# Patient Record
Sex: MALE | Race: WHITE | Employment: OTHER | URBAN - NONMETROPOLITAN AREA
[De-identification: names, ages, dates, MRNs, and addresses within clinical notes are randomized per-mention and may not be internally consistent; named-entity substitution may affect disease eponyms.]

---

## 2019-04-12 LAB
CHOLESTEROL, TOTAL: 171 MG/DL
CHOLESTEROL/HDL RATIO: 4
CREATININE: 1.1 MG/DL
HDLC SERPL-MCNC: 43 MG/DL (ref 35–70)
LDL CHOLESTEROL CALCULATED: 110 MG/DL (ref 0–160)
POTASSIUM (K+): 4.4
TRIGL SERPL-MCNC: 90 MG/DL
VLDLC SERPL CALC-MCNC: NORMAL MG/DL

## 2019-07-03 ENCOUNTER — OFFICE VISIT (OUTPATIENT)
Dept: FAMILY MEDICINE CLINIC | Age: 69
End: 2019-07-03
Payer: MEDICARE

## 2019-07-03 VITALS
DIASTOLIC BLOOD PRESSURE: 92 MMHG | SYSTOLIC BLOOD PRESSURE: 146 MMHG | TEMPERATURE: 98.5 F | HEART RATE: 68 BPM | WEIGHT: 216 LBS
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
## 2019-07-03 PROCEDURE — 99213 OFFICE O/P EST LOW 20 MIN: CPT | Performed by: FAMILY MEDICINE

## 2019-07-03 RX ORDER — DOXYCYCLINE HYCLATE 100 MG
100 TABLET ORAL 2 TIMES DAILY
Qty: 20 TABLET | Refills: 0 | Status: SHIPPED | OUTPATIENT
Start: 2019-07-03 | End: 2019-07-13

## 2019-07-03 RX ORDER — OMEPRAZOLE 20 MG/1
CAPSULE, DELAYED RELEASE ORAL
COMMUNITY
Start: 2019-06-20 | End: 2019-10-11 | Stop reason: SDUPTHER

## 2019-07-03 RX ORDER — LISINOPRIL AND HYDROCHLOROTHIAZIDE 20; 12.5 MG/1; MG/1
TABLET ORAL
Refills: 0 | COMMUNITY
Start: 2019-04-12 | End: 2019-10-01 | Stop reason: SDUPTHER

## 2019-10-01 RX ORDER — LISINOPRIL AND HYDROCHLOROTHIAZIDE 20; 12.5 MG/1; MG/1
TABLET ORAL
Qty: 30 TABLET | Refills: 0 | Status: SHIPPED | OUTPATIENT
Start: 2019-10-01 | End: 2019-10-11 | Stop reason: SDUPTHER

## 2019-10-04 RX ORDER — ASPIRIN 81 MG/1
81 TABLET ORAL DAILY
COMMUNITY

## 2019-10-04 RX ORDER — M-VIT,TX,IRON,MINS/CALC/FOLIC 27MG-0.4MG
1 TABLET ORAL DAILY
COMMUNITY

## 2019-10-04 RX ORDER — MULTIVITAMIN WITH IRON
TABLET ORAL
COMMUNITY
End: 2019-11-15 | Stop reason: ALTCHOICE

## 2019-10-11 ENCOUNTER — HOSPITAL ENCOUNTER (OUTPATIENT)
Age: 69
Discharge: HOME | End: 2019-10-13
Payer: MEDICARE

## 2019-10-11 ENCOUNTER — OFFICE VISIT (OUTPATIENT)
Dept: FAMILY MEDICINE CLINIC | Age: 69
End: 2019-10-11
Payer: MEDICARE

## 2019-10-11 VITALS
TEMPERATURE: 98.1 F | BODY MASS INDEX: 29.96 KG/M2 | OXYGEN SATURATION: 98 % | DIASTOLIC BLOOD PRESSURE: 100 MMHG | HEIGHT: 71 IN | WEIGHT: 214 LBS | SYSTOLIC BLOOD PRESSURE: 152 MMHG | HEART RATE: 76 BPM

## 2019-10-11 DIAGNOSIS — E55.9 VITAMIN D DEFICIENCY: ICD-10-CM

## 2019-10-11 DIAGNOSIS — M25.552 LEFT HIP PAIN: ICD-10-CM

## 2019-10-11 DIAGNOSIS — E78.5 HYPERLIPIDEMIA, UNSPECIFIED HYPERLIPIDEMIA TYPE: ICD-10-CM

## 2019-10-11 DIAGNOSIS — R73.01 IMPAIRED FASTING GLUCOSE: ICD-10-CM

## 2019-10-11 DIAGNOSIS — I10 ESSENTIAL HYPERTENSION: ICD-10-CM

## 2019-10-11 DIAGNOSIS — E03.9 HYPOTHYROIDISM, UNSPECIFIED TYPE: ICD-10-CM

## 2019-10-11 DIAGNOSIS — M54.50 LUMBAR PAIN: Primary | ICD-10-CM

## 2019-10-11 LAB
ALBUMIN SERPL-MCNC: 4.6 G/DL (ref 3.5–5.2)
ALP BLD-CCNC: 60 U/L (ref 40–129)
ALT SERPL-CCNC: 39 U/L (ref 0–40)
ANION GAP SERPL CALCULATED.3IONS-SCNC: 14 MMOL/L (ref 7–16)
AST SERPL-CCNC: 33 U/L (ref 0–39)
BASOPHILS ABSOLUTE: 0.03 E9/L (ref 0–0.2)
BASOPHILS RELATIVE PERCENT: 0.6 % (ref 0–2)
BILIRUB SERPL-MCNC: 1.1 MG/DL (ref 0–1.2)
BUN BLDV-MCNC: 15 MG/DL (ref 8–23)
CALCIUM SERPL-MCNC: 9.9 MG/DL (ref 8.6–10.2)
CHLORIDE BLD-SCNC: 103 MMOL/L (ref 98–107)
CHOLESTEROL, TOTAL: 174 MG/DL (ref 0–199)
CO2: 24 MMOL/L (ref 22–29)
CREAT SERPL-MCNC: 1.1 MG/DL (ref 0.7–1.2)
CREATININE URINE: 78 MG/DL (ref 40–278)
EOSINOPHILS ABSOLUTE: 0.13 E9/L (ref 0.05–0.5)
EOSINOPHILS RELATIVE PERCENT: 2.4 % (ref 0–6)
GFR AFRICAN AMERICAN: >60
GFR NON-AFRICAN AMERICAN: >60 ML/MIN/1.73
GLUCOSE BLD-MCNC: 112 MG/DL (ref 74–99)
HBA1C MFR BLD: 5.3 % (ref 4–5.6)
HCT VFR BLD CALC: 49.5 % (ref 37–54)
HDLC SERPL-MCNC: 39 MG/DL
HEMOGLOBIN: 16.4 G/DL (ref 12.5–16.5)
IMMATURE GRANULOCYTES #: 0.02 E9/L
IMMATURE GRANULOCYTES %: 0.4 % (ref 0–5)
LDL CHOLESTEROL CALCULATED: 100 MG/DL (ref 0–99)
LYMPHOCYTES ABSOLUTE: 0.79 E9/L (ref 1.5–4)
LYMPHOCYTES RELATIVE PERCENT: 14.8 % (ref 20–42)
MCH RBC QN AUTO: 32.4 PG (ref 26–35)
MCHC RBC AUTO-ENTMCNC: 33.1 % (ref 32–34.5)
MCV RBC AUTO: 97.8 FL (ref 80–99.9)
MICROALBUMIN UR-MCNC: <12 MG/L
MICROALBUMIN/CREAT UR-RTO: ABNORMAL (ref 0–30)
MONOCYTES ABSOLUTE: 0.46 E9/L (ref 0.1–0.95)
MONOCYTES RELATIVE PERCENT: 8.6 % (ref 2–12)
NEUTROPHILS ABSOLUTE: 3.89 E9/L (ref 1.8–7.3)
NEUTROPHILS RELATIVE PERCENT: 73.2 % (ref 43–80)
PDW BLD-RTO: 12.6 FL (ref 11.5–15)
PLATELET # BLD: 165 E9/L (ref 130–450)
PMV BLD AUTO: 10.6 FL (ref 7–12)
POTASSIUM SERPL-SCNC: 4.2 MMOL/L (ref 3.5–5)
RBC # BLD: 5.06 E12/L (ref 3.8–5.8)
SODIUM BLD-SCNC: 141 MMOL/L (ref 132–146)
T4 FREE: 1.29 NG/DL (ref 0.93–1.7)
TOTAL CK: 121 U/L (ref 20–200)
TOTAL PROTEIN: 7.6 G/DL (ref 6.4–8.3)
TRIGL SERPL-MCNC: 174 MG/DL (ref 0–149)
TSH SERPL DL<=0.05 MIU/L-ACNC: 0.47 UIU/ML (ref 0.27–4.2)
URIC ACID, SERUM: 8 MG/DL (ref 3.4–7)
VITAMIN D 25-HYDROXY: 40 NG/ML (ref 30–100)
VLDLC SERPL CALC-MCNC: 35 MG/DL
WBC # BLD: 5.3 E9/L (ref 4.5–11.5)

## 2019-10-11 PROCEDURE — 80053 COMPREHEN METABOLIC PANEL: CPT

## 2019-10-11 PROCEDURE — 85025 COMPLETE CBC W/AUTO DIFF WBC: CPT

## 2019-10-11 PROCEDURE — 82306 VITAMIN D 25 HYDROXY: CPT

## 2019-10-11 PROCEDURE — 83036 HEMOGLOBIN GLYCOSYLATED A1C: CPT

## 2019-10-11 PROCEDURE — 82044 UR ALBUMIN SEMIQUANTITATIVE: CPT

## 2019-10-11 PROCEDURE — 80061 LIPID PANEL: CPT

## 2019-10-11 PROCEDURE — 36415 COLL VENOUS BLD VENIPUNCTURE: CPT

## 2019-10-11 PROCEDURE — G0008 ADMIN INFLUENZA VIRUS VAC: HCPCS | Performed by: FAMILY MEDICINE

## 2019-10-11 PROCEDURE — 84439 ASSAY OF FREE THYROXINE: CPT

## 2019-10-11 PROCEDURE — 82570 ASSAY OF URINE CREATININE: CPT

## 2019-10-11 PROCEDURE — 84550 ASSAY OF BLOOD/URIC ACID: CPT

## 2019-10-11 PROCEDURE — 84443 ASSAY THYROID STIM HORMONE: CPT

## 2019-10-11 PROCEDURE — 82550 ASSAY OF CK (CPK): CPT

## 2019-10-11 PROCEDURE — 90653 IIV ADJUVANT VACCINE IM: CPT | Performed by: FAMILY MEDICINE

## 2019-10-11 PROCEDURE — 99215 OFFICE O/P EST HI 40 MIN: CPT | Performed by: FAMILY MEDICINE

## 2019-10-11 RX ORDER — LISINOPRIL AND HYDROCHLOROTHIAZIDE 20; 12.5 MG/1; MG/1
TABLET ORAL
Qty: 30 TABLET | Refills: 5 | Status: SHIPPED | OUTPATIENT
Start: 2019-10-11 | End: 2019-11-04 | Stop reason: SDUPTHER

## 2019-10-11 RX ORDER — CITALOPRAM 10 MG/1
10 TABLET ORAL DAILY
Qty: 30 TABLET | Refills: 5 | Status: SHIPPED | OUTPATIENT
Start: 2019-10-11 | End: 2019-10-25 | Stop reason: SINTOL

## 2019-10-11 RX ORDER — OMEPRAZOLE 20 MG/1
20 CAPSULE, DELAYED RELEASE ORAL DAILY
Qty: 30 CAPSULE | Refills: 5 | Status: SHIPPED
Start: 2019-10-11 | End: 2020-05-22 | Stop reason: SDUPTHER

## 2019-10-11 RX ORDER — AMLODIPINE BESYLATE 5 MG/1
5 TABLET ORAL DAILY
Qty: 30 TABLET | Refills: 3 | Status: SHIPPED
Start: 2019-10-11 | End: 2020-02-12 | Stop reason: SDUPTHER

## 2019-10-11 ASSESSMENT — PATIENT HEALTH QUESTIONNAIRE - PHQ9
1. LITTLE INTEREST OR PLEASURE IN DOING THINGS: 0
SUM OF ALL RESPONSES TO PHQ9 QUESTIONS 1 & 2: 0
SUM OF ALL RESPONSES TO PHQ QUESTIONS 1-9: 0
SUM OF ALL RESPONSES TO PHQ QUESTIONS 1-9: 0
2. FEELING DOWN, DEPRESSED OR HOPELESS: 0

## 2019-10-11 ASSESSMENT — ENCOUNTER SYMPTOMS
ALLERGIC/IMMUNOLOGIC NEGATIVE: 1
COUGH: 0
ABDOMINAL PAIN: 0
WHEEZING: 0
SORE THROAT: 0
CHEST TIGHTNESS: 0
EYE PAIN: 0
TROUBLE SWALLOWING: 0
CONSTIPATION: 0
EYE DISCHARGE: 0
VOMITING: 0
SINUS PAIN: 0
SHORTNESS OF BREATH: 0
BACK PAIN: 1
COLOR CHANGE: 0
DIARRHEA: 0
NAUSEA: 0

## 2019-10-25 ENCOUNTER — OFFICE VISIT (OUTPATIENT)
Dept: FAMILY MEDICINE CLINIC | Age: 69
End: 2019-10-25
Payer: MEDICARE

## 2019-10-25 VITALS
OXYGEN SATURATION: 98 % | SYSTOLIC BLOOD PRESSURE: 148 MMHG | HEIGHT: 71 IN | WEIGHT: 214 LBS | DIASTOLIC BLOOD PRESSURE: 94 MMHG | BODY MASS INDEX: 29.96 KG/M2 | TEMPERATURE: 97.7 F | HEART RATE: 82 BPM

## 2019-10-25 DIAGNOSIS — I10 ESSENTIAL HYPERTENSION: Primary | ICD-10-CM

## 2019-10-25 DIAGNOSIS — N52.8 OTHER MALE ERECTILE DYSFUNCTION: ICD-10-CM

## 2019-10-25 DIAGNOSIS — F34.1 DYSTHYMIA: ICD-10-CM

## 2019-10-25 PROCEDURE — 99213 OFFICE O/P EST LOW 20 MIN: CPT | Performed by: FAMILY MEDICINE

## 2019-10-25 RX ORDER — FLUOXETINE 10 MG/1
10 CAPSULE ORAL DAILY
Qty: 30 CAPSULE | Refills: 3 | Status: SHIPPED
Start: 2019-10-25 | End: 2020-05-22

## 2019-10-25 ASSESSMENT — ENCOUNTER SYMPTOMS
ABDOMINAL PAIN: 0
NAUSEA: 0
EYE PAIN: 0
EYE DISCHARGE: 0
ALLERGIC/IMMUNOLOGIC NEGATIVE: 1
COUGH: 0
VOMITING: 0
SINUS PAIN: 0
DIARRHEA: 0
SHORTNESS OF BREATH: 0
WHEEZING: 0
BACK PAIN: 1
SORE THROAT: 0
COLOR CHANGE: 0
CHEST TIGHTNESS: 0
TROUBLE SWALLOWING: 0
CONSTIPATION: 0

## 2019-11-04 RX ORDER — LISINOPRIL AND HYDROCHLOROTHIAZIDE 20; 12.5 MG/1; MG/1
TABLET ORAL
Qty: 30 TABLET | Refills: 0 | Status: SHIPPED | OUTPATIENT
Start: 2019-11-04 | End: 2019-12-03 | Stop reason: SDUPTHER

## 2019-11-15 ENCOUNTER — OFFICE VISIT (OUTPATIENT)
Dept: FAMILY MEDICINE CLINIC | Age: 69
End: 2019-11-15
Payer: MEDICARE

## 2019-11-15 VITALS
OXYGEN SATURATION: 97 % | DIASTOLIC BLOOD PRESSURE: 82 MMHG | BODY MASS INDEX: 29.85 KG/M2 | HEART RATE: 84 BPM | TEMPERATURE: 97.8 F | WEIGHT: 214 LBS | SYSTOLIC BLOOD PRESSURE: 138 MMHG

## 2019-11-15 DIAGNOSIS — F34.1 DYSTHYMIA: ICD-10-CM

## 2019-11-15 DIAGNOSIS — I10 ESSENTIAL HYPERTENSION: Primary | ICD-10-CM

## 2019-11-15 PROCEDURE — 99213 OFFICE O/P EST LOW 20 MIN: CPT | Performed by: FAMILY MEDICINE

## 2019-11-15 ASSESSMENT — ENCOUNTER SYMPTOMS
EYE PAIN: 0
TROUBLE SWALLOWING: 0
COLOR CHANGE: 0
CONSTIPATION: 0
WHEEZING: 0
SINUS PAIN: 0
COUGH: 0
SHORTNESS OF BREATH: 0
ABDOMINAL PAIN: 0
NAUSEA: 0
SORE THROAT: 0
CHEST TIGHTNESS: 0
DIARRHEA: 0
BACK PAIN: 1
EYE DISCHARGE: 0
ALLERGIC/IMMUNOLOGIC NEGATIVE: 1
VOMITING: 0

## 2019-12-03 RX ORDER — LISINOPRIL AND HYDROCHLOROTHIAZIDE 20; 12.5 MG/1; MG/1
TABLET ORAL
Qty: 90 TABLET | Refills: 2 | Status: SHIPPED
Start: 2019-12-03 | End: 2020-05-22 | Stop reason: SDUPTHER

## 2020-02-12 RX ORDER — AMLODIPINE BESYLATE 5 MG/1
5 TABLET ORAL DAILY
Qty: 30 TABLET | Refills: 3 | Status: SHIPPED
Start: 2020-02-12 | End: 2020-05-22 | Stop reason: SDUPTHER

## 2020-02-12 NOTE — TELEPHONE ENCOUNTER
Last Appointment:  11/15/2019  No future appointments. Patient requesting refill on pended medication stated he only has 2 pills left.

## 2020-05-22 ENCOUNTER — VIRTUAL VISIT (OUTPATIENT)
Dept: PRIMARY CARE CLINIC | Age: 70
End: 2020-05-22
Payer: MEDICARE

## 2020-05-22 PROCEDURE — 81003 URINALYSIS AUTO W/O SCOPE: CPT | Performed by: FAMILY MEDICINE

## 2020-05-22 PROCEDURE — 99214 OFFICE O/P EST MOD 30 MIN: CPT | Performed by: FAMILY MEDICINE

## 2020-05-22 RX ORDER — LISINOPRIL AND HYDROCHLOROTHIAZIDE 20; 12.5 MG/1; MG/1
TABLET ORAL
Qty: 90 TABLET | Refills: 3 | Status: SHIPPED
Start: 2020-05-22 | End: 2021-05-17 | Stop reason: SDUPTHER

## 2020-05-22 RX ORDER — AMLODIPINE BESYLATE 5 MG/1
5 TABLET ORAL DAILY
Qty: 90 TABLET | Refills: 3 | Status: SHIPPED
Start: 2020-05-22 | End: 2020-12-07

## 2020-05-22 RX ORDER — OMEPRAZOLE 20 MG/1
20 CAPSULE, DELAYED RELEASE ORAL DAILY
Qty: 90 CAPSULE | Refills: 3 | Status: SHIPPED
Start: 2020-05-22 | End: 2020-12-07

## 2020-05-22 ASSESSMENT — PATIENT HEALTH QUESTIONNAIRE - PHQ9
2. FEELING DOWN, DEPRESSED OR HOPELESS: 0
SUM OF ALL RESPONSES TO PHQ9 QUESTIONS 1 & 2: 0
1. LITTLE INTEREST OR PLEASURE IN DOING THINGS: 0
SUM OF ALL RESPONSES TO PHQ QUESTIONS 1-9: 0
SUM OF ALL RESPONSES TO PHQ QUESTIONS 1-9: 0

## 2020-05-22 ASSESSMENT — ENCOUNTER SYMPTOMS
CONSTIPATION: 0
DIARRHEA: 0
BACK PAIN: 0
ABDOMINAL PAIN: 0
NAUSEA: 0
VOMITING: 0
SHORTNESS OF BREATH: 0
COUGH: 0
WHEEZING: 0

## 2020-05-22 NOTE — PROGRESS NOTES
level: Not on file   Occupational History    Not on file   Social Needs    Financial resource strain: Not on file    Food insecurity     Worry: Not on file     Inability: Not on file    Transportation needs     Medical: Not on file     Non-medical: Not on file   Tobacco Use    Smoking status: Never Smoker    Smokeless tobacco: Never Used   Substance and Sexual Activity    Alcohol use: Not on file    Drug use: Not on file    Sexual activity: Not on file   Lifestyle    Physical activity     Days per week: Not on file     Minutes per session: Not on file    Stress: Not on file   Relationships    Social connections     Talks on phone: Not on file     Gets together: Not on file     Attends Tenriism service: Not on file     Active member of club or organization: Not on file     Attends meetings of clubs or organizations: Not on file     Relationship status: Not on file    Intimate partner violence     Fear of current or ex partner: Not on file     Emotionally abused: Not on file     Physically abused: Not on file     Forced sexual activity: Not on file   Other Topics Concern    Not on file   Social History Narrative    Not on file       There were no vitals filed for this visit. Physical Exam:  Physical Exam  Vitals signs and nursing note reviewed. Constitutional:       General: He is not in acute distress. Appearance: Normal appearance. He is well-developed and normal weight. He is not ill-appearing. HENT:      Head: Normocephalic and atraumatic. Right Ear: Hearing and external ear normal.      Left Ear: Hearing and external ear normal.      Nose: Nose normal.      Mouth/Throat:      Mouth: Mucous membranes are moist.   Eyes:      General: Lids are normal. No scleral icterus. Extraocular Movements: Extraocular movements intact. Conjunctiva/sclera: Conjunctivae normal.   Neck:      Musculoskeletal: Normal range of motion.    Pulmonary:      Effort: Pulmonary effort is normal. No MG per tablet; take 1 tablet by mouth every morning  -     CBC Auto Differential; Future  -     Comprehensive Metabolic Panel; Future  -     TSH without Reflex; Future  -     Urinalysis; Future  Patient has been checking pressures at home. Well controlled. Due for refills and labs at this time. Mixed hyperlipidemia  -     Lipid Panel; Future    GERD without esophagitis  -     omeprazole (PRILOSEC) 20 MG delayed release capsule; Take 1 capsule by mouth Daily    Vitamin D insufficiency  -     Vitamin D 25 Hydroxy; Future    Impaired fasting blood sugar  -     Hemoglobin A1C; Future    Screening for prostate cancer  -     Psa screening; Future        Return in about 6 months (around 11/22/2020) for AWV.       Seen By:  Diane Kerns DO

## 2020-06-01 ENCOUNTER — HOSPITAL ENCOUNTER (OUTPATIENT)
Age: 70
Discharge: HOME | End: 2020-06-03
Payer: MEDICARE

## 2020-06-01 LAB
ALBUMIN SERPL-MCNC: 4.7 G/DL (ref 3.5–5.2)
ALP BLD-CCNC: 62 U/L (ref 40–129)
ALT SERPL-CCNC: 42 U/L (ref 0–40)
ANION GAP SERPL CALCULATED.3IONS-SCNC: 14 MMOL/L (ref 7–16)
AST SERPL-CCNC: 31 U/L (ref 0–39)
BASOPHILS ABSOLUTE: 0.05 E9/L (ref 0–0.2)
BASOPHILS RELATIVE PERCENT: 0.9 % (ref 0–2)
BILIRUB SERPL-MCNC: 0.8 MG/DL (ref 0–1.2)
BILIRUBIN URINE: NEGATIVE
BLOOD, URINE: NEGATIVE
BUN BLDV-MCNC: 14 MG/DL (ref 8–23)
CALCIUM SERPL-MCNC: 10.1 MG/DL (ref 8.6–10.2)
CHLORIDE BLD-SCNC: 101 MMOL/L (ref 98–107)
CHOLESTEROL, TOTAL: 182 MG/DL (ref 0–199)
CLARITY: CLEAR
CO2: 23 MMOL/L (ref 22–29)
COLOR: YELLOW
CREAT SERPL-MCNC: 1.1 MG/DL (ref 0.7–1.2)
EOSINOPHILS ABSOLUTE: 0.19 E9/L (ref 0.05–0.5)
EOSINOPHILS RELATIVE PERCENT: 3.4 % (ref 0–6)
GFR AFRICAN AMERICAN: >60
GFR NON-AFRICAN AMERICAN: >60 ML/MIN/1.73
GLUCOSE BLD-MCNC: 126 MG/DL (ref 74–99)
GLUCOSE URINE: NEGATIVE MG/DL
HBA1C MFR BLD: 5.6 % (ref 4–5.6)
HCT VFR BLD CALC: 47.5 % (ref 37–54)
HDLC SERPL-MCNC: 37 MG/DL
HEMOGLOBIN: 16.5 G/DL (ref 12.5–16.5)
IMMATURE GRANULOCYTES #: 0.01 E9/L
IMMATURE GRANULOCYTES %: 0.2 % (ref 0–5)
KETONES, URINE: NEGATIVE MG/DL
LDL CHOLESTEROL CALCULATED: 115 MG/DL (ref 0–99)
LEUKOCYTE ESTERASE, URINE: NEGATIVE
LYMPHOCYTES ABSOLUTE: 0.87 E9/L (ref 1.5–4)
LYMPHOCYTES RELATIVE PERCENT: 15.8 % (ref 20–42)
MCH RBC QN AUTO: 32.9 PG (ref 26–35)
MCHC RBC AUTO-ENTMCNC: 34.7 % (ref 32–34.5)
MCV RBC AUTO: 94.6 FL (ref 80–99.9)
MONOCYTES ABSOLUTE: 0.45 E9/L (ref 0.1–0.95)
MONOCYTES RELATIVE PERCENT: 8.2 % (ref 2–12)
NEUTROPHILS ABSOLUTE: 3.94 E9/L (ref 1.8–7.3)
NEUTROPHILS RELATIVE PERCENT: 71.5 % (ref 43–80)
NITRITE, URINE: NEGATIVE
PDW BLD-RTO: 12.4 FL (ref 11.5–15)
PH UA: 7 (ref 5–9)
PLATELET # BLD: 180 E9/L (ref 130–450)
PMV BLD AUTO: 10.4 FL (ref 7–12)
POTASSIUM SERPL-SCNC: 4.6 MMOL/L (ref 3.5–5)
PROSTATE SPECIFIC ANTIGEN: 0.78 NG/ML (ref 0–4)
PROTEIN UA: NEGATIVE MG/DL
RBC # BLD: 5.02 E12/L (ref 3.8–5.8)
SODIUM BLD-SCNC: 138 MMOL/L (ref 132–146)
SPECIFIC GRAVITY UA: 1.01 (ref 1–1.03)
TOTAL PROTEIN: 7.5 G/DL (ref 6.4–8.3)
TRIGL SERPL-MCNC: 151 MG/DL (ref 0–149)
TSH SERPL DL<=0.05 MIU/L-ACNC: 0.67 UIU/ML (ref 0.27–4.2)
UROBILINOGEN, URINE: 0.2 E.U./DL
VITAMIN D 25-HYDROXY: 31 NG/ML (ref 30–100)
VLDLC SERPL CALC-MCNC: 30 MG/DL
WBC # BLD: 5.5 E9/L (ref 4.5–11.5)

## 2020-06-01 PROCEDURE — 83036 HEMOGLOBIN GLYCOSYLATED A1C: CPT

## 2020-06-01 PROCEDURE — 85025 COMPLETE CBC W/AUTO DIFF WBC: CPT

## 2020-06-01 PROCEDURE — 36415 COLL VENOUS BLD VENIPUNCTURE: CPT

## 2020-06-01 PROCEDURE — 80061 LIPID PANEL: CPT

## 2020-06-01 PROCEDURE — G0103 PSA SCREENING: HCPCS

## 2020-06-01 PROCEDURE — 80053 COMPREHEN METABOLIC PANEL: CPT

## 2020-06-01 PROCEDURE — 84443 ASSAY THYROID STIM HORMONE: CPT

## 2020-06-01 PROCEDURE — 81003 URINALYSIS AUTO W/O SCOPE: CPT

## 2020-06-01 PROCEDURE — 82306 VITAMIN D 25 HYDROXY: CPT

## 2020-09-30 ENCOUNTER — TELEPHONE (OUTPATIENT)
Dept: ADMINISTRATIVE | Age: 70
End: 2020-09-30

## 2020-09-30 NOTE — TELEPHONE ENCOUNTER
Patient called he played golf with someone on Sunday that called him today and has  tested positive for covid. He needs advise as to what he should do. Please call him.

## 2020-09-30 NOTE — TELEPHONE ENCOUNTER
Advised pt to monitor for covid symptoms and went over symptoms with him.  He voiced understanding and will schedule Into flu clinic if needed

## 2020-10-08 ENCOUNTER — NURSE ONLY (OUTPATIENT)
Dept: FAMILY MEDICINE CLINIC | Age: 70
End: 2020-10-08
Payer: MEDICARE

## 2020-10-08 PROCEDURE — G0008 ADMIN INFLUENZA VIRUS VAC: HCPCS | Performed by: FAMILY MEDICINE

## 2020-10-08 PROCEDURE — 90694 VACC AIIV4 NO PRSRV 0.5ML IM: CPT | Performed by: FAMILY MEDICINE

## 2020-10-20 ENCOUNTER — OFFICE VISIT (OUTPATIENT)
Dept: CHIROPRACTIC MEDICINE | Age: 70
End: 2020-10-20
Payer: MEDICARE

## 2020-10-20 VITALS
DIASTOLIC BLOOD PRESSURE: 70 MMHG | BODY MASS INDEX: 29.54 KG/M2 | SYSTOLIC BLOOD PRESSURE: 132 MMHG | HEART RATE: 94 BPM | WEIGHT: 211 LBS | TEMPERATURE: 97.4 F | HEIGHT: 71 IN | OXYGEN SATURATION: 95 %

## 2020-10-20 PROCEDURE — 99202 OFFICE O/P NEW SF 15 MIN: CPT | Performed by: CHIROPRACTOR

## 2020-10-20 PROCEDURE — 98940 CHIROPRACT MANJ 1-2 REGIONS: CPT | Performed by: CHIROPRACTOR

## 2020-10-20 ASSESSMENT — ENCOUNTER SYMPTOMS
BACK PAIN: 1
BOWEL INCONTINENCE: 0

## 2020-10-20 NOTE — PROGRESS NOTES
[facility]    10/20/20  Mejia Decalina : 1950 Sex: male  Age: 79 y.o. Patient was referred by Brittaney Rehman DO    Chief Complaint   Patient presents with    New Patient     lower back pain        Euel Paget is a new patient to me today presenting for care of lower back pain, greater than 1 year duration. No specific injury. He previously treated with Dr. Alex Maldonado in [place]. He golfs twice per week and notices that when he bends to  ball he, he has pain when standing back up. It also is worse with certain activities such as painting. He states normally as long as he is avoiding those activities he is fine. When it bugs him, he will take some ibuprofen which seems to help. He denies any pain radiating to the extremities. No pain with coughing sneezing or laughing. No loss of bowel or bladder habits. Prior chiro care - > 5 years ago  He did have some x-rays performed by Dr. Marco Sprague only. Back Pain   This is a chronic problem. The current episode started more than 1 year ago. The problem occurs intermittently. The pain is present in the lumbar spine and sacro-iliac. The quality of the pain is described as shooting. Pertinent negatives include no bladder incontinence, bowel incontinence, fever, leg pain, paresthesias, tingling or weakness. He has tried NSAIDs for the symptoms. Red Flags:  none    Review of Systems   Constitutional: Negative for chills and fever. Gastrointestinal: Negative for bowel incontinence. Genitourinary: Negative for bladder incontinence. Musculoskeletal: Positive for back pain. Neurological: Negative for tingling, weakness and paresthesias.          Current Outpatient Medications:     amLODIPine (NORVASC) 5 MG tablet, Take 1 tablet by mouth daily, Disp: 90 tablet, Rfl: 3    lisinopril-hydroCHLOROthiazide (PRINZIDE;ZESTORETIC) 20-12.5 MG per tablet, take 1 tablet by mouth every morning, Disp: 90 tablet, Rfl: 3    omeprazole (PRILOSEC) 20 MG delayed release capsule, Take 1 capsule by mouth Daily, Disp: 90 capsule, Rfl: 3    Multiple Vitamins-Minerals (THERAPEUTIC MULTIVITAMIN-MINERALS) tablet, Take 1 tablet by mouth daily, Disp: , Rfl:     aspirin 81 MG EC tablet, Take 81 mg by mouth daily, Disp: , Rfl:     Allergies   Allergen Reactions    Penicillins        Past Medical History:   Diagnosis Date    Colon polyps     DJD (degenerative joint disease)     GERD (gastroesophageal reflux disease)     peptic reflux disease    Headache     Hyperlipidemia     Hypertension     Osteoarthritis      No family history on file. No past surgical history on file.   Social History     Socioeconomic History    Marital status: Unknown     Spouse name: Not on file    Number of children: Not on file    Years of education: Not on file    Highest education level: Not on file   Occupational History    Not on file   Social Needs    Financial resource strain: Not on file    Food insecurity     Worry: Not on file     Inability: Not on file    Transportation needs     Medical: Not on file     Non-medical: Not on file   Tobacco Use    Smoking status: Never Smoker    Smokeless tobacco: Never Used   Substance and Sexual Activity    Alcohol use: Not on file    Drug use: Not on file    Sexual activity: Not on file   Lifestyle    Physical activity     Days per week: Not on file     Minutes per session: Not on file    Stress: Not on file   Relationships    Social connections     Talks on phone: Not on file     Gets together: Not on file     Attends Baptism service: Not on file     Active member of club or organization: Not on file     Attends meetings of clubs or organizations: Not on file     Relationship status: Not on file    Intimate partner violence     Fear of current or ex partner: Not on file     Emotionally abused: Not on file     Physically abused: Not on file     Forced sexual activity: Not on file   Other Topics Concern    Not on file   Social History Narrative    Not on file       Vitals:    10/20/20 1029   BP: 132/70   Site: Left Upper Arm   Position: Sitting   Cuff Size: Medium Adult   Pulse: 94   Temp: 97.4 °F (36.3 °C)   SpO2: 95%   Weight: 211 lb (95.7 kg)   Height: 5' 11\" (1.803 m)       EXAM:  Appears well. No acute distress. Oriented x3. Ambulates without difficulty. No antalgia or list.  Able to squat to 75 % of normal.      Lumbar flexion: 50/60  Lumbar extension: 15/25  Right lateral flexion: 15/25  Left lateral flexion: 15/25    Reflexes are +2/5 and symmetrical at the Patella and Achilles. Manual muscle testing reveals: 5/5 strength to the LE indicator muscles. Sensation to light touch is WNL to the distal lower extremity dermatomes. Posterior tibial pulses 2/4 B/L. Valsalva's: Negative   Seated SLR's: Negative Bilateral  Beebe's:  Negative Bilateral  Slump:  Negative Bilateral  Prone Lying: Unremarkable  Prone Lying in extension: Unremarkable  Repeated Extensions in Lying x10:  Decreased during loading/ better after loading    SI Compression: Negative bilateral  SI Distraction: Negative bilateral    Luz testing is negative bilateral.  No sciatic notch tenderness. Midline pain:Positive mild discomfort at the L3-4 interspace  Taut and Tender fibers lumbar paraspinals B/L  Joint fixation at: L3-5    Chetan was seen today for new patient. Diagnoses and all orders for this visit:    Segmental and somatic dysfunction of lumbar region    Spondylosis of lumbar region without myelopathy or radiculopathy        Treatment Plan:   I spoke with him regarding my exam findings and treatment options. I did review the x-rays taken 10/11/2019 showing multilevel degenerative changes. Rationale for obtaining new imaging today not noted on exam.  He is neurologically intact. No trauma. I recommended that we start a trial of conservative care today consisting epilation, HEP.   I will plan on seeing him 2x a month, then reassess to determine response to care and future options. With consent, I did begin today. Problem/Goals  Decrease pain and/or swelling and Increase ROM and/or flexibility    Today's Treatment  Going to modify his biomechanics, instead of forward bending at the waist to  his golf ball, will going to use a hinge with kickstand which were reviewed today. He has a good understanding. I also want him to begin Yfn repeated extensions in lying x5-10 at least once daily. Stop should symptoms worsen. Also, I did perform diversified manipulation to the listed lumbar segments today. This was tolerated well. I spoke to him regarding posttreatment soreness. Should any arise, he  may use ice for 10-15 minutes, over-the-counter NSAIDs or topical gels. Seen By:  Gogo Ovalle DC    * This note was created using voice recognition software.   The note was reviewed, however grammatical errors may exist.

## 2020-11-16 ENCOUNTER — TELEPHONE (OUTPATIENT)
Dept: PRIMARY CARE CLINIC | Age: 70
End: 2020-11-16

## 2020-11-17 ENCOUNTER — OFFICE VISIT (OUTPATIENT)
Dept: CHIROPRACTIC MEDICINE | Age: 70
End: 2020-11-17
Payer: MEDICARE

## 2020-11-17 VITALS — HEART RATE: 87 BPM | RESPIRATION RATE: 14 BRPM | TEMPERATURE: 97.7 F | OXYGEN SATURATION: 96 %

## 2020-11-17 PROCEDURE — 98940 CHIROPRACT MANJ 1-2 REGIONS: CPT | Performed by: CHIROPRACTOR

## 2020-11-17 PROCEDURE — 99999 PR OFFICE/OUTPT VISIT,PROCEDURE ONLY: CPT | Performed by: CHIROPRACTOR

## 2020-11-17 NOTE — PROGRESS NOTES
20  Ed Do : 1950 Sex: male  Age: 79 y.o. Chief Complaint   Patient presents with    Lower Back Pain     He states he has been doing his exercises and stretches; they have helped improve his pain. HPI:   Overall he is much improved. He has no pain today. He states he did his exercises and watched his bending/picking up the golf ball and felt much better soon after his last visit. He is not had any issues of late. Has some mild tightness only in his lower back at times. Very happy overall. Current Outpatient Medications:     amLODIPine (NORVASC) 5 MG tablet, Take 1 tablet by mouth daily, Disp: 90 tablet, Rfl: 3    lisinopril-hydroCHLOROthiazide (PRINZIDE;ZESTORETIC) 20-12.5 MG per tablet, take 1 tablet by mouth every morning, Disp: 90 tablet, Rfl: 3    omeprazole (PRILOSEC) 20 MG delayed release capsule, Take 1 capsule by mouth Daily, Disp: 90 capsule, Rfl: 3    Multiple Vitamins-Minerals (THERAPEUTIC MULTIVITAMIN-MINERALS) tablet, Take 1 tablet by mouth daily, Disp: , Rfl:     aspirin 81 MG EC tablet, Take 81 mg by mouth daily, Disp: , Rfl:     Exam:   Vitals:    20 0934   Pulse: 87   Resp: 14   Temp: 97.7 °F (36.5 °C)   SpO2: 96%         There are hypertonic and tender fibers noted today in the bilateral lumbar paraspinal muscles. Joint fixation is noted with motion screening at L3-5. Mild midline pain L3-4. Linda Beebe was seen today for lower back pain. Diagnoses and all orders for this visit:    Segmental and somatic dysfunction of lumbar region    Spondylosis of lumbar region without myelopathy or radiculopathy        Treatment Plan: Perform diversified manipulation to the affected segments today. Tolerated well. Continue with his HEP.   He can follow-up with me on an as-needed basis going forward      Seen By:  Kait Aranda

## 2020-11-18 DIAGNOSIS — E55.9 VITAMIN D INSUFFICIENCY: ICD-10-CM

## 2020-11-18 DIAGNOSIS — I10 BENIGN ESSENTIAL HYPERTENSION: ICD-10-CM

## 2020-11-18 DIAGNOSIS — R73.01 IMPAIRED FASTING BLOOD SUGAR: ICD-10-CM

## 2020-11-18 LAB
ALBUMIN SERPL-MCNC: 4.3 G/DL (ref 3.5–5.2)
ALP BLD-CCNC: 68 U/L (ref 40–129)
ALT SERPL-CCNC: 57 U/L (ref 0–40)
ANION GAP SERPL CALCULATED.3IONS-SCNC: 17 MMOL/L (ref 7–16)
AST SERPL-CCNC: 39 U/L (ref 0–39)
BASOPHILS ABSOLUTE: 0.06 E9/L (ref 0–0.2)
BASOPHILS RELATIVE PERCENT: 1 % (ref 0–2)
BILIRUB SERPL-MCNC: 0.6 MG/DL (ref 0–1.2)
BILIRUBIN URINE: NEGATIVE
BLOOD, URINE: NEGATIVE
BUN BLDV-MCNC: 14 MG/DL (ref 8–23)
CALCIUM SERPL-MCNC: 9.7 MG/DL (ref 8.6–10.2)
CHLORIDE BLD-SCNC: 101 MMOL/L (ref 98–107)
CHOLESTEROL, TOTAL: 165 MG/DL (ref 0–199)
CLARITY: CLEAR
CO2: 23 MMOL/L (ref 22–29)
COLOR: YELLOW
CREAT SERPL-MCNC: 1.1 MG/DL (ref 0.7–1.2)
EOSINOPHILS ABSOLUTE: 0.16 E9/L (ref 0.05–0.5)
EOSINOPHILS RELATIVE PERCENT: 2.7 % (ref 0–6)
GFR AFRICAN AMERICAN: >60
GFR NON-AFRICAN AMERICAN: >60 ML/MIN/1.73
GLUCOSE BLD-MCNC: 137 MG/DL (ref 74–99)
GLUCOSE URINE: NEGATIVE MG/DL
HBA1C MFR BLD: 5.7 % (ref 4–5.6)
HCT VFR BLD CALC: 49.3 % (ref 37–54)
HDLC SERPL-MCNC: 36 MG/DL
HEMOGLOBIN: 16.2 G/DL (ref 12.5–16.5)
IMMATURE GRANULOCYTES #: 0.01 E9/L
IMMATURE GRANULOCYTES %: 0.2 % (ref 0–5)
KETONES, URINE: NEGATIVE MG/DL
LDL CHOLESTEROL CALCULATED: 103 MG/DL (ref 0–99)
LEUKOCYTE ESTERASE, URINE: NEGATIVE
LYMPHOCYTES ABSOLUTE: 1.03 E9/L (ref 1.5–4)
LYMPHOCYTES RELATIVE PERCENT: 17.3 % (ref 20–42)
MCH RBC QN AUTO: 32.5 PG (ref 26–35)
MCHC RBC AUTO-ENTMCNC: 32.9 % (ref 32–34.5)
MCV RBC AUTO: 99 FL (ref 80–99.9)
MONOCYTES ABSOLUTE: 0.54 E9/L (ref 0.1–0.95)
MONOCYTES RELATIVE PERCENT: 9 % (ref 2–12)
NEUTROPHILS ABSOLUTE: 4.17 E9/L (ref 1.8–7.3)
NEUTROPHILS RELATIVE PERCENT: 69.8 % (ref 43–80)
NITRITE, URINE: NEGATIVE
PDW BLD-RTO: 12.8 FL (ref 11.5–15)
PH UA: 6 (ref 5–9)
PLATELET # BLD: 170 E9/L (ref 130–450)
PMV BLD AUTO: 10.5 FL (ref 7–12)
POTASSIUM SERPL-SCNC: 4.2 MMOL/L (ref 3.5–5)
PROTEIN UA: NEGATIVE MG/DL
RBC # BLD: 4.98 E12/L (ref 3.8–5.8)
SODIUM BLD-SCNC: 141 MMOL/L (ref 132–146)
SPECIFIC GRAVITY UA: 1.02 (ref 1–1.03)
TOTAL PROTEIN: 7.1 G/DL (ref 6.4–8.3)
TRIGL SERPL-MCNC: 129 MG/DL (ref 0–149)
TSH SERPL DL<=0.05 MIU/L-ACNC: 0.76 UIU/ML (ref 0.27–4.2)
UROBILINOGEN, URINE: 0.2 E.U./DL
VITAMIN D 25-HYDROXY: 35 NG/ML (ref 30–100)
VLDLC SERPL CALC-MCNC: 26 MG/DL
WBC # BLD: 6 E9/L (ref 4.5–11.5)

## 2020-12-02 ENCOUNTER — OFFICE VISIT (OUTPATIENT)
Dept: PRIMARY CARE CLINIC | Age: 70
End: 2020-12-02
Payer: MEDICARE

## 2020-12-02 VITALS
WEIGHT: 210 LBS | BODY MASS INDEX: 29.4 KG/M2 | OXYGEN SATURATION: 98 % | SYSTOLIC BLOOD PRESSURE: 124 MMHG | DIASTOLIC BLOOD PRESSURE: 80 MMHG | HEART RATE: 102 BPM | HEIGHT: 71 IN | TEMPERATURE: 97.8 F

## 2020-12-02 LAB
Lab: ABNORMAL
QC PASS/FAIL: ABNORMAL
SARS-COV-2, POC: DETECTED

## 2020-12-02 PROCEDURE — 99213 OFFICE O/P EST LOW 20 MIN: CPT | Performed by: INTERNAL MEDICINE

## 2020-12-02 PROCEDURE — 87426 SARSCOV CORONAVIRUS AG IA: CPT | Performed by: INTERNAL MEDICINE

## 2020-12-02 RX ORDER — AZITHROMYCIN 250 MG/1
250 TABLET, FILM COATED ORAL SEE ADMIN INSTRUCTIONS
Qty: 6 TABLET | Refills: 0 | Status: SHIPPED | OUTPATIENT
Start: 2020-12-02 | End: 2020-12-07

## 2020-12-02 RX ORDER — ACETAMINOPHEN 160 MG
TABLET,DISINTEGRATING ORAL
COMMUNITY

## 2020-12-02 NOTE — PROGRESS NOTES
20  Lowanda Bright : 1950 Sex: male  Age 79 y.o. Chief Complaint   Patient presents with    Fatigue     Symtoms for 10 days. Wife tested positive today.  Sinus Problem    Anorexia    Cough       HPI: The patient states that they have had cold and congestion for the last 10 days. States feels as if has a bad cold. States having periods of exhaustion. Admits to minimal cough which is non productive of sputum. The patient also reports nasal congestion. No sore throat. States  nasal discharge. The patient has had general malaise. No headache. Denies subjective fever and chills. Patient denies decreased appetite. No dizziness, visual disturbances and or neck stiffness. No chest pain or dyspnea. No nausea, vomiting, abdo pain  or diarrhea. States has been self quartintined. States symptoms have improved. States may have been exposed at a .  States wife was positive. The patient presents for evaluation. ROS:  Const: Positives and pertinent negatives as per HPI. All others reviewed and are negative.         Current Outpatient Medications:     Cholecalciferol (VITAMIN D3) 50 MCG (2000) CAPS, Take by mouth, Disp: , Rfl:     Zinc Sulfate (ZINC 15 PO), Take by mouth, Disp: , Rfl:     azithromycin (ZITHROMAX) 250 MG tablet, Take 1 tablet by mouth See Admin Instructions for 5 days 500mg on day 1 followed by 250mg on days 2 - 5, Disp: 6 tablet, Rfl: 0    amLODIPine (NORVASC) 5 MG tablet, Take 1 tablet by mouth daily, Disp: 90 tablet, Rfl: 3    lisinopril-hydroCHLOROthiazide (PRINZIDE;ZESTORETIC) 20-12.5 MG per tablet, take 1 tablet by mouth every morning, Disp: 90 tablet, Rfl: 3    omeprazole (PRILOSEC) 20 MG delayed release capsule, Take 1 capsule by mouth Daily, Disp: 90 capsule, Rfl: 3    Multiple Vitamins-Minerals (THERAPEUTIC MULTIVITAMIN-MINERALS) tablet, Take 1 tablet by mouth daily, Disp: , Rfl:     aspirin 81 MG EC tablet, Take 81 mg by mouth daily, Disp: , Rfl: Allergies   Allergen Reactions    Penicillins        Past Medical History:   Diagnosis Date    Colon polyps     DJD (degenerative joint disease)     GERD (gastroesophageal reflux disease)     peptic reflux disease    Headache     Hyperlipidemia     Hypertension     Osteoarthritis      No past surgical history on file. No family history on file. Social History     Socioeconomic History    Marital status: Unknown     Spouse name: Not on file    Number of children: Not on file    Years of education: Not on file    Highest education level: Not on file   Occupational History    Not on file   Social Needs    Financial resource strain: Not on file    Food insecurity     Worry: Not on file     Inability: Not on file    Transportation needs     Medical: Not on file     Non-medical: Not on file   Tobacco Use    Smoking status: Never Smoker    Smokeless tobacco: Never Used   Substance and Sexual Activity    Alcohol use: Not on file    Drug use: Not on file    Sexual activity: Not on file   Lifestyle    Physical activity     Days per week: Not on file     Minutes per session: Not on file    Stress: Not on file   Relationships    Social connections     Talks on phone: Not on file     Gets together: Not on file     Attends Spiritism service: Not on file     Active member of club or organization: Not on file     Attends meetings of clubs or organizations: Not on file     Relationship status: Not on file    Intimate partner violence     Fear of current or ex partner: Not on file     Emotionally abused: Not on file     Physically abused: Not on file     Forced sexual activity: Not on file   Other Topics Concern    Not on file   Social History Narrative    Not on file       Vitals:    12/02/20 0851   BP: 124/80   Pulse: 102   Temp: 97.8 °F (36.6 °C)   SpO2: 98%   Weight: 210 lb (95.3 kg)  Comment: estimate from pt   Height: 5' 11\" (1.803 m)       Exam:  Physical Exam  Vitals signs reviewed. Constitutional:       Appearance: He is well-developed. HENT:      Head: Normocephalic and atraumatic. Right Ear: External ear normal.      Left Ear: External ear normal.      Nose: Congestion and rhinorrhea present. Eyes:      Pupils: Pupils are equal, round, and reactive to light. Neck:      Musculoskeletal: Normal range of motion and neck supple. Thyroid: No thyromegaly. Cardiovascular:      Rate and Rhythm: Normal rate and regular rhythm. Heart sounds: Normal heart sounds. No murmur. Pulmonary:      Effort: Pulmonary effort is normal.      Breath sounds: Normal breath sounds. No wheezing or rales. Abdominal:      General: Bowel sounds are normal.      Palpations: Abdomen is soft. Tenderness: There is no abdominal tenderness. There is no guarding or rebound. Musculoskeletal: Normal range of motion. Lymphadenopathy:      Cervical: No cervical adenopathy. Skin:     General: Skin is warm and dry. Neurological:      Mental Status: He is alert and oriented to person, place, and time. Cranial Nerves: No cranial nerve deficit. Psychiatric:         Judgment: Judgment normal.         Assessment and Plan:  Sulaiman Owens was seen today for fatigue, sinus problem, anorexia and cough. Diagnoses and all orders for this visit:    Exposure to COVID-19 virus  -     POCT COVID-19, Antigen    COVID-19 virus infection  - tyelnol as needed   - fluids   - rest   - azithromycin x 5 days empiric   - cdc guidelines reviewed     Rapid COVID negative. Advised  self-quarantine at home in the interim. Pt should also be fever free for 24 hours and symptoms should be improved overall prior to returning to work if applicable. Scripts written for azithromycin.  side effects discussed. Increase fluids and rest. Symptomatic relief discussed including Tylenol prn pain/fever. Schedule virtual f/u with PCP in 7-10 days if symptoms persist. ED sooner if symptoms worsen or change.  ED immediately with high or refractory fever, progressive SOB, dyspnea, CP, calf pain/swelling, shaking chills, vomiting, abdominal pain, lethargy, flank pain, or decreased urinary output. Pt verbalizes understanding and is in agreement with plan of care. All questions answered. Follow-up with your primary care provider in 7-10 days for re-evaluation and further management. Return  sooner or go to the Emergency Department immediately if your condition changes or worsens. Return in about 1 week (around 12/9/2020) for check up and review.     Rafa Nogueira MD

## 2020-12-07 ENCOUNTER — VIRTUAL VISIT (OUTPATIENT)
Dept: PRIMARY CARE CLINIC | Age: 70
End: 2020-12-07
Payer: MEDICARE

## 2020-12-07 PROCEDURE — 99214 OFFICE O/P EST MOD 30 MIN: CPT | Performed by: FAMILY MEDICINE

## 2020-12-07 RX ORDER — OMEPRAZOLE 20 MG/1
20 CAPSULE, DELAYED RELEASE ORAL DAILY
COMMUNITY
End: 2020-12-07 | Stop reason: SDUPTHER

## 2020-12-07 RX ORDER — OMEPRAZOLE 20 MG/1
20 CAPSULE, DELAYED RELEASE ORAL DAILY
Qty: 90 CAPSULE | Refills: 1 | Status: SHIPPED
Start: 2020-12-07 | End: 2021-05-17

## 2020-12-07 RX ORDER — AMLODIPINE BESYLATE 5 MG/1
5 TABLET ORAL DAILY
Qty: 90 TABLET | Refills: 1 | Status: SHIPPED
Start: 2020-12-07 | End: 2021-05-17

## 2020-12-07 RX ORDER — AMLODIPINE BESYLATE 5 MG/1
5 TABLET ORAL DAILY
COMMUNITY
End: 2020-12-07 | Stop reason: SDUPTHER

## 2020-12-07 ASSESSMENT — ENCOUNTER SYMPTOMS
DIARRHEA: 0
NAUSEA: 0
ABDOMINAL PAIN: 0
SHORTNESS OF BREATH: 0
CONSTIPATION: 0
BACK PAIN: 0
COUGH: 0
VOMITING: 0
WHEEZING: 0

## 2020-12-07 NOTE — PROGRESS NOTES
20  Valery Arteaga : 1950 Sex: male  Age: 79 y.o. Chief Complaint   Patient presents with    Hypertension    Discuss Labs     HPI:  79 y.o. male patient presents today for 6 month(s) follow up of chronic medical conditions, medication refills and FBW. Patient's chart, medical, surgical and medication history all reviewed. TeleMedicine Patient Consent    This visit was performed as a virtual video visit using a synchronous, two-way, audio-video telehealth technology platform. Patient identification was verified at the start of the visit, including the patient's telephone number and physical location. I discussed with the patient the nature of our telehealth visits, that:     1. Due to the nature of an audio- video modality, the only components of a physical exam that could be done are the elements supported by direct observation. 2. I would evaluate the patient and recommend diagnostics and treatments based on my assessment. 3. If it was felt that the patient should be evaluated in clinic or an emergency room setting, then they would be directed there. 4. Our sessions are not being recorded and that personal health information is protected. 5. Our team would provide follow up care in person if/when the patient needs it. Patient does agree to proceed with telemedicine consultation. Patient's location: home address in [facility]. Physician location: other address in [facility]. Other people involved in call:  Namita Lee. Time spent:  Not billed by time    This visit was completed virtually using Doxy. me    Hypertension   The patient presents today for follow up of HTN. The problem is well controlled. Risk factors for coronary artery disease include Age > 27, male, HTN and elevated cholesterol. Current treatments include amlodipine (Norvasc), hydrochlorothiazide (HCTZ) and lisinopril (Prinivil). The patient is compliant all of the time.   Lifestyle changes the patient has made include dietary improvement(s) and weight management. Today the patient is complaining of none. Patient was recently diagnosed with COVID 19. Has mostly cold/flu symptoms and fatigue. Treated with Z pack. Improved at this time, but still fatigued. Hyperlipidemia  The 10-year ASCVD risk score (Sindi Cheatham, et al., 2013) is: 20.8%    Values used to calculate the score:      Age: 79 years      Sex: Male      Is Non- : No      Diabetic: No      Tobacco smoker: No      Systolic Blood Pressure: 264 mmHg      Is BP treated: Yes      HDL Cholesterol: 36 mg/dL      Total Cholesterol: 165 mg/dL    ROS:  Review of Systems   Constitutional: Positive for fatigue. Negative for chills and fever. Respiratory: Negative for cough, shortness of breath and wheezing. Cardiovascular: Negative for chest pain and palpitations. Gastrointestinal: Negative for abdominal pain, constipation, diarrhea, nausea and vomiting. Musculoskeletal: Negative for arthralgias and back pain. Skin: Negative for rash. Neurological: Negative for dizziness and headaches. Psychiatric/Behavioral: Negative for dysphoric mood. The patient is not nervous/anxious. All other systems reviewed and are negative.        Current Outpatient Medications on File Prior to Visit   Medication Sig Dispense Refill    Cholecalciferol (VITAMIN D3) 50 MCG (2000 UT) CAPS Take by mouth      Zinc Sulfate (ZINC 15 PO) Take by mouth      azithromycin (ZITHROMAX) 250 MG tablet Take 1 tablet by mouth See Admin Instructions for 5 days 500mg on day 1 followed by 250mg on days 2 - 5 6 tablet 0    lisinopril-hydroCHLOROthiazide (PRINZIDE;ZESTORETIC) 20-12.5 MG per tablet take 1 tablet by mouth every morning 90 tablet 3    Multiple Vitamins-Minerals (THERAPEUTIC MULTIVITAMIN-MINERALS) tablet Take 1 tablet by mouth daily      aspirin 81 MG EC tablet Take 81 mg by mouth daily       No current facility-administered medications on file prior to Component Value Date    LABA1C 5.7 11/18/2020     FLP:    Lab Results   Component Value Date    TRIG 129 11/18/2020    HDL 36 11/18/2020    LDLCALC 103 11/18/2020    LABVLDL 26 11/18/2020     TSH:    Lab Results   Component Value Date    TSH 0.763 11/18/2020     PSA:   Lab Results   Component Value Date    PSA 0.78 06/01/2020          Assessment and Plan:  Lee Mathis was seen today for hypertension and discuss labs. Diagnoses and all orders for this visit:    Benign essential hypertension  -     amLODIPine (NORVASC) 5 MG tablet; Take 1 tablet by mouth daily  -     CBC Auto Differential; Future  -     Comprehensive Metabolic Panel; Future  -     Lipid Panel; Future  -     TSH without Reflex; Future  -     Urinalysis; Future  Well controlled on home checks. Mixed hyperlipidemia  Improved    COVID-19 virus infection  Improving. Recommend continuing vitamins and fluid hydration. Will increase activity as tolerated. GERD without esophagitis  -     omeprazole (PRILOSEC) 20 MG delayed release capsule; Take 1 capsule by mouth daily    Vitamin D insufficiency  -     Vitamin D 25 Hydroxy; Future    Impaired fasting blood sugar  -     Hemoglobin A1C; Future          Return in about 6 months (around 6/7/2021) for AWV.       Seen By:  Luberta Libman, DO

## 2020-12-09 ENCOUNTER — TELEPHONE (OUTPATIENT)
Dept: PRIMARY CARE CLINIC | Age: 70
End: 2020-12-09

## 2020-12-09 RX ORDER — ONDANSETRON 4 MG/1
4 TABLET, FILM COATED ORAL DAILY PRN
Qty: 30 TABLET | Refills: 0 | Status: SHIPPED
Start: 2020-12-09 | End: 2021-05-17

## 2020-12-09 NOTE — TELEPHONE ENCOUNTER
Patient calling states that he had some pepper soup last night and has been having a lot of nausea since eating it. Has tried dhruv seltzer otc with no relief. Wanted to know what else he could do.

## 2021-01-25 ENCOUNTER — TELEPHONE (OUTPATIENT)
Dept: FAMILY MEDICINE CLINIC | Age: 71
End: 2021-01-25

## 2021-01-25 NOTE — TELEPHONE ENCOUNTER
I do recommend vaccination.   We don't know how long natural immunity lasts and it is still recommended to boost that immune response with a vaccine

## 2021-01-25 NOTE — TELEPHONE ENCOUNTER
That is a recommendation that we have been giving people, but it is not a rule. Simply a guideline.   It is very safe for him to get it before the 90 days

## 2021-01-25 NOTE — TELEPHONE ENCOUNTER
Patient advised. He has heard that he should wait 90 days after his infection to get the vaccine. Is that correct?

## 2021-01-25 NOTE — TELEPHONE ENCOUNTER
He is calling to ask if you recommend he take the Covid vaccine since he did test positive to it in November?

## 2021-02-12 ENCOUNTER — HOSPITAL ENCOUNTER (EMERGENCY)
Age: 71
Discharge: HOME | End: 2021-02-12
Attending: EMERGENCY MEDICINE
Payer: MEDICARE

## 2021-02-12 ENCOUNTER — IMMUNIZATION (OUTPATIENT)
Dept: PRIMARY CARE CLINIC | Age: 71
End: 2021-02-12
Payer: MEDICARE

## 2021-02-12 VITALS
HEART RATE: 92 BPM | SYSTOLIC BLOOD PRESSURE: 160 MMHG | HEIGHT: 72 IN | BODY MASS INDEX: 28.44 KG/M2 | DIASTOLIC BLOOD PRESSURE: 93 MMHG | RESPIRATION RATE: 16 BRPM | OXYGEN SATURATION: 97 % | TEMPERATURE: 98.6 F | WEIGHT: 210 LBS

## 2021-02-12 DIAGNOSIS — T78.40XA ALLERGIC REACTION, INITIAL ENCOUNTER: Primary | ICD-10-CM

## 2021-02-12 PROCEDURE — 0001A COVID-19, PFIZER VACCINE 30MCG/0.3ML DOSE: CPT | Performed by: CLINICAL NURSE SPECIALIST

## 2021-02-12 PROCEDURE — 99284 EMERGENCY DEPT VISIT MOD MDM: CPT

## 2021-02-12 PROCEDURE — 91300 COVID-19, PFIZER VACCINE 30MCG/0.3ML DOSE: CPT | Performed by: CLINICAL NURSE SPECIALIST

## 2021-02-12 RX ORDER — EPINEPHRINE 0.3 MG/.3ML
0.3 INJECTION SUBCUTANEOUS ONCE
Qty: 0.3 ML | Refills: 0 | Status: SHIPPED | OUTPATIENT
Start: 2021-02-12 | End: 2021-05-17

## 2021-02-12 ASSESSMENT — ENCOUNTER SYMPTOMS
EYE DISCHARGE: 0
SINUS PRESSURE: 0
NAUSEA: 0
EYE REDNESS: 0
SHORTNESS OF BREATH: 0
COLOR CHANGE: 0
DIARRHEA: 0
ABDOMINAL PAIN: 0
BACK PAIN: 0
SORE THROAT: 0
EYE PAIN: 0
COUGH: 0
WHEEZING: 0
VOMITING: 0

## 2021-02-12 NOTE — ED PROVIDER NOTES
[address]      Pt Name: Marcos Garcia  MRN: [id number]  [place] 1950  Date of evaluation: 2/12/2021      CHIEF COMPLAINT       Chief Complaint   Patient presents with    Allergic Reaction     throat tightness after first dose of pfizer vaccine, 25mg of benadryk given, symptoms since resolved        HPI  Marcos Garcia is a 79 y.o. male with a penicillin allergy presents with allergic reaction after receiving a fire vaccine at 945 this morning. Patient states that he received a dose with his wife was being evaluated after he got the vaccine. At that time he felt like his throat was swelling up and closing. He was given 25 mg of Benadryl at [facility] where he received a shot. States that his symptoms resolved immediately. He has not had any repeat symptoms. Per the recommendation he came to the emergency room for evaluation. Denies any fevers, chills, nausea, vomiting, chest pain, shortness of breath, abdominal pain, flank pain, dysuria, hematuria, diarrhea, constipation, new rashes or sores, difficulty swallowing, sore throat, or stridorous breath sounds. Except as noted above the remainder of the review of systems was reviewed and negative. Review of Systems   Constitutional: Negative for chills and fever. HENT: Negative for ear pain, sinus pressure and sore throat. Throat swelling. Eyes: Negative for pain, discharge and redness. Respiratory: Negative for cough, shortness of breath and wheezing. Cardiovascular: Negative for chest pain, palpitations and leg swelling. Gastrointestinal: Negative for abdominal pain, diarrhea, nausea and vomiting. Genitourinary: Negative for dysuria and frequency. Musculoskeletal: Negative for arthralgias and back pain. Skin: Negative for color change, pallor, rash and wound. Neurological: Negative for weakness and headaches. Hematological: Negative for adenopathy. All other systems reviewed and are negative. Physical Exam  Vitals signs and nursing note reviewed. Constitutional:       General: He is not in acute distress. Appearance: Normal appearance. He is well-developed. He is not ill-appearing or diaphoretic. HENT:      Head: Normocephalic and atraumatic. Mouth/Throat:      Mouth: Mucous membranes are moist.      Pharynx: Oropharynx is clear. No oropharyngeal exudate or posterior oropharyngeal erythema. Eyes:      Pupils: Pupils are equal, round, and reactive to light. Neck:      Musculoskeletal: Normal range of motion. Cardiovascular:      Rate and Rhythm: Normal rate and regular rhythm. Pulses: Normal pulses. Heart sounds: Normal heart sounds. No murmur. No friction rub. No gallop. Pulmonary:      Effort: Pulmonary effort is normal. No respiratory distress. Breath sounds: Normal breath sounds. No wheezing or rales. Abdominal:      General: Bowel sounds are normal.      Palpations: Abdomen is soft. Tenderness: There is no abdominal tenderness. There is no guarding or rebound. Skin:     General: Skin is warm and dry. Capillary Refill: Capillary refill takes less than 2 seconds. Neurological:      General: No focal deficit present. Mental Status: He is alert and oriented to person, place, and time. Cranial Nerves: No cranial nerve deficit.       Coordination: Coordination normal.   Psychiatric:         Mood and Affect: Mood normal.          Procedures     MDM  Number of Diagnoses or Management Options  Allergic reaction, initial encounter Diagnosis management comments: 66-year-old male with a past medical history of allergy to penicillins presents to the emergency room after he had an allergic reaction to his Covid vaccine. He was given 25 mg of Benadryl at the facility they gave him the vaccine and his symptoms have resolved. He was recommended come to the emergency room for evaluation. Vitals within normal limits. On physical exam patient is no acute distress, speaking full sentences, alert and oriented x3. He has no stridorous breath sounds. He is tolerating oral secretions. He has no fullness in his neck. He is not using accessory muscles to breathe. Lungs clear to auscultation bilaterally. No wheeze rales rhonchi. Normal S1-S2. Abdomen soft nontender, no rebound or guarding. Skin exam negative for acute process. He has no hives rashes or sores visualized. Patient was observed in the emergency room in case for rebound symptoms. Patient was well throughout his entire ER stay. Informed of all the results of his evaluation. He was sent home with an EpiPen for possible allergic reactions in the future. Patient is agreeable plan for discharge. Patient is awake alert, hemodynamic stable, afebrile and in no respiratory distress. Discussed with patient plan for close outpatient follow-up with the patient's PCP as well as return precautions and the patient understands and agrees to the plan.                  --------------------------------------------- PAST HISTORY ---------------------------------------------  Past Medical History:  has a past medical history of Colon polyps, DJD (degenerative joint disease), GERD (gastroesophageal reflux disease), Headache, Hyperlipidemia, Hypertension, and Osteoarthritis. Past Surgical History:  has no past surgical history on file. Social History:  reports that he has never smoked. He has never used smokeless tobacco. He reports current alcohol use. He reports that he does not use drugs. Family History: family history is not on file. The patients home medications have been reviewed. Allergies: Penicillins    -------------------------------------------------- RESULTS -------------------------------------------------  Labs:  No results found for this visit on 02/12/21. Radiology:  No orders to display       ------------------------- NURSING NOTES AND VITALS REVIEWED ---------------------------  Date / Time Roomed:  2/12/2021 10:35 AM  ED Bed Assignment:  22/22    The nursing notes within the ED encounter and vital signs as below have been reviewed. BP (!) 160/93   Pulse 92   Temp 98.6 °F (37 °C) (Oral)   Resp 16   Ht 6' (1.829 m)   Wt 210 lb (95.3 kg)   SpO2 97%   BMI 28.48 kg/m²   Oxygen Saturation Interpretation: normal      ------------------------------------------ PROGRESS NOTES ------------------------------------------    I have spoken with the patient and discussed todays results, in addition to providing specific details for the plan of care and counseling regarding the diagnosis and prognosis. Their questions are answered at this time and they are agreeable with the plan. I discussed at length with them reasons for immediate return here for re evaluation. They will followup with their PCP by calling their office tomorrow. --------------------------------- ADDITIONAL PROVIDER NOTES ---------------------------------  At this time the patient is without objective evidence of an acute process requiring hospitalization or inpatient management. They have remained hemodynamically stable throughout their entire ED visit and are stable for discharge with outpatient follow-up.

## 2021-02-12 NOTE — PROGRESS NOTES
Patient complaining of throat tightness and a chunky feeling in his throat shortly after receiving the 1st dose of pfizer covid vaccine. Patient denied chest pain or shortness of breath/dyspnea. Patient speaks in complete sentences and denies problems with swallowing. Vital signs: 154/94, pulse 106, pulse ox %, Respirations 16 non labored. Patient stated that he had covid in November 2020. Patient skin was slightly flushed upon initial exam, lung sounds were clear and equal.  No swelling noted to the tongue and throat areas. Patient was given 25mg liquid benadryl PO at 09:49 am with patient stating the throat tightness improving. Patient agreed to go to the ER for evaluation and treatment. Ambulance was called and patient was transferred to their care and taken to [facility] for further evaluation and treatment.

## 2021-02-23 ENCOUNTER — TELEPHONE (OUTPATIENT)
Dept: FAMILY MEDICINE CLINIC | Age: 71
End: 2021-02-23

## 2021-05-17 ENCOUNTER — OFFICE VISIT (OUTPATIENT)
Dept: PRIMARY CARE CLINIC | Age: 71
End: 2021-05-17
Payer: MEDICARE

## 2021-05-17 VITALS
SYSTOLIC BLOOD PRESSURE: 132 MMHG | TEMPERATURE: 98.3 F | DIASTOLIC BLOOD PRESSURE: 88 MMHG | HEIGHT: 71 IN | HEART RATE: 78 BPM | OXYGEN SATURATION: 97 % | BODY MASS INDEX: 29.12 KG/M2 | WEIGHT: 208 LBS

## 2021-05-17 DIAGNOSIS — E78.2 MIXED HYPERLIPIDEMIA: ICD-10-CM

## 2021-05-17 DIAGNOSIS — I10 BENIGN ESSENTIAL HYPERTENSION: Primary | ICD-10-CM

## 2021-05-17 DIAGNOSIS — K21.9 GERD WITHOUT ESOPHAGITIS: ICD-10-CM

## 2021-05-17 PROCEDURE — 99214 OFFICE O/P EST MOD 30 MIN: CPT | Performed by: FAMILY MEDICINE

## 2021-05-17 RX ORDER — AMLODIPINE BESYLATE 5 MG/1
5 TABLET ORAL DAILY
COMMUNITY
End: 2021-05-17 | Stop reason: SDUPTHER

## 2021-05-17 RX ORDER — OMEPRAZOLE 20 MG/1
20 CAPSULE, DELAYED RELEASE ORAL DAILY
Qty: 90 CAPSULE | Refills: 1 | Status: SHIPPED
Start: 2021-05-17 | End: 2021-06-17 | Stop reason: DRUGHIGH

## 2021-05-17 RX ORDER — OMEPRAZOLE 20 MG/1
20 CAPSULE, DELAYED RELEASE ORAL DAILY
COMMUNITY
End: 2021-05-17 | Stop reason: SDUPTHER

## 2021-05-17 RX ORDER — LISINOPRIL AND HYDROCHLOROTHIAZIDE 20; 12.5 MG/1; MG/1
TABLET ORAL
Qty: 90 TABLET | Refills: 1 | Status: SHIPPED
Start: 2021-05-17 | End: 2021-11-19 | Stop reason: SDUPTHER

## 2021-05-17 RX ORDER — AMLODIPINE BESYLATE 5 MG/1
5 TABLET ORAL DAILY
Qty: 90 TABLET | Refills: 1 | Status: SHIPPED
Start: 2021-05-17 | End: 2021-11-19 | Stop reason: SDUPTHER

## 2021-05-17 ASSESSMENT — ENCOUNTER SYMPTOMS
ABDOMINAL PAIN: 0
DIARRHEA: 0
NAUSEA: 0
BACK PAIN: 0
SHORTNESS OF BREATH: 0
VOMITING: 0
CONSTIPATION: 0
COUGH: 0
WHEEZING: 0

## 2021-05-17 ASSESSMENT — PATIENT HEALTH QUESTIONNAIRE - PHQ9
2. FEELING DOWN, DEPRESSED OR HOPELESS: 0
SUM OF ALL RESPONSES TO PHQ QUESTIONS 1-9: 0
SUM OF ALL RESPONSES TO PHQ QUESTIONS 1-9: 0

## 2021-05-17 NOTE — PROGRESS NOTES
21  Jennifer Stager : 1950 Sex: male  Age: 70 y.o. Chief Complaint   Patient presents with    Medication Refill    Other     would like to discuss the reaction to the covid vaccination that he had     HPI:  70 y.o. male patient presents today for 6 month(s) follow up of chronic medical conditions, medication refills and FBW. Patient's chart, medical, surgical and medication history all reviewed. Hypertension   The patient presents today for follow up of HTN. The problem is well controlled. Risk factors for coronary artery disease include Age > 27, male, HTN and elevated cholesterol. Current treatments include amlodipine (Norvasc), hydrochlorothiazide (HCTZ) and lisinopril (Prinivil). The patient is compliant all of the time. Lifestyle changes the patient has made include dietary improvement(s) and weight management. Today the patient is complaining of none. Hyperlipidemia  The 10-year ASCVD risk score (Gladis Owen, et al., 2013) is: 24.4%    Values used to calculate the score:      Age: 70 years      Sex: Male      Is Non- : No      Diabetic: No      Tobacco smoker: No      Systolic Blood Pressure: 468 mmHg      Is BP treated: Yes      HDL Cholesterol: 36 mg/dL      Total Cholesterol: 165 mg/dL    ROS:  Review of Systems   Constitutional: Negative for chills, fatigue and fever. Respiratory: Negative for cough, shortness of breath and wheezing. Cardiovascular: Negative for chest pain and palpitations. Gastrointestinal: Negative for abdominal pain, constipation, diarrhea, nausea and vomiting. Musculoskeletal: Negative for arthralgias and back pain. Skin: Negative for rash. Neurological: Negative for dizziness and headaches. Psychiatric/Behavioral: Negative for dysphoric mood. The patient is not nervous/anxious. All other systems reviewed and are negative.        Current Outpatient Medications on File Prior to Visit   Medication Sig Dispense Refill    Cholecalciferol (VITAMIN D3) 50 MCG (2000 UT) CAPS Take by mouth      Zinc Sulfate (ZINC 15 PO) Take by mouth      Multiple Vitamins-Minerals (THERAPEUTIC MULTIVITAMIN-MINERALS) tablet Take 1 tablet by mouth daily      aspirin 81 MG EC tablet Take 81 mg by mouth daily       No current facility-administered medications on file prior to visit. Allergies   Allergen Reactions    Covid-19 (Mrna) Vaccine Swelling     Throat swelling    Penicillins        Past Medical History:   Diagnosis Date    Colon polyps     DJD (degenerative joint disease)     GERD (gastroesophageal reflux disease)     peptic reflux disease    Headache     Hyperlipidemia     Hypertension     Osteoarthritis      History reviewed. No pertinent surgical history. History reviewed. No pertinent family history. Social History     Socioeconomic History    Marital status:      Spouse name: Not on file    Number of children: Not on file    Years of education: Not on file    Highest education level: Not on file   Occupational History    Not on file   Tobacco Use    Smoking status: Never Smoker    Smokeless tobacco: Never Used   Vaping Use    Vaping Use: Never used   Substance and Sexual Activity    Alcohol use: Yes     Comment: 3/4 times a week    Drug use: Never    Sexual activity: Not on file   Other Topics Concern    Not on file   Social History Narrative    Not on file     Social Determinants of Health     Financial Resource Strain:     Difficulty of Paying Living Expenses:    Food Insecurity:     Worried About [address] [employer] in the Last Year:     [address] in the Last Year:    Transportation Needs:     Lack of Transportation (Medical):      Lack of Transportation (Non-Medical):    Physical Activity:     Days of Exercise per Week:     Minutes of Exercise per Session:    Stress:     Feeling of Stress :    Social Connections:     Frequency of Communication with Friends and Family:     Affect: Mood and affect normal.         Speech: Speech normal.         Behavior: Behavior normal.         Thought Content: Thought content normal.         Labs:  CBC with Differential:    Lab Results   Component Value Date    WBC 6.0 11/18/2020    RBC 4.98 11/18/2020    HGB 16.2 11/18/2020    HCT 49.3 11/18/2020     11/18/2020    MCV 99.0 11/18/2020    MCH 32.5 11/18/2020    MCHC 32.9 11/18/2020    RDW 12.8 11/18/2020    LYMPHOPCT 17.3 11/18/2020    MONOPCT 9.0 11/18/2020    BASOPCT 1.0 11/18/2020    MONOSABS 0.54 11/18/2020    LYMPHSABS 1.03 11/18/2020    EOSABS 0.16 11/18/2020    BASOSABS 0.06 11/18/2020     CMP:    Lab Results   Component Value Date     11/18/2020    K 4.2 11/18/2020     11/18/2020    CO2 23 11/18/2020    BUN 14 11/18/2020    CREATININE 1.1 11/18/2020    CREATININE 1.1 04/12/2019    GFRAA >60 11/18/2020    LABGLOM >60 11/18/2020    GLUCOSE 137 11/18/2020    PROT 7.1 11/18/2020    LABALBU 4.3 11/18/2020    CALCIUM 9.7 11/18/2020    BILITOT 0.6 11/18/2020    ALKPHOS 68 11/18/2020    AST 39 11/18/2020    ALT 57 11/18/2020     HgBA1c:    Lab Results   Component Value Date    LABA1C 5.7 11/18/2020     FLP:    Lab Results   Component Value Date    TRIG 129 11/18/2020    HDL 36 11/18/2020    LDLCALC 103 11/18/2020    LABVLDL 26 11/18/2020     TSH:    Lab Results   Component Value Date    TSH 0.763 11/18/2020     PSA:   Lab Results   Component Value Date    PSA 0.78 06/01/2020          Assessment and Plan:  Fernanda Montes was seen today for medication refill and other. Diagnoses and all orders for this visit:    Benign essential hypertension  -     amLODIPine (NORVASC) 5 MG tablet; Take 1 tablet by mouth daily  -     lisinopril-hydroCHLOROthiazide (PRINZIDE;ZESTORETIC) 20-12.5 MG per tablet; take 1 tablet by mouth every morning  Well controlled. Due for fasting labs- already ordered.     Mixed hyperlipidemia  Due for labs    GERD without esophagitis  -     omeprazole (PRILOSEC) 20 MG delayed release capsule; Take 1 capsule by mouth daily  Discussed probiotics for irregular bowel habits. Return in about 6 months (around 11/17/2021) for AWV.       Seen By:  Anne Vinson DO

## 2021-06-02 DIAGNOSIS — E55.9 VITAMIN D INSUFFICIENCY: ICD-10-CM

## 2021-06-02 DIAGNOSIS — R73.01 IMPAIRED FASTING BLOOD SUGAR: ICD-10-CM

## 2021-06-02 DIAGNOSIS — I10 BENIGN ESSENTIAL HYPERTENSION: ICD-10-CM

## 2021-06-02 LAB
ALBUMIN SERPL-MCNC: 4.1 G/DL (ref 3.5–5.2)
ALP BLD-CCNC: 59 U/L (ref 40–129)
ALT SERPL-CCNC: 31 U/L (ref 0–40)
ANION GAP SERPL CALCULATED.3IONS-SCNC: 17 MMOL/L (ref 7–16)
AST SERPL-CCNC: 31 U/L (ref 0–39)
BASOPHILS ABSOLUTE: 0.07 E9/L (ref 0–0.2)
BASOPHILS RELATIVE PERCENT: 0.9 % (ref 0–2)
BILIRUB SERPL-MCNC: 0.8 MG/DL (ref 0–1.2)
BILIRUBIN URINE: NEGATIVE
BLOOD, URINE: NEGATIVE
BUN BLDV-MCNC: 13 MG/DL (ref 6–23)
CALCIUM SERPL-MCNC: 9.6 MG/DL (ref 8.6–10.2)
CHLORIDE BLD-SCNC: 102 MMOL/L (ref 98–107)
CHOLESTEROL, TOTAL: 155 MG/DL (ref 0–199)
CLARITY: CLEAR
CO2: 25 MMOL/L (ref 22–29)
COLOR: ABNORMAL
CREAT SERPL-MCNC: 1 MG/DL (ref 0.7–1.2)
EOSINOPHILS ABSOLUTE: 0.34 E9/L (ref 0.05–0.5)
EOSINOPHILS RELATIVE PERCENT: 4.5 % (ref 0–6)
GFR AFRICAN AMERICAN: >60
GFR NON-AFRICAN AMERICAN: >60 ML/MIN/1.73
GLUCOSE BLD-MCNC: 133 MG/DL (ref 74–99)
GLUCOSE URINE: NEGATIVE MG/DL
HBA1C MFR BLD: 5.6 % (ref 4–5.6)
HCT VFR BLD CALC: 44.7 % (ref 37–54)
HDLC SERPL-MCNC: 29 MG/DL
HEMOGLOBIN: 14.8 G/DL (ref 12.5–16.5)
IMMATURE GRANULOCYTES #: 0.02 E9/L
IMMATURE GRANULOCYTES %: 0.3 % (ref 0–5)
KETONES, URINE: NEGATIVE MG/DL
LDL CHOLESTEROL CALCULATED: 101 MG/DL (ref 0–99)
LEUKOCYTE ESTERASE, URINE: NEGATIVE
LYMPHOCYTES ABSOLUTE: 0.79 E9/L (ref 1.5–4)
LYMPHOCYTES RELATIVE PERCENT: 10.4 % (ref 20–42)
MCH RBC QN AUTO: 32.3 PG (ref 26–35)
MCHC RBC AUTO-ENTMCNC: 33.1 % (ref 32–34.5)
MCV RBC AUTO: 97.6 FL (ref 80–99.9)
MONOCYTES ABSOLUTE: 0.61 E9/L (ref 0.1–0.95)
MONOCYTES RELATIVE PERCENT: 8 % (ref 2–12)
NEUTROPHILS ABSOLUTE: 5.77 E9/L (ref 1.8–7.3)
NEUTROPHILS RELATIVE PERCENT: 75.9 % (ref 43–80)
NITRITE, URINE: NEGATIVE
PDW BLD-RTO: 13.5 FL (ref 11.5–15)
PH UA: 7 (ref 5–9)
PLATELET # BLD: 203 E9/L (ref 130–450)
PMV BLD AUTO: 10 FL (ref 7–12)
POTASSIUM SERPL-SCNC: 4 MMOL/L (ref 3.5–5)
PROTEIN UA: NEGATIVE MG/DL
RBC # BLD: 4.58 E12/L (ref 3.8–5.8)
SODIUM BLD-SCNC: 144 MMOL/L (ref 132–146)
SPECIFIC GRAVITY UA: 1.01 (ref 1–1.03)
TOTAL PROTEIN: 7.3 G/DL (ref 6.4–8.3)
TRIGL SERPL-MCNC: 125 MG/DL (ref 0–149)
TSH SERPL DL<=0.05 MIU/L-ACNC: 0.72 UIU/ML (ref 0.27–4.2)
UROBILINOGEN, URINE: 1 E.U./DL
VITAMIN D 25-HYDROXY: 41 NG/ML (ref 30–100)
VLDLC SERPL CALC-MCNC: 25 MG/DL
WBC # BLD: 7.6 E9/L (ref 4.5–11.5)

## 2021-06-14 ENCOUNTER — TELEPHONE (OUTPATIENT)
Dept: ADMINISTRATIVE | Age: 71
End: 2021-06-14

## 2021-06-14 ENCOUNTER — OFFICE VISIT (OUTPATIENT)
Dept: CHIROPRACTIC MEDICINE | Age: 71
End: 2021-06-14
Payer: MEDICARE

## 2021-06-14 VITALS — HEART RATE: 96 BPM | TEMPERATURE: 98.2 F | RESPIRATION RATE: 16 BRPM | OXYGEN SATURATION: 98 %

## 2021-06-14 DIAGNOSIS — S33.5XXA LUMBAR SPRAIN, INITIAL ENCOUNTER: ICD-10-CM

## 2021-06-14 DIAGNOSIS — M99.03 SEGMENTAL AND SOMATIC DYSFUNCTION OF LUMBAR REGION: Primary | ICD-10-CM

## 2021-06-14 PROCEDURE — 99213 OFFICE O/P EST LOW 20 MIN: CPT | Performed by: CHIROPRACTOR

## 2021-06-14 PROCEDURE — 98940 CHIROPRACT MANJ 1-2 REGIONS: CPT | Performed by: CHIROPRACTOR

## 2021-06-14 NOTE — PROGRESS NOTES
21  Zandra Sandhu : 1950 Sex: male  Age: 70 y.o. Chief Complaint   Patient presents with    Lower Back Pain    Back Pain     moved a couch about a week ago and has had pain & discomfort since. HPI:   Back Pain  Patient presents for evaluation of LBP. I last saw Zandra Sandhu about 8 months ago. The current symptoms have been present for 1 week and include LBP without radiation. Initial inciting event: moving a couch. lifting above waist height. Symptoms are worse No correlation. Aggravating factors identifiable by the patient are none. Alleviating factors identifiable by the patient are none. Treatments initiated by the patient: Nsaids, topical gels. Tried exercises, inversion table. Not much help    Current Outpatient Medications:     amLODIPine (NORVASC) 5 MG tablet, Take 1 tablet by mouth daily, Disp: 90 tablet, Rfl: 1    omeprazole (PRILOSEC) 20 MG delayed release capsule, Take 1 capsule by mouth daily, Disp: 90 capsule, Rfl: 1    lisinopril-hydroCHLOROthiazide (PRINZIDE;ZESTORETIC) 20-12.5 MG per tablet, take 1 tablet by mouth every morning, Disp: 90 tablet, Rfl: 1    Cholecalciferol (VITAMIN D3) 50 MCG (2000 UT) CAPS, Take by mouth, Disp: , Rfl:     Zinc Sulfate (ZINC 15 PO), Take by mouth, Disp: , Rfl:     Multiple Vitamins-Minerals (THERAPEUTIC MULTIVITAMIN-MINERALS) tablet, Take 1 tablet by mouth daily, Disp: , Rfl:     aspirin 81 MG EC tablet, Take 81 mg by mouth daily, Disp: , Rfl:     Exam:   Vitals:    21 1051   Pulse: 96   Resp: 16   Temp: 98.2 °F (36.8 °C)   SpO2: 98%     Appears well. Stands unassisted. No antalgia or list.  Alert and oriented x3. Mild flattening of the lumbar lordosis is noted. Lumbar flexion is approximately 50 degrees, extension 20 degrees, lateral flexion 15 degrees bilaterally. He is neurovascularly intact to the lower extremities. SLR testing is negative bilaterally in seated position. Kemps testing is negative bilaterally. Passive hyperextension is negative. Active hyperextension is mildly provocative. Luz testing is negative bilaterally. Ely's testing is positive bilaterally. No true midline pain in the lumbar spine nontender SI joints. No sciatic notch tenderness  There are hypertonic and tender fibers noted today in the bilateral lumbar paraspinal muscles. Joint fixation is noted with motion screening at L3-5. Maria Luisa Ray was seen today for lower back pain and back pain. Diagnoses and all orders for this visit:    Segmental and somatic dysfunction of lumbar region    Lumbar sprain, initial encounter        Treatment Plan: I spoke with Lucero Casey today regarding my exam findings and treatment options. He appears to have a lumbar strain secondary to lifting the couch. Previously, we did have x-rays which showed some multilevel degenerative changes. Recommend we try some conservative care today to consist of manipulation and home-based self-care. With his consent, I did begin today. Specifically diversified manipulation to the affected lumbar segments. Tolerated well. Advised him to use some heat at home for 15-20 minutes per session, 3 times per day. He may use topical gels or patches as needed. OTC NSAIDs or Tylenol following instructions on the bottle. If symptoms persist, I be happy to see him again. Alternatively, if he feels he needs medications he may follow-up with his PCP or express care. In the past, Lucero Casey has responded well to a visit of manipulation. We will see how he does this time around.       Seen By:  Kamran Cruz DC

## 2021-06-17 ENCOUNTER — TELEPHONE (OUTPATIENT)
Dept: PRIMARY CARE CLINIC | Age: 71
End: 2021-06-17

## 2021-06-17 RX ORDER — OMEPRAZOLE 40 MG/1
40 CAPSULE, DELAYED RELEASE ORAL
Qty: 90 CAPSULE | Refills: 1 | Status: SHIPPED
Start: 2021-06-17 | End: 2021-11-19 | Stop reason: SDUPTHER

## 2021-06-17 NOTE — TELEPHONE ENCOUNTER
Pt calling and states he wanted to make an appointment to speak with you but no appointments soon. States she has been having flare ups of stomach pain after he eats. Wondering if he has an ulcer? He states he is doing okay today and no issues but not sure what to do in the meantime.

## 2021-06-18 ENCOUNTER — TELEPHONE (OUTPATIENT)
Dept: FAMILY MEDICINE CLINIC | Age: 71
End: 2021-06-18

## 2021-06-18 ENCOUNTER — OFFICE VISIT (OUTPATIENT)
Dept: FAMILY MEDICINE CLINIC | Age: 71
End: 2021-06-18
Payer: MEDICARE

## 2021-06-18 VITALS
BODY MASS INDEX: 29.12 KG/M2 | WEIGHT: 208 LBS | SYSTOLIC BLOOD PRESSURE: 134 MMHG | HEIGHT: 71 IN | HEART RATE: 94 BPM | DIASTOLIC BLOOD PRESSURE: 80 MMHG | TEMPERATURE: 98.1 F | OXYGEN SATURATION: 97 %

## 2021-06-18 DIAGNOSIS — R10.9 ABDOMINAL CRAMPING: Primary | ICD-10-CM

## 2021-06-18 DIAGNOSIS — R53.83 FATIGUE, UNSPECIFIED TYPE: ICD-10-CM

## 2021-06-18 PROCEDURE — 99214 OFFICE O/P EST MOD 30 MIN: CPT | Performed by: INTERNAL MEDICINE

## 2021-06-18 RX ORDER — DICYCLOMINE HYDROCHLORIDE 10 MG/1
10 CAPSULE ORAL
Qty: 30 CAPSULE | Refills: 0 | Status: SHIPPED
Start: 2021-06-18 | End: 2021-06-28 | Stop reason: SDUPTHER

## 2021-06-20 ASSESSMENT — ENCOUNTER SYMPTOMS
ABDOMINAL PAIN: 1
CONSTIPATION: 1
VOMITING: 0
SHORTNESS OF BREATH: 0
NAUSEA: 1
COUGH: 0
BLOOD IN STOOL: 0

## 2021-06-20 NOTE — PROGRESS NOTES
[address] [facility] [address]     21  Jennifer Stager : 1950 Sex: male  Age: 70 y.o. Chief Complaint   Patient presents with    Fatigue     did have a yard sale couple weeks ago and hurt his back    Constipation     ever since he had covid, he has noticed that he is having more trouble with his bowels and struggling with constipation; he does experience side pain when he eats    Abdominal Pain     bloating; denies breaking out into a sweat; did throw up after he got home from standing too long       HPI  Patient presents today to express care accompanied by his wife. He states that a couple weeks ago he was lifting a lot of stuff from a yard sale and hurt his back. Saw a chiropractor on Monday who did some manipulations and seemed to help with the back but states that over the last couple days now his stomach is acting up more. He does state that he had Covid back in November and has had stomach bloating and problems with constipation since then. Last few days more cramping and feels worse. States he has had more fatigue as well. His story seems to be all over the place however the just  is that his abdominal symptoms have worsened. Cannot fully associate this he will or type of food he is eating. He is extremely anxious appearing. Does not admit to any history of irritable bowel. He had recent blood work done which I reviewed and looked okay outside of an elevated blood sugar. He states he is up-to-date with his colonoscopy. Review of Systems   Constitutional: Positive for fatigue. Negative for appetite change, chills, diaphoresis, fever and unexpected weight change. HENT: Negative. Respiratory: Negative for cough and shortness of breath. Cardiovascular: Negative for chest pain and leg swelling. Gastrointestinal: Positive for abdominal pain, constipation and nausea. Negative for blood in stool and vomiting.         Abdominal bloating and cramping   Genitourinary: Negative for dysuria, flank pain and frequency. Musculoskeletal:        Back pain is improved   Neurological: Negative for dizziness, light-headedness and headaches. REST OF PERTINENT ROS GONE OVER AND WAS NEGATIVE. Current Outpatient Medications:     dicyclomine (BENTYL) 10 MG capsule, Take 1 capsule by mouth 3 times daily (before meals), Disp: 30 capsule, Rfl: 0    omeprazole (PRILOSEC) 40 MG delayed release capsule, Take 1 capsule by mouth every morning (before breakfast) (Patient taking differently: Take 40 mg by mouth 2 times daily ), Disp: 90 capsule, Rfl: 1    amLODIPine (NORVASC) 5 MG tablet, Take 1 tablet by mouth daily, Disp: 90 tablet, Rfl: 1    lisinopril-hydroCHLOROthiazide (PRINZIDE;ZESTORETIC) 20-12.5 MG per tablet, take 1 tablet by mouth every morning, Disp: 90 tablet, Rfl: 1    Cholecalciferol (VITAMIN D3) 50 MCG (2000 UT) CAPS, Take by mouth, Disp: , Rfl:     Zinc Sulfate (ZINC 15 PO), Take by mouth, Disp: , Rfl:     Multiple Vitamins-Minerals (THERAPEUTIC MULTIVITAMIN-MINERALS) tablet, Take 1 tablet by mouth daily, Disp: , Rfl:     aspirin 81 MG EC tablet, Take 81 mg by mouth daily, Disp: , Rfl:   Allergies   Allergen Reactions    Covid-19 (Mrna) Vaccine Swelling     Throat swelling    Penicillins        Past Medical History:   Diagnosis Date    Colon polyps     DJD (degenerative joint disease)     GERD (gastroesophageal reflux disease)     peptic reflux disease    Headache     Hyperlipidemia     Hypertension     Osteoarthritis      No past surgical history on file. No family history on file.   Social History     Socioeconomic History    Marital status:      Spouse name: Not on file    Number of children: Not on file    Years of education: Not on file    Highest education level: Not on file   Occupational History    Not on file   Tobacco Use    Smoking status: Never Smoker    Smokeless tobacco: Never Used   Vaping Use    Vaping Use: Never used Musculoskeletal:         General: Normal range of motion. Cervical back: Normal range of motion and neck supple. No tenderness. Lymphadenopathy:      Cervical: No cervical adenopathy. Skin:     General: Skin is warm and dry. Neurological:      General: No focal deficit present. Mental Status: He is alert and oriented to person, place, and time. Psychiatric:      Comments: Very anxious appearing                 Assessment and Plan:  Sasha Colunga was seen today for fatigue, constipation and abdominal pain. Diagnoses and all orders for this visit:    Abdominal cramping  -     XR ABDOMEN (KUB) (SINGLE AP VIEW); Future  -     US GALLBLADDER RUQ; Future    Fatigue, unspecified type    Other orders  -     dicyclomine (BENTYL) 10 MG capsule; Take 1 capsule by mouth 3 times daily (before meals)    Plan: Really do not have a source for his symptoms at this time. I do have some suspicion of gallbladder. We will do a gallbladder ultrasound as well as plain film x-ray/KUB and start him on some Bentyl. Follow-up with PCP in the next week to review all of this. To the emergency room over the weekend if any worsening. Return in about 2 weeks (around 7/2/2021). Seen By:  Giovanni Alston MD      *Document was created using voice recognition software. Note was reviewed however may contain grammatical errors.

## 2021-06-21 ENCOUNTER — TELEPHONE (OUTPATIENT)
Dept: FAMILY MEDICINE CLINIC | Age: 71
End: 2021-06-21

## 2021-06-21 ENCOUNTER — OFFICE VISIT (OUTPATIENT)
Dept: CHIROPRACTIC MEDICINE | Age: 71
End: 2021-06-21
Payer: MEDICARE

## 2021-06-21 VITALS — OXYGEN SATURATION: 96 % | RESPIRATION RATE: 16 BRPM | TEMPERATURE: 98.3 F | HEART RATE: 91 BPM

## 2021-06-21 DIAGNOSIS — K86.9 PANCREATIC LESION: ICD-10-CM

## 2021-06-21 DIAGNOSIS — M47.816 SPONDYLOSIS OF LUMBAR REGION WITHOUT MYELOPATHY OR RADICULOPATHY: ICD-10-CM

## 2021-06-21 DIAGNOSIS — K76.0 FATTY LIVER: ICD-10-CM

## 2021-06-21 DIAGNOSIS — S33.5XXA LUMBAR SPRAIN, INITIAL ENCOUNTER: ICD-10-CM

## 2021-06-21 DIAGNOSIS — R10.84 GENERALIZED ABDOMINAL PAIN: Primary | ICD-10-CM

## 2021-06-21 DIAGNOSIS — M99.03 SEGMENTAL AND SOMATIC DYSFUNCTION OF LUMBAR REGION: Primary | ICD-10-CM

## 2021-06-21 PROCEDURE — 98940 CHIROPRACT MANJ 1-2 REGIONS: CPT | Performed by: CHIROPRACTOR

## 2021-06-21 NOTE — TELEPHONE ENCOUNTER
Gallbladder ultrasound demonstrating sludge in the gallbladder without other inflammatory changes. Fatty liver is noted. Also an abnormality in the pancreas that will need further follow-up with a CAT scan. Patient needs to follow-up with Dr. Munguia Members and she can go over this with him.     Stress compliance with follow-up

## 2021-06-21 NOTE — PROGRESS NOTES
21  Fermin Lambert : 1950 Sex: male  Age: 70 y.o. Chief Complaint   Patient presents with    Lower Back Pain       HPI:   Pain is has improved. On average, pain is perceived as mild (1-3  pain scale). States he had some overlapping abdominal issues. Was placed on some medications which helped the stomach. He goes for gallbladder ultrasound this morning. Overall his back is doing better. No new issues. He is performing ADLs as desired. Continuing with home stretches. Current Outpatient Medications:     dicyclomine (BENTYL) 10 MG capsule, Take 1 capsule by mouth 3 times daily (before meals), Disp: 30 capsule, Rfl: 0    omeprazole (PRILOSEC) 40 MG delayed release capsule, Take 1 capsule by mouth every morning (before breakfast) (Patient taking differently: Take 40 mg by mouth 2 times daily ), Disp: 90 capsule, Rfl: 1    amLODIPine (NORVASC) 5 MG tablet, Take 1 tablet by mouth daily, Disp: 90 tablet, Rfl: 1    lisinopril-hydroCHLOROthiazide (PRINZIDE;ZESTORETIC) 20-12.5 MG per tablet, take 1 tablet by mouth every morning, Disp: 90 tablet, Rfl: 1    Cholecalciferol (VITAMIN D3) 50 MCG ( UT) CAPS, Take by mouth, Disp: , Rfl:     Zinc Sulfate (ZINC 15 PO), Take by mouth, Disp: , Rfl:     Multiple Vitamins-Minerals (THERAPEUTIC MULTIVITAMIN-MINERALS) tablet, Take 1 tablet by mouth daily, Disp: , Rfl:     aspirin 81 MG EC tablet, Take 81 mg by mouth daily, Disp: , Rfl:     Exam:   Vitals:    21 0807   Pulse: 91   Resp: 16   Temp: 98.3 °F (36.8 °C)   SpO2: 96%         There are hypertonic and tender fibers noted today in the bilateral lumbar paraspinal muscles. No midline pain joint fixation is noted with motion screening at L3-4, bilateral SI joints. Олег Butts was seen today for lower back pain.     Diagnoses and all orders for this visit:    Segmental and somatic dysfunction of lumbar region    Lumbar sprain, initial encounter    Spondylosis of lumbar region without myelopathy or radiculopathy        Treatment Plan: Continue diversified manipulation today to listed lumbar and SI joints. Tolerated well.   He can follow-up with me as needed for further care      Seen By:  Krystal Hughes

## 2021-06-22 NOTE — TELEPHONE ENCOUNTER
I know you are out of office this week. Would you like patient fit in on your schedule sometime next week? Would you like the CT scan ordered in the mean time so that it can be set up?  Please advise

## 2021-06-28 RX ORDER — DICYCLOMINE HYDROCHLORIDE 10 MG/1
10 CAPSULE ORAL
Qty: 90 CAPSULE | Refills: 5 | Status: SHIPPED
Start: 2021-06-28 | End: 2021-09-02

## 2021-07-01 ENCOUNTER — OFFICE VISIT (OUTPATIENT)
Dept: PRIMARY CARE CLINIC | Age: 71
End: 2021-07-01
Payer: MEDICARE

## 2021-07-01 VITALS
WEIGHT: 194 LBS | DIASTOLIC BLOOD PRESSURE: 82 MMHG | HEIGHT: 71 IN | HEART RATE: 96 BPM | TEMPERATURE: 96.7 F | SYSTOLIC BLOOD PRESSURE: 134 MMHG | OXYGEN SATURATION: 98 % | BODY MASS INDEX: 27.16 KG/M2

## 2021-07-01 DIAGNOSIS — R10.9 ABDOMINAL CRAMPING: ICD-10-CM

## 2021-07-01 DIAGNOSIS — K21.9 GERD WITHOUT ESOPHAGITIS: ICD-10-CM

## 2021-07-01 DIAGNOSIS — R19.02 LEFT UPPER QUADRANT ABDOMINAL MASS: ICD-10-CM

## 2021-07-01 DIAGNOSIS — K82.8 GALLBLADDER SLUDGE: Primary | ICD-10-CM

## 2021-07-01 PROCEDURE — 99214 OFFICE O/P EST MOD 30 MIN: CPT | Performed by: FAMILY MEDICINE

## 2021-07-01 ASSESSMENT — ENCOUNTER SYMPTOMS
SHORTNESS OF BREATH: 0
ABDOMINAL PAIN: 1
NAUSEA: 0
COUGH: 0
CONSTIPATION: 0
WHEEZING: 0
BACK PAIN: 0
VOMITING: 0
DIARRHEA: 0

## 2021-07-01 NOTE — PROGRESS NOTES
21  Valery Jenni : 1950 Sex: male  Age: 70 y.o. Chief Complaint   Patient presents with    Results     review US, has CT scheduled tomorrow     HPI:  70 y.o. male presents today for [facility] follow up. Patient's chart, medical, surgical and medication history all reviewed. [facility] follow up  Date seen in the [facility]: 21  Symptoms: Abdominal cramping, constipation, fatigue  Labs/Imaging: RUQ US- GB sludge, Fatty liver, 7x6x4 cm complex hypoechoic area in the pancreas; KUB- normal  Diagnosis: Abdominal cramping   Treatments: Bentyl    Patient notes that he has been feeling better since starting Bentyl. Much less abdominal cramping. Moving bowels better. He has CT A/P scheduled tomorrow to better delineate the lesion noted on US near the pancreas. He does note that he has had some weight loss since seen in . Not eating as well. ROS:  Review of Systems   Constitutional: Positive for unexpected weight change (loss). Negative for chills, fatigue and fever. Respiratory: Negative for cough, shortness of breath and wheezing. Cardiovascular: Negative for chest pain and palpitations. Gastrointestinal: Positive for abdominal pain. Negative for constipation, diarrhea, nausea and vomiting. Musculoskeletal: Negative for arthralgias and back pain. Skin: Negative for rash. Neurological: Negative for dizziness and headaches. Psychiatric/Behavioral: Negative for dysphoric mood. The patient is not nervous/anxious. All other systems reviewed and are negative.        Current Outpatient Medications on File Prior to Visit   Medication Sig Dispense Refill    dicyclomine (BENTYL) 10 MG capsule Take 1 capsule by mouth 3 times daily (before meals) 90 capsule 5    omeprazole (PRILOSEC) 40 MG delayed release capsule Take 1 capsule by mouth every morning (before breakfast) (Patient taking differently: Take 40 mg by mouth 2 times daily ) 90 capsule 1    amLODIPine (NORVASC) 5 MG tablet Take 1 tablet by mouth daily 90 tablet 1    lisinopril-hydroCHLOROthiazide (PRINZIDE;ZESTORETIC) 20-12.5 MG per tablet take 1 tablet by mouth every morning 90 tablet 1    Cholecalciferol (VITAMIN D3) 50 MCG (2000 UT) CAPS Take by mouth      Zinc Sulfate (ZINC 15 PO) Take by mouth      Multiple Vitamins-Minerals (THERAPEUTIC MULTIVITAMIN-MINERALS) tablet Take 1 tablet by mouth daily      aspirin 81 MG EC tablet Take 81 mg by mouth daily       No current facility-administered medications on file prior to visit. Allergies   Allergen Reactions    Covid-19 (Mrna) Vaccine Swelling     Throat swelling    Penicillins        Past Medical History:   Diagnosis Date    Colon polyps     DJD (degenerative joint disease)     GERD (gastroesophageal reflux disease)     peptic reflux disease    Headache     Hyperlipidemia     Hypertension     Osteoarthritis      History reviewed. No pertinent surgical history. History reviewed. No pertinent family history. Social History     Socioeconomic History    Marital status:      Spouse name: Not on file    Number of children: Not on file    Years of education: Not on file    Highest education level: Not on file   Occupational History    Not on file   Tobacco Use    Smoking status: Never Smoker    Smokeless tobacco: Never Used   Vaping Use    Vaping Use: Never used   Substance and Sexual Activity    Alcohol use: Yes     Comment: 3/4 times a week    Drug use: Never    Sexual activity: Not on file   Other Topics Concern    Not on file   Social History Narrative    Not on file     Social Determinants of Health     Financial Resource Strain:     Difficulty of Paying Living Expenses:    Food Insecurity:     Worried About [address] in the Last Year:     [address] in the Last Year:    Transportation Needs:     Lack of Transportation (Medical):      Lack of Transportation (Non-Medical):    Physical Activity:     Days of Exercise per Week:     Minutes of Exercise per Session:    Stress:     Feeling of Stress :    Social Connections:     Frequency of Communication with Friends and Family:     Frequency of Social Gatherings with Friends and Family:     Attends Buddhist Services:     Active Member of Clubs or Organizations:     Attends Club or Organization Meetings:     Marital Status:    Intimate Partner Violence:     Fear of Current or Ex-Partner:     Emotionally Abused:     Physically Abused:     Sexually Abused:        Vitals:    07/01/21 0815   BP: 134/82   Pulse: 96   Temp: 96.7 °F (35.9 °C)   SpO2: 98%   Weight: 194 lb (88 kg)   Height: 5' 11\" (1.803 m)       Physical Exam:  Physical Exam  Vitals and nursing note reviewed. Constitutional:       General: He is not in acute distress. Appearance: Normal appearance. He is well-developed and normal weight. He is not ill-appearing. HENT:      Head: Normocephalic and atraumatic. Right Ear: Hearing and external ear normal.      Left Ear: Hearing and external ear normal.      Nose:      Comments: Wearing mask  Eyes:      General: Lids are normal. No scleral icterus. Extraocular Movements: Extraocular movements intact. Conjunctiva/sclera: Conjunctivae normal.   Neck:      Thyroid: No thyromegaly. Comments: Mobile mass to L neck. Consistent with lipoma  Cardiovascular:      Rate and Rhythm: Normal rate and regular rhythm. Heart sounds: Normal heart sounds. No murmur heard. Pulmonary:      Effort: Pulmonary effort is normal. No respiratory distress. Breath sounds: Normal breath sounds. No wheezing. Abdominal:      General: Abdomen is flat. Bowel sounds are normal.      Palpations: Abdomen is soft. There is no mass. Tenderness: There is no abdominal tenderness. Musculoskeletal:         General: No tenderness or deformity. Normal range of motion. Cervical back: Normal range of motion and neck supple. Right lower leg: No edema. Left lower leg: No edema. Lymphadenopathy:      Cervical: No cervical adenopathy. Skin:     General: Skin is warm and dry. Findings: No rash. Neurological:      General: No focal deficit present. Mental Status: He is alert and oriented to person, place, and time. Gait: Gait normal.   Psychiatric:         Mood and Affect: Mood and affect normal.         Speech: Speech normal.         Behavior: Behavior normal.         Thought Content:  Thought content normal.         Labs:  CBC with Differential:    Lab Results   Component Value Date    WBC 7.6 06/02/2021    RBC 4.58 06/02/2021    HGB 14.8 06/02/2021    HCT 44.7 06/02/2021     06/02/2021    MCV 97.6 06/02/2021    MCH 32.3 06/02/2021    MCHC 33.1 06/02/2021    RDW 13.5 06/02/2021    LYMPHOPCT 10.4 06/02/2021    MONOPCT 8.0 06/02/2021    BASOPCT 0.9 06/02/2021    MONOSABS 0.61 06/02/2021    LYMPHSABS 0.79 06/02/2021    EOSABS 0.34 06/02/2021    BASOSABS 0.07 06/02/2021     CMP:    Lab Results   Component Value Date     06/02/2021    K 4.0 06/02/2021     06/02/2021    CO2 25 06/02/2021    BUN 13 06/02/2021    CREATININE 1.0 06/02/2021    CREATININE 1.1 04/12/2019    GFRAA >60 06/02/2021    LABGLOM >60 06/02/2021    GLUCOSE 133 06/02/2021    PROT 7.3 06/02/2021    LABALBU 4.1 06/02/2021    CALCIUM 9.6 06/02/2021    BILITOT 0.8 06/02/2021    ALKPHOS 59 06/02/2021    AST 31 06/02/2021    ALT 31 06/02/2021     U/A:    Lab Results   Component Value Date    COLORU DARK YELLOW 06/02/2021    PROTEINU Negative 06/02/2021    PHUR 7.0 06/02/2021    CLARITYU Clear 06/02/2021    SPECGRAV 1.010 06/02/2021    LEUKOCYTESUR Negative 06/02/2021    UROBILINOGEN 1.0 06/02/2021    BILIRUBINUR Negative 06/02/2021    BLOODU Negative 06/02/2021    GLUCOSEU Negative 06/02/2021     HgBA1c:    Lab Results   Component Value Date    LABA1C 5.6 06/02/2021     FLP:    Lab Results   Component Value Date    TRIG 125 06/02/2021    HDL 29 06/02/2021    LDLCALC 101 06/02/2021    LABVLDL 25 06/02/2021     TSH:    Lab Results   Component Value Date    TSH 0.720 06/02/2021     PSA:   Lab Results   Component Value Date    PSA 0.78 06/01/2020        Assessment and Plan:  Lev Juarez was seen today for results. Diagnoses and all orders for this visit:    Gallbladder sludge    Left upper quadrant abdominal mass    Abdominal cramping    GERD without esophagitis    Reviewed imaging and results with patient. Discussed the pathophys of gallbladder and sludge presence. No current signs of infection. Discussed dietary habits. WIll await CT results to determine need for further steps. Return after testing.       Seen By:  Dariusz Bradley DO

## 2021-07-02 ENCOUNTER — HOSPITAL ENCOUNTER (OUTPATIENT)
Dept: CT IMAGING | Age: 71
Discharge: HOME | End: 2021-07-04
Payer: MEDICARE

## 2021-07-02 DIAGNOSIS — K86.9 PANCREATIC LESION: ICD-10-CM

## 2021-07-02 DIAGNOSIS — R10.84 GENERALIZED ABDOMINAL PAIN: ICD-10-CM

## 2021-07-02 DIAGNOSIS — K76.0 FATTY LIVER: ICD-10-CM

## 2021-07-02 PROCEDURE — 74177 CT ABD & PELVIS W/CONTRAST: CPT

## 2021-07-02 PROCEDURE — 6360000004 HC RX CONTRAST MEDICATION: Performed by: RADIOLOGY

## 2021-07-02 RX ADMIN — IOPAMIDOL 75 ML: 755 INJECTION, SOLUTION INTRAVENOUS at 17:56

## 2021-07-02 RX ADMIN — IOHEXOL 50 ML: 240 INJECTION, SOLUTION INTRATHECAL; INTRAVASCULAR; INTRAVENOUS; ORAL at 17:56

## 2021-07-03 DIAGNOSIS — K86.2 CYSTIC MASS OF PANCREAS: Primary | ICD-10-CM

## 2021-07-12 ENCOUNTER — TELEPHONE (OUTPATIENT)
Dept: ADMINISTRATIVE | Age: 71
End: 2021-07-12

## 2021-07-12 ENCOUNTER — NURSE TRIAGE (OUTPATIENT)
Dept: OTHER | Facility: CLINIC | Age: 71
End: 2021-07-12

## 2021-07-23 ENCOUNTER — OFFICE VISIT (OUTPATIENT)
Dept: HEMATOLOGY | Age: 71
End: 2021-07-23
Payer: MEDICARE

## 2021-07-23 VITALS
OXYGEN SATURATION: 98 % | SYSTOLIC BLOOD PRESSURE: 130 MMHG | HEIGHT: 71 IN | RESPIRATION RATE: 16 BRPM | WEIGHT: 187.9 LBS | HEART RATE: 109 BPM | TEMPERATURE: 98.3 F | BODY MASS INDEX: 26.31 KG/M2 | DIASTOLIC BLOOD PRESSURE: 94 MMHG

## 2021-07-23 DIAGNOSIS — K86.89 PANCREATIC MASS: Primary | ICD-10-CM

## 2021-07-23 PROCEDURE — 99213 OFFICE O/P EST LOW 20 MIN: CPT | Performed by: TRANSPLANT SURGERY

## 2021-07-23 PROCEDURE — 99204 OFFICE O/P NEW MOD 45 MIN: CPT | Performed by: TRANSPLANT SURGERY

## 2021-07-23 RX ORDER — PANCRELIPASE LIPASE, PANCRELIPASE AMYLASE, AND PANCRELIPASE PROTEASE 149900; 37000; 97300 [USP'U]/1; [USP'U]/1; [USP'U]/1
72000 CAPSULE, DELAYED RELEASE ORAL
Qty: 250 CAPSULE | Refills: 2 | Status: SHIPPED
Start: 2021-07-23 | End: 2021-07-26

## 2021-07-23 NOTE — PROGRESS NOTES
Hepatobiliary and Pancreatic Surgery Attending History and Physical    Patient's Name/Date of Birth: Dale Ross /1950 (8 y.o.)    Date: July 23, 2021     CC:Pancreatic cyst    HPI:  Patient is a very pleasant 70year old male whom states that he had [place] in November and had some abdominal pain. His epigastric abdominal pain has been off and on since then. He states that his pain started in his epigastrium and right upper quadrant and wraps around to his back. He has a lack of appetite and is lethargic. He has lost weight. He states that he has worsening reflux and nausea. He saw Dr. Deisy Cote and had an [address] then a CT. She denies any diarrhea. He states that for the past 3-4 weeks he is taking bentyl. When the pain happens he doesn't eat and sticks to a liquid diet and then he feels better. He states that when he eats a fatty meal it seems to trigger his attack. He has never had pancreatitis. He has lost 20 lbs. He does drink 1 alcoholic beverage nightly with a night cap but when he has pain he does not drink. He denies any smoking.   And currently he denies any pain      Family history: melanoma in 1980    Past Medical History:   Diagnosis Date    Colon polyps     DJD (degenerative joint disease)     GERD (gastroesophageal reflux disease)     peptic reflux disease    Headache     Hyperlipidemia     Hypertension     Osteoarthritis        PSH: mealnoma excision 1980     Current Outpatient Medications   Medication Sig Dispense Refill    dicyclomine (BENTYL) 10 MG capsule Take 1 capsule by mouth 3 times daily (before meals) 90 capsule 5    omeprazole (PRILOSEC) 40 MG delayed release capsule Take 1 capsule by mouth every morning (before breakfast) (Patient taking differently: Take 40 mg by mouth 2 times daily ) 90 capsule 1    amLODIPine (NORVASC) 5 MG tablet Take 1 tablet by mouth daily 90 tablet 1    lisinopril-hydroCHLOROthiazide (PRINZIDE;ZESTORETIC) 20-12.5 MG per tablet take 1 tablet by mouth every morning 90 tablet 1    Cholecalciferol (VITAMIN D3) 50 MCG (2000 UT) CAPS Take by mouth      Zinc Sulfate (ZINC 15 PO) Take by mouth      Multiple Vitamins-Minerals (THERAPEUTIC MULTIVITAMIN-MINERALS) tablet Take 1 tablet by mouth daily      aspirin 81 MG EC tablet Take 81 mg by mouth daily       No current facility-administered medications for this visit. Allergies   Allergen Reactions    Covid-19 (Mrna) Vaccine Swelling     Throat swelling    Penicillins        Social History     Socioeconomic History    Marital status:      Spouse name: Not on file    Number of children: Not on file    Years of education: Not on file    Highest education level: Not on file   Occupational History    Not on file   Tobacco Use    Smoking status: Never Smoker    Smokeless tobacco: Never Used   Vaping Use    Vaping Use: Never used   Substance and Sexual Activity    Alcohol use: Yes     Comment: 3/4 times a week    Drug use: Never    Sexual activity: Not on file   Other Topics Concern    Not on file   Social History Narrative    Not on file     Social Determinants of Health     Financial Resource Strain:     Difficulty of Paying Living Expenses:    Food Insecurity:     Worried About Running Out of Food in the Last Year:     [address] in the Last Year:    Transportation Needs:     Lack of Transportation (Medical):      Lack of Transportation (Non-Medical):    Physical Activity:     Days of Exercise per Week:     Minutes of Exercise per Session:    Stress:     Feeling of Stress :    Social Connections:     Frequency of Communication with Friends and Family:     Frequency of Social Gatherings with Friends and Family:     Attends Synagogue Services:     Active Member of Clubs or Organizations:     Attends Club or Organization Meetings:     Marital Status:    Intimate Partner Violence:     Fear of Current or Ex-Partner:     Emotionally Abused:     Physically Abused:     Sexually Abused:        ROS:   Review of Systems   Constitutional: Positive for activity change, appetite change and unexpected weight change. Negative for chills, diaphoresis and fever. HENT: Negative for congestion, ear discharge, ear pain, hearing loss, nosebleeds and tinnitus. Eyes: Negative for photophobia, pain and discharge. Respiratory: Negative for shortness of breath. Cardiovascular: Negative for palpitations and leg swelling. Gastrointestinal: Positive for abdominal pain. Negative for blood in stool, constipation, diarrhea, nausea and vomiting. Endocrine: Negative for polydipsia. Genitourinary: Negative for frequency, hematuria and urgency. Musculoskeletal: Negative for back pain and neck pain. Skin: Negative for rash. Allergic/Immunologic: Negative for environmental allergies. Neurological: Negative for tremors and seizures. Psychiatric/Behavioral: Negative for hallucinations and suicidal ideas. The patient is not nervous/anxious. Physical Exam:  Vitals:    07/23/21 1323   BP: (!) 130/94   Pulse: 109   Resp: 16   Temp: 98.3 °F (36.8 °C)   SpO2: 98%       PSYCH: mood and affect normal, alert and oriented x 3: No apparent distress, comfortable  EYES: Sclera white, pupils equal round and reactive to light  ENMT:  Hearing normal, trachea midline, ears externally intact  LYMPH: no obvious lympadenopathy in neck. RESP: Respiratory effort was normal with no retractions or use of accessory muscles. 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 - we discussed that his history sounds like the above.   - EUS  - start pancreaze 72k  - low fat diet no more than 20g - chronic pancreatitis cook book given to patient  - will need cholecystectomy once EUS performed    45 Minutes of which greater than 50% was spent counseling or coordinating his care. Thank you for the consultation allowing me to take part in Mr. Mccullough's care.      Caleb Maier M.D.  7/23/2021  1:31 PM

## 2021-07-25 ASSESSMENT — ENCOUNTER SYMPTOMS
VOMITING: 0
CONSTIPATION: 0
NAUSEA: 0
SHORTNESS OF BREATH: 0
ABDOMINAL PAIN: 1
DIARRHEA: 0
PHOTOPHOBIA: 0
EYE PAIN: 0
BLOOD IN STOOL: 0
BACK PAIN: 0
EYE DISCHARGE: 0

## 2021-07-26 ENCOUNTER — TELEPHONE (OUTPATIENT)
Dept: HEMATOLOGY | Age: 71
End: 2021-07-26

## 2021-07-26 RX ORDER — PANCRELIPASE 36000; 180000; 114000 [USP'U]/1; [USP'U]/1; [USP'U]/1
CAPSULE, DELAYED RELEASE PELLETS ORAL
Qty: 250 CAPSULE | Refills: 5 | Status: SHIPPED | OUTPATIENT
Start: 2021-07-26 | End: 2021-11-19

## 2021-07-26 NOTE — TELEPHONE ENCOUNTER
[facility] in [place] called and stated that the patients pancreaze needed a prior auth. I tried to get the prior auth but they would not cover the medication. The insurance company asked that we switch it to creon or bailee-pep. I called the pharmacy above and the technician ran both a script through for zenpep and creon and both were $84.00 through the patients insurance. I reached out to lucia lyon in prescription assistance and gave her the patients info so she can reach out to the patient to see if they are eligible for any help. I did call the patient and explained the above to him. I also explained to the patient that lucia will reach out for further info, the patient was in agreement with this. I told the patient that in the meantime we do have samples here in our office and he can come pick some up. I explained to the patient that usually I would be out in [place] next week and would bring him some but our office is cancelled.  The patient confirmed and stated he would just like lucia to reach out to him for now and see where that takes him  ,Electronically signed by Jamil Ruiz MA on 7/26/2021 at 12:33 PM

## 2021-07-29 ENCOUNTER — TELEPHONE (OUTPATIENT)
Dept: SURGERY | Age: 71
End: 2021-07-29

## 2021-07-29 NOTE — TELEPHONE ENCOUNTER
Prior Authorization Form:      DEMOGRAPHICS:                     Patient Name:  Camillo Cogan  Patient :  1950            Insurance:  Payor: Jennifer Ro / Plan: Tino Arenas ESSENTIAL/PLUS / Product Type: *No Product type* /   Insurance ID Number:    Payor/Plan Subscr  Sex Relation Sub. Ins. ID Effective Group Num   1.  Georgi A 1950 Male Self [id number]                                    [id number]         DIAGNOSIS & PROCEDURE:                       Procedure/Operation: EUS           CPT Code: 18250    Diagnosis:  Pancreatic Mass    ICD10 Code: K86.89    Location:  [address] [place] [address]    Surgeon:  [facility]    SCHEDULING INFORMATION:                          Date: 2021    Time: TBD              Anesthesia:  MAC/TIVA                                                       Status:  Outpatient        Special Comments:         Electronically signed by Danielle Diaz on 2021 at 7:28 AM

## 2021-07-29 NOTE — TELEPHONE ENCOUNTER
Per the order of Dr. Manjit Almanza, patient has been scheduled for EUS  on 8.9.2021. Patient provided with procedure information over the phone and verbalized understanding. Written information also mailed to patient. Patient will follow up with Dr. Jeremiah Maurice. Patient instructed to please contact our office with any questions. Surgery scheduling form faxed to [facility] surgery scheduling and fax confirmation received. Dr. Odell Champ to enter orders.    Electronically signed by Tere Bryant on 7/29/21 at 7:28 AM EDT

## 2021-08-04 ENCOUNTER — NURSE ONLY (OUTPATIENT)
Dept: FAMILY MEDICINE CLINIC | Age: 71
End: 2021-08-04

## 2021-08-04 ENCOUNTER — HOSPITAL ENCOUNTER (OUTPATIENT)
Age: 71
Discharge: HOME | End: 2021-08-06
Payer: MEDICARE

## 2021-08-04 DIAGNOSIS — Z01.818 PREOP TESTING: Primary | ICD-10-CM

## 2021-08-04 PROCEDURE — U0003 INFECTIOUS AGENT DETECTION BY NUCLEIC ACID (DNA OR RNA); SEVERE ACUTE RESPIRATORY SYNDROME CORONAVIRUS 2 (SARS-COV-2) (CORONAVIRUS DISEASE [COVID-19]), AMPLIFIED PROBE TECHNIQUE, MAKING USE OF HIGH THROUGHPUT TECHNOLOGIES AS DESCRIBED BY CMS-2020-01-R: HCPCS

## 2021-08-04 PROCEDURE — U0005 INFEC AGEN DETEC AMPLI PROBE: HCPCS

## 2021-08-04 PROCEDURE — 99999 PR OFFICE/OUTPT VISIT,PROCEDURE ONLY: CPT | Performed by: FAMILY MEDICINE

## 2021-08-05 DIAGNOSIS — Z01.818 PREOP TESTING: ICD-10-CM

## 2021-08-06 LAB
SARS-COV-2: NOT DETECTED
SOURCE: NORMAL

## 2021-08-06 NOTE — PROGRESS NOTES
[address]                                                                                                                     PRE OP INSTRUCTIONS FOR  Maria L Lee        Date: 8/6/2021    Date and time of surgery: 8/9/21   Arrival Time: 0630    1. Do not eat or drink anything after 12 midnight prior to surgery. This includes no water, chewing gum, mints or ice chips. 2. Take the following pills with a small sip of water on the morning of Surgery: None     3. Diabetics may take evening dose of insulin but none after midnight. If you feel symptomatic or low blood sugar take 1-2 ounces of apple juice only. 4. Aspirin, Ibuprofen, Advil, Naproxen, Vitamin E and other Anti-inflammatory products should be stopped  before surgery  as directed by your physician. 5. Check with your Doctor regarding stopping Plavix, Coumadin, Lovenox, Fragmin or other blood thinners. 6. Do not smoke,use illicit drugs and do not drink any alcoholic beverages 24 hours prior to surgery. 7. You may brush your teeth and gargle the morning of surgery. DO NOT SWALLOW WATER    8. You MUST make arrangements for a responsible adult to take you home after your surgery. You will not be allowed to leave alone or drive yourself home. It is strongly suggested someone stay with you the first 24 hrs. Your surgery will be cancelled if you do not have a ride home. 9. A parent/legal guardian must accompany a child scheduled for surgery and plan to stay at the hospital until the child is discharged. Please do not bring other children with you. 10. Please wear simple, loose fitting clothing to the hospital.  Tony Herb not bring valuables (money, credit cards, checkbooks, etc.) Do not wear any makeup (including no eye makeup) or nail polish on your fingers or toes. 11. DO NOT wear any jewelry or piercings on day of surgery. All body piercing jewelry must be removed. 12.  Shower the night before surgery with _X__Antibacterial soap ___Hibiclens. 15. Remember to bring Blood Bank bracelet to the hospital on the day of surgery. 14. If you have a Living Will and Durable Power of  for Healthcare, please bring in a copy. 15. If appropriate bring crutches, inspirex, etc... 12. Notify your Surgeon if you develop any illness between now and surgery time, cough, cold, fever, sore throat, nausea, vomiting, etc.  Please notify your surgeon if you experience dizziness, shortness of breath or blurred vision between now & the time of your surgery. 17. If you have ___dentures, they will be removed before going to the OR; we will provide you a container. If you wear ___contact lenses or _X__glasses, they will be removed; please bring a case for them. 18. To provide excellent care visitors will be limited to one in the room at any given time. 19. Please bring picture ID and insurance card. 20. Sleep apnea patients need to bring CPAP to hospital on day of surgery. 21. Visit our web site for additional information: [email or web address]    22. During flu season no children under the age of 15 are permitted in the hospital for the safety of all patients. 23. Other Come in through main lobby, go to information desk. Please call pre admission testing if you have any further questions.    [address] [facility]     [address]

## 2021-08-08 ENCOUNTER — ANESTHESIA EVENT (OUTPATIENT)
Dept: ENDOSCOPY | Age: 71
End: 2021-08-08
Payer: MEDICARE

## 2021-08-08 NOTE — ANESTHESIA PRE PROCEDURE
Department of Anesthesiology  Preprocedure Note       Name:  Valery Arteaga   Age:  70 y.o.  :  1950                                          MRN:  [id number]         Date:  2021      Surgeon: Amena James):  Sb Hall MD    Procedure: Procedure(s):  EGD ESOPHAGOGASTRODUODENOSCOPY ULTRASOUND    Medications prior to admission:   Prior to Admission medications    Medication Sig Start Date End Date Taking? Authorizing Provider   lipase-protease-amylase (CREON) 52421-160584 units CPEP delayed release capsule Take 2 capsules by mouth 3 times daily (before meals) 21  Yes Virgilio Lopez III, MD   dicyclomine (BENTYL) 10 MG capsule Take 1 capsule by mouth 3 times daily (before meals) 21  Yes Erick Mention, DO   omeprazole (PRILOSEC) 40 MG delayed release capsule Take 1 capsule by mouth every morning (before breakfast)  Patient taking differently: Take 40 mg by mouth 2 times daily  21  Yes Debo Abarca, DO   amLODIPine (NORVASC) 5 MG tablet Take 1 tablet by mouth daily 21  Yes Erick Mention, DO   lisinopril-hydroCHLOROthiazide (PRINZIDE;ZESTORETIC) 20-12.5 MG per tablet take 1 tablet by mouth every morning 21  Yes Erick Mention, DO   Cholecalciferol (VITAMIN D3) 50 MCG (2000 UT) CAPS Take by mouth   Yes Historical Provider, MD   Zinc Sulfate (ZINC 15 PO) Take by mouth   Yes Historical Provider, MD   Multiple Vitamins-Minerals (THERAPEUTIC MULTIVITAMIN-MINERALS) tablet Take 1 tablet by mouth daily   Yes Historical Provider, MD   aspirin 81 MG EC tablet Take 81 mg by mouth daily   Yes Historical Provider, MD       Current medications:    No current facility-administered medications for this encounter.      Current Outpatient Medications   Medication Sig Dispense Refill    lipase-protease-amylase (CREON) 67302-673805 units CPEP delayed release capsule Take 2 capsules by mouth 3 times daily (before meals) 250 capsule 5    dicyclomine (BENTYL) 10 MG capsule Take 1 capsule by mouth 3 times daily (before meals) 90 capsule 5    omeprazole (PRILOSEC) 40 MG delayed release capsule Take 1 capsule by mouth every morning (before breakfast) (Patient taking differently: Take 40 mg by mouth 2 times daily ) 90 capsule 1    amLODIPine (NORVASC) 5 MG tablet Take 1 tablet by mouth daily 90 tablet 1    lisinopril-hydroCHLOROthiazide (PRINZIDE;ZESTORETIC) 20-12.5 MG per tablet take 1 tablet by mouth every morning 90 tablet 1    Cholecalciferol (VITAMIN D3) 50 MCG (2000 UT) CAPS Take by mouth      Zinc Sulfate (ZINC 15 PO) Take by mouth      Multiple Vitamins-Minerals (THERAPEUTIC MULTIVITAMIN-MINERALS) tablet Take 1 tablet by mouth daily      aspirin 81 MG EC tablet Take 81 mg by mouth daily         Allergies:     Allergies   Allergen Reactions    Covid-19 (Mrna) Vaccine Swelling     Throat swelling    Penicillins        Problem List:    Patient Active Problem List   Diagnosis Code    GERD without esophagitis K21.9    Benign essential hypertension I10    Hyperlipidemia E78.5       Past Medical History:        Diagnosis Date    Colon polyps     DJD (degenerative joint disease)     GERD (gastroesophageal reflux disease)     peptic reflux disease    Headache     Hyperlipidemia     Hypertension     Osteoarthritis        Past Surgical History:        Procedure Laterality Date    COLONOSCOPY      ENDOSCOPY, COLON, DIAGNOSTIC      KNEE CARTILAGE SURGERY         Social History:    Social History     Tobacco Use    Smoking status: Never Smoker    Smokeless tobacco: Never Used   Substance Use Topics    Alcohol use: Yes     Comment: 3/4 times a week                                Counseling given: Not Answered      Vital Signs (Current):   Vitals:    08/06/21 1638   Weight: 188 lb (85.3 kg)   Height: 5' 11\" (1.803 m)                                              BP Readings from Last 3 Encounters:   07/23/21 (!) 130/94   07/01/21 134/82   06/18/21 134/80       NPO Status: BMI:   Wt Readings from Last 3 Encounters:   07/23/21 187 lb 14.4 oz (85.2 kg)   07/01/21 194 lb (88 kg)   06/18/21 208 lb (94.3 kg)     Body mass index is 26.22 kg/m². CBC:   Lab Results   Component Value Date    WBC 7.6 06/02/2021    RBC 4.58 06/02/2021    HGB 14.8 06/02/2021    HCT 44.7 06/02/2021    MCV 97.6 06/02/2021    RDW 13.5 06/02/2021     06/02/2021       CMP:   Lab Results   Component Value Date     06/02/2021    K 4.0 06/02/2021     06/02/2021    CO2 25 06/02/2021    BUN 13 06/02/2021    CREATININE 1.0 06/02/2021    CREATININE 1.1 04/12/2019    GFRAA >60 06/02/2021    LABGLOM >60 06/02/2021    GLUCOSE 133 06/02/2021    PROT 7.3 06/02/2021    CALCIUM 9.6 06/02/2021    BILITOT 0.8 06/02/2021    ALKPHOS 59 06/02/2021    AST 31 06/02/2021    ALT 31 06/02/2021       POC Tests: No results for input(s): POCGLU, POCNA, POCK, POCCL, POCBUN, POCHEMO, POCHCT in the last 72 hours. Coags: No results found for: PROTIME, INR, APTT    HCG (If Applicable): No results found for: PREGTESTUR, PREGSERUM, HCG, HCGQUANT     ABGs: No results found for: PHART, PO2ART, YHI5QVA, EWQ1XEJ, BEART, M6JCRNJK     Type & Screen (If Applicable):  No results found for: LABABO, LABRH    Drug/Infectious Status (If Applicable):  No results found for: HIV, HEPCAB    COVID-19 Screening (If Applicable):   Lab Results   Component Value Date    COVID19 Not Detected 08/04/2021           Anesthesia Evaluation  Patient summary reviewed  Airway: Mallampati: III  TM distance: >3 FB   Neck ROM: full  Mouth opening: > = 3 FB Dental:      Comment: Dentition intact,missing multiple lower molars, patient denies any loose teeth.     Pulmonary:Negative Pulmonary ROS and normal exam  breath sounds clear to auscultation                             Cardiovascular:    (+) hypertension:, hyperlipidemia        Rhythm: regular  Rate: normal                    Neuro/Psych: (+) headaches:,             GI/Hepatic/Renal:   (+) GERD:,           Endo/Other:    (+) : arthritis: OA., .                 Abdominal:             Vascular: negative vascular ROS. Other Findings:           Anesthesia Plan      MAC     ASA 3       Induction: intravenous. Anesthetic plan and risks discussed with patient. Plan discussed with CRNA.                   Marilou Pillai MD   8/8/2021

## 2021-08-09 ENCOUNTER — HOSPITAL ENCOUNTER (OUTPATIENT)
Age: 71
Setting detail: OUTPATIENT SURGERY
Discharge: HOME | End: 2021-08-09
Attending: SURGERY | Admitting: SURGERY
Payer: MEDICARE

## 2021-08-09 ENCOUNTER — ANESTHESIA (OUTPATIENT)
Dept: ENDOSCOPY | Age: 71
End: 2021-08-09
Payer: MEDICARE

## 2021-08-09 VITALS
BODY MASS INDEX: 25.06 KG/M2 | RESPIRATION RATE: 16 BRPM | TEMPERATURE: 98.2 F | OXYGEN SATURATION: 98 % | DIASTOLIC BLOOD PRESSURE: 73 MMHG | SYSTOLIC BLOOD PRESSURE: 131 MMHG | WEIGHT: 179 LBS | HEIGHT: 71 IN | HEART RATE: 89 BPM

## 2021-08-09 VITALS
RESPIRATION RATE: 19 BRPM | DIASTOLIC BLOOD PRESSURE: 89 MMHG | OXYGEN SATURATION: 99 % | SYSTOLIC BLOOD PRESSURE: 130 MMHG

## 2021-08-09 DIAGNOSIS — Z01.818 PREOP TESTING: Primary | ICD-10-CM

## 2021-08-09 PROCEDURE — 7100000010 HC PHASE II RECOVERY - FIRST 15 MIN: Performed by: SURGERY

## 2021-08-09 PROCEDURE — 3700000001 HC ADD 15 MINUTES (ANESTHESIA): Performed by: SURGERY

## 2021-08-09 PROCEDURE — 99213 OFFICE O/P EST LOW 20 MIN: CPT | Performed by: SURGERY

## 2021-08-09 PROCEDURE — 3700000000 HC ANESTHESIA ATTENDED CARE: Performed by: SURGERY

## 2021-08-09 PROCEDURE — 2580000003 HC RX 258: Performed by: ANESTHESIOLOGY

## 2021-08-09 PROCEDURE — 6360000002 HC RX W HCPCS: Performed by: NURSE ANESTHETIST, CERTIFIED REGISTERED

## 2021-08-09 PROCEDURE — 2709999900 HC NON-CHARGEABLE SUPPLY: Performed by: SURGERY

## 2021-08-09 PROCEDURE — 3609018500 HC EGD US SCOPE W/ADJACENT STRUCTURES: Performed by: SURGERY

## 2021-08-09 PROCEDURE — 7100000011 HC PHASE II RECOVERY - ADDTL 15 MIN: Performed by: SURGERY

## 2021-08-09 RX ORDER — SODIUM CHLORIDE 0.9 % (FLUSH) 0.9 %
5-40 SYRINGE (ML) INJECTION EVERY 12 HOURS SCHEDULED
Status: DISCONTINUED | OUTPATIENT
Start: 2021-08-09 | End: 2021-08-09 | Stop reason: HOSPADM

## 2021-08-09 RX ORDER — SODIUM CHLORIDE 0.9 % (FLUSH) 0.9 %
5-40 SYRINGE (ML) INJECTION PRN
Status: DISCONTINUED | OUTPATIENT
Start: 2021-08-09 | End: 2021-08-09 | Stop reason: HOSPADM

## 2021-08-09 RX ORDER — SODIUM CHLORIDE 9 MG/ML
25 INJECTION, SOLUTION INTRAVENOUS PRN
Status: DISCONTINUED | OUTPATIENT
Start: 2021-08-09 | End: 2021-08-09 | Stop reason: HOSPADM

## 2021-08-09 RX ORDER — PROPOFOL 10 MG/ML
INJECTION, EMULSION INTRAVENOUS CONTINUOUS PRN
Status: DISCONTINUED | OUTPATIENT
Start: 2021-08-09 | End: 2021-08-09 | Stop reason: SDUPTHER

## 2021-08-09 RX ORDER — SODIUM CHLORIDE 9 MG/ML
INJECTION, SOLUTION INTRAVENOUS CONTINUOUS
Status: DISCONTINUED | OUTPATIENT
Start: 2021-08-09 | End: 2021-08-09 | Stop reason: HOSPADM

## 2021-08-09 RX ADMIN — SODIUM CHLORIDE: 9 INJECTION, SOLUTION INTRAVENOUS at 07:20

## 2021-08-09 RX ADMIN — PROPOFOL 150 MCG/KG/MIN: 10 INJECTION, EMULSION INTRAVENOUS at 07:42

## 2021-08-09 ASSESSMENT — PAIN SCALES - GENERAL
PAINLEVEL_OUTOF10: 0
PAINLEVEL_OUTOF10: 0

## 2021-08-09 ASSESSMENT — PAIN DESCRIPTION - DESCRIPTORS: DESCRIPTORS: DISCOMFORT

## 2021-08-09 ASSESSMENT — PAIN - FUNCTIONAL ASSESSMENT
PAIN_FUNCTIONAL_ASSESSMENT: PREVENTS OR INTERFERES SOME ACTIVE ACTIVITIES AND ADLS
PAIN_FUNCTIONAL_ASSESSMENT: 0-10

## 2021-08-09 NOTE — H&P
General Surgery History and Physical  [facility]    Patient's Name/Date of Birth: Bahman Graf / 1950    Date: August 9, 2021     Surgeon: Adama Nova MD    PCP: Sergio Nava DO     Chief Complaint: pancreatic cyst    HPI:   Bahman Graf is a 70 y.o. male who presents for evaluation of pancreatic cyst. He had COVID in November and had some abdominal pain. His epigastric abdominal pain has been off and on since then. He states that his pain started in his epigastrium and right upper quadrant and wraps around to his back. He has a lack of appetite and is lethargic. He states that he has worsening reflux and nausea. When the pain happens he doesn't eat and sticks to a liquid diet and then he feels better. He states that when he eats a fatty meal it seems to trigger his attack. He has never had pancreatitis. He has lost 20 lbs in the last 2 months. He does drink 1 alcoholic beverage nightly but denies binge or heavy drinking. Patient Active Problem List   Diagnosis    GERD without esophagitis    Benign essential hypertension    Hyperlipidemia       Past Medical History:   Diagnosis Date    Colon polyps     DJD (degenerative joint disease)     GERD (gastroesophageal reflux disease)     peptic reflux disease    Headache     Hyperlipidemia     Hypertension     Osteoarthritis        Past Surgical History:   Procedure Laterality Date    COLONOSCOPY      ENDOSCOPY, COLON, DIAGNOSTIC      KNEE CARTILAGE SURGERY         Allergies   Allergen Reactions    Covid-19 (Mrna) Vaccine Swelling     Throat swelling    Penicillins        The patient has a family history that is negative for severe cardiovascular or respiratory issues, negative for reaction to anesthesia. Time spent reviewing past medical, surgical, social and family history, vitals, nursing assessment and images. No changes from above documented history.     Social History     Socioeconomic History    Marital status:      Spouse name: Not on file    Number of children: Not on file    Years of education: Not on file    Highest education level: Not on file   Occupational History    Not on file   Tobacco Use    Smoking status: Never Smoker    Smokeless tobacco: Never Used   Vaping Use    Vaping Use: Never used   Substance and Sexual Activity    Alcohol use: Yes     Comment: 3/4 times a week    Drug use: Never    Sexual activity: Not on file   Other Topics Concern    Not on file   Social History Narrative    Not on file     Social Determinants of Health     Financial Resource Strain:     Difficulty of Paying Living Expenses:    Food Insecurity:     Worried About Running Out of Food in the Last Year:     [address] in the Last Year:    Transportation Needs:     Lack of Transportation (Medical):  Lack of Transportation (Non-Medical):    Physical Activity:     Days of Exercise per Week:     Minutes of Exercise per Session:    Stress:     Feeling of Stress :    Social Connections:     Frequency of Communication with Friends and Family:     Frequency of Social Gatherings with Friends and Family:     Attends Religion Services:     Active Member of Clubs or Organizations:     Attends Club or Organization Meetings:     Marital Status:    Intimate Partner Violence:     Fear of Current or Ex-Partner:     Emotionally Abused:     Physically Abused:     Sexually Abused:        I have reviewed relevant labs from this admission and interpretation is included in my assessment and plan    Review of Systems    A complete 10 system review was performed and are otherwise negative unless mentioned in the above HPI. Specific negatives are listed below but may not include all those reviewed.     General ROS: negative obtundation, AMS  ENT ROS: negative rhinorrhea, epistaxis  Allergy and Immunology ROS: negative itchy/watery eyes or nasal congestion  Hematological and Lymphatic ROS: negative spontaneous bleeding or bruising  Endocrine ROS: negative  lethargy, mood swings, palpitations or polydipsia/polyuria  Respiratory ROS: negative sputum changes, stridor, tachypnea or wheezing  Cardiovascular ROS: negative for - loss of consciousness, murmur or orthopnea  Gastrointestinal ROS: negative for - hematochezia or hematemesis  Genito-Urinary ROS: negative for -  genital discharge or hematuria  Musculoskeletal ROS: negative for - focal weakness, gangrene  Psych/Neuro ROS: negative for - visual or auditory hallucinations, suicidal ideation    Physical exam:   /83   Pulse 99   Temp 97.3 °F (36.3 °C) (Infrared)   Resp 18   Ht 5' 11\" (1.803 m)   Wt 179 lb (81.2 kg)   SpO2 98%   BMI 24.97 kg/m²   General appearance:  NAD, appears stated age  Head: NCAT, PERRLA, EOMI, red conjunctiva  Neck: supple, no masses, trachea midline  Lungs: Equal chest rise bilateral, no retractions, no wheezing  Heart: Reg rate  Abdomen: soft, nontender, nondistended  Skin; warm and dry, no cyanosis  Gu: no cva tenderness  Extremities: atraumatic, no focal motor deficits, no open wounds  Psych: No tremor, visual hallucinations      Radiology: I reviewed relevant abdominal imaging from this admission and that available in the EMR including CT abd/pel from 7/2/21. My assessment is acute pancreatitis with pancreatic head cyst and perihepatic fluid collection    Assessment:  Kristy Rivera is a 70 y.o. 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 he  agrees to proceed.         Adonis Crystal MD  7:40 AM  8/9/2021

## 2021-08-09 NOTE — ANESTHESIA POSTPROCEDURE EVALUATION
Department of Anesthesiology  Postprocedure Note    Patient: Keyona Dior  MRN: [id number]  YOB: 1950  Date of evaluation: 8/9/2021  Time:  8:25 AM     Procedure Summary     Date: 08/09/21 Room / Location: [address] [place] [address] / [address]    Anesthesia Start: 2131 Anesthesia Stop: 0801    Procedure: EGD ESOPHAGOGASTRODUODENOSCOPY ULTRASOUND (N/A ) Diagnosis: (PANCREATIC MASS)    Surgeons: Mirza Anderson MD Responsible Provider: Kat Gomez MD    Anesthesia Type: MAC ASA Status: 3          Anesthesia Type: MAC    Nicole Phase I: Nicole Score: 10    Nicole Phase II:      Last vitals: Reviewed and per EMR flowsheets.        Anesthesia Post Evaluation    Patient location during evaluation: bedside  Patient participation: complete - patient participated  Level of consciousness: awake  Pain score: 0  Airway patency: patent  Nausea & Vomiting: no nausea and no vomiting  Complications: no  Cardiovascular status: hemodynamically stable  Respiratory status: acceptable  Hydration status: euvolemic

## 2021-08-09 NOTE — OP NOTE
Endoscopic Ultrasound Procedure Note    Date of Procedure: 8/9/2021    Pre-procedure Diagnosis: Pancreatic cyst    Post-procedure Diagnosis: Pancreatic cyst    Physician: Chuyita Jiménez MD    Assistant: None    Estimated Blood Loss: None    Anesthesia: LMAC     Complications: None    Indications and History:  The patient is a 70 y.o. male. The risks, benefits, complications, treatment options and expected outcomes were discussed with the patient. The possibilities of reaction to medication, pulmonary aspiration, perforation of the gastrointestinal tract, bleeding requiring transfusion or operation, respiratory failure requiring placement on a ventilator and failure to diagnose a condition were discussed with the patient who freely signed the consent. Description of Procedure: The patient was taken to the endoscopy suite, identified as Abdirizak Rascon and the procedure verified as Endoscopic Ultrasound (EUS). A Time Out was held and the above information confirmed. The patient was positioned in the left lateral position with an oral bite block and anesthesia was provided for sedation and comfort. The endoscope was passed through the mouth to the second portion of the duodenum. Following the routine endoscopy, the echoendoscope was passed to the duodenal bulb. EGD/EUS findings:   Esophagus: normal   Stomach: normal   Duodenum: normal   Pancreas: 2.8 cm cystic mass at the pancreatic head with persistent pancreatic and peripancreatic inflammation consistent with pancreatitis and pancreatic pseudocyst.  The pancreatic duct is mildly dilated to 2.5 mm throughout the pancreas. Another cystic focus adjacent to the left hepatic lobe similar in size to CT scan 1 month ago. No evidence of pancreatic duct stones or other solid masses in the pancreas. Liver: Fatty replacement of the liver   Bile Duct: normal   Gallbladder: Sludge-filled       Specimens:  1.  None    The Patient was taken to the Endoscopy

## 2021-08-19 ENCOUNTER — OFFICE VISIT (OUTPATIENT)
Dept: HEMATOLOGY | Age: 71
End: 2021-08-19
Payer: MEDICARE

## 2021-08-19 VITALS
WEIGHT: 178 LBS | OXYGEN SATURATION: 99 % | HEART RATE: 90 BPM | DIASTOLIC BLOOD PRESSURE: 72 MMHG | SYSTOLIC BLOOD PRESSURE: 115 MMHG | HEIGHT: 71 IN | TEMPERATURE: 97.9 F | RESPIRATION RATE: 18 BRPM | BODY MASS INDEX: 24.92 KG/M2

## 2021-08-19 DIAGNOSIS — K85.80 OTHER ACUTE PANCREATITIS WITHOUT INFECTION OR NECROSIS: ICD-10-CM

## 2021-08-19 DIAGNOSIS — K86.3 PANCREATIC PSEUDOCYST: ICD-10-CM

## 2021-08-19 DIAGNOSIS — K86.2 PANCREATIC CYST: Primary | ICD-10-CM

## 2021-08-19 PROCEDURE — 99213 OFFICE O/P EST LOW 20 MIN: CPT | Performed by: TRANSPLANT SURGERY

## 2021-08-19 PROCEDURE — 99212 OFFICE O/P EST SF 10 MIN: CPT | Performed by: TRANSPLANT SURGERY

## 2021-08-19 ASSESSMENT — ENCOUNTER SYMPTOMS
CONSTIPATION: 0
BACK PAIN: 0
BLOOD IN STOOL: 0
EYE DISCHARGE: 0
SHORTNESS OF BREATH: 0
DIARRHEA: 0
ABDOMINAL PAIN: 1
PHOTOPHOBIA: 0
VOMITING: 0
NAUSEA: 0
EYE PAIN: 0

## 2021-08-19 NOTE — PROGRESS NOTES
Hepatobiliary and Pancreatic Surgery Attending History and Physical    Patient's Name/Date of Birth: Ashely Story /1950 (88 y.o.)    Date: August 19, 2021     CC:Pancreatic cyst    HPI:  Patient is a very pleasant 70year old male whom states that he had [place] in November and had some abdominal pain. His epigastric abdominal pain has been off and on since then. He states that his pain started in his epigastrium and right upper quadrant and wraps around to his back. He has a lack of appetite and is lethargic. He has lost weight. He states that he has worsening reflux and nausea. He saw Dr. Velvet Reyez and had an [address] then a CT. She denies any diarrhea. He states that for the past 3-4 weeks he is taking bentyl. When the pain happens he doesn't eat and sticks to a liquid diet and then he feels better. He states that when he eats a fatty meal it seems to trigger his attack. He has never had pancreatitis. He has lost 20 lbs. He does drink 1 alcoholic beverage nightly with a night cap but when he has pain he does not drink. He denies any smoking. And currently he denies any pain    Interim:  His EUS showed 2.8 cm cystic mass at the pancreatic head with persistent pancreatic and peripancreatic inflammation consistent with pancreatitis and pancreatic pseudocyst.  The pancreatic duct is mildly dilated to 2.5 mm throughout the pancreas. HE had another cystic focus adjacent to the left hepatic lobe similar in size to CT scan 1 month ago. Only twice since our last visit he has gotten sick for about 36 hours. It was the same pain as before. He is still losing weight but is sticking 20g of fat a day. He is not taking creon as he is waiting for financial approval.  His last time he had dressing on a salad and then had pain after.       Family history: melanoma in 1980    Past Medical History:   Diagnosis Date    Colon polyps     DJD (degenerative joint disease)     GERD (gastroesophageal reflux disease) peptic reflux disease    Headache     Hyperlipidemia     Hypertension     Osteoarthritis        PSH: mealnoma excision 1980     Current Outpatient Medications   Medication Sig Dispense Refill    lipase-protease-amylase (CREON) 82934-579642 units CPEP delayed release capsule Take 2 capsules by mouth 3 times daily (before meals) 250 capsule 5    dicyclomine (BENTYL) 10 MG capsule Take 1 capsule by mouth 3 times daily (before meals) 90 capsule 5    omeprazole (PRILOSEC) 40 MG delayed release capsule Take 1 capsule by mouth every morning (before breakfast) (Patient taking differently: Take 40 mg by mouth 2 times daily ) 90 capsule 1    amLODIPine (NORVASC) 5 MG tablet Take 1 tablet by mouth daily 90 tablet 1    lisinopril-hydroCHLOROthiazide (PRINZIDE;ZESTORETIC) 20-12.5 MG per tablet take 1 tablet by mouth every morning 90 tablet 1    Cholecalciferol (VITAMIN D3) 50 MCG (2000 UT) CAPS Take by mouth      Zinc Sulfate (ZINC 15 PO) Take by mouth      Multiple Vitamins-Minerals (THERAPEUTIC MULTIVITAMIN-MINERALS) tablet Take 1 tablet by mouth daily      aspirin 81 MG EC tablet Take 81 mg by mouth daily       No current facility-administered medications for this visit.        Allergies   Allergen Reactions    Covid-19 (Mrna) Vaccine Swelling     Throat swelling    Penicillins        Social History     Socioeconomic History    Marital status:      Spouse name: Not on file    Number of children: Not on file    Years of education: Not on file    Highest education level: Not on file   Occupational History    Not on file   Tobacco Use    Smoking status: Never Smoker    Smokeless tobacco: Never Used   Vaping Use    Vaping Use: Never used   Substance and Sexual Activity    Alcohol use: Yes     Comment: 3/4 times a week    Drug use: Never    Sexual activity: Not on file   Other Topics Concern    Not on file   Social History Narrative    Not on file     Social Determinants of Health     Financial Resource Strain:     Difficulty of Paying Living Expenses:    Food Insecurity:     Worried About Running Out of Food in the Last Year:     [address] in the Last Year:    Transportation Needs:     Lack of Transportation (Medical):  Lack of Transportation (Non-Medical):    Physical Activity:     Days of Exercise per Week:     Minutes of Exercise per Session:    Stress:     Feeling of Stress :    Social Connections:     Frequency of Communication with Friends and Family:     Frequency of Social Gatherings with Friends and Family:     Attends Restorationist Services:     Active Member of Clubs or Organizations:     Attends Club or Organization Meetings:     Marital Status:    Intimate Partner Violence:     Fear of Current or Ex-Partner:     Emotionally Abused:     Physically Abused:     Sexually Abused:        ROS:   Review of Systems   Constitutional: Positive for activity change, appetite change and unexpected weight change. Negative for chills, diaphoresis and fever. HENT: Negative for congestion, ear discharge, ear pain, hearing loss, nosebleeds and tinnitus. Eyes: Negative for photophobia, pain and discharge. Respiratory: Negative for shortness of breath. Cardiovascular: Negative for palpitations and leg swelling. Gastrointestinal: Positive for abdominal pain. Negative for blood in stool, constipation, diarrhea, nausea and vomiting. Endocrine: Negative for polydipsia. Genitourinary: Negative for frequency, hematuria and urgency. Musculoskeletal: Negative for back pain and neck pain. Skin: Negative for rash. Allergic/Immunologic: Negative for environmental allergies. Neurological: Negative for tremors and seizures. Psychiatric/Behavioral: Negative for hallucinations and suicidal ideas. The patient is not nervous/anxious.         Physical Exam:  /72 (Site: Right Upper Arm, Position: Sitting, Cuff Size: Medium Adult)   Pulse 90   Temp 97.9 °F (36.6 °C) (Temporal)

## 2021-09-01 ENCOUNTER — TELEPHONE (OUTPATIENT)
Dept: PRIMARY CARE CLINIC | Age: 71
End: 2021-09-01

## 2021-09-01 ENCOUNTER — TELEPHONE (OUTPATIENT)
Dept: HEMATOLOGY | Age: 71
End: 2021-09-01

## 2021-09-01 DIAGNOSIS — K86.2 PANCREATIC CYST: Primary | ICD-10-CM

## 2021-09-01 DIAGNOSIS — K85.80 OTHER ACUTE PANCREATITIS WITHOUT INFECTION OR NECROSIS: ICD-10-CM

## 2021-09-01 DIAGNOSIS — K86.3 PANCREATIC PSEUDOCYST: ICD-10-CM

## 2021-09-01 NOTE — TELEPHONE ENCOUNTER
I called [facility] and spoke to Stefanie Hamm and no Graham Yolette was needed for cpt code 76107 with [facility]#[id number]. I received call from Lizzy Fritz 1615 from [facility] and after clinical review they will not approve CT pelvis but did approve the CT abdomen with auth #[id number] with approval dates 8/23/21-10/21/21. I called and schedule patient for his CT on 9/2/21 at [facility] arrival time 2:00pm.  I called patient and I gave him the CT appt information and he was instructed to remain NPO 4 hours prior to CT and to go get blood work done today. I gave him directions to radiology registration. I also called and scheduled patient for his surgery on 9/8/21 at [facility] at 7:30am.  Patient was given this surgery date, time and location and was told that PAT will call in the next few days with further instructions. He verbalized understanding and confirmed these appts.     Electronically signed by Phong Mohamud RN on 9/1/2021 at 1:51 PM

## 2021-09-01 NOTE — TELEPHONE ENCOUNTER
----- Message from James [place] sent at 9/1/2021  2:32 PM EDT -----  Subject: Appointment Request    Reason for Call: Routine Pre-Op    QUESTIONS  Type of Appointment? Established Patient  Reason for appointment request? Available appointments did not meet   patient need  Additional Information for Provider? Patient is trying to schedule pre-op   appt for a surgery on 9/8 at [address] in [address] [place] [address] to have his gallbladder   removed. States he needs an EKG. No available appts prior to surgery. Please call patient back to schedule.   ---------------------------------------------------------------------------  --------------  CALL BACK INFO  What is the best way for the office to contact you? OK to leave message on   voicemail  Preferred Call Back Phone Number? [phone]  ---------------------------------------------------------------------------  --------------  SCRIPT ANSWERS  Relationship to Patient? Self  Do you have questions for your provider that need to be answered prior to   scheduling your pre-op appointment? No  Have you been diagnosed with, awaiting test results for, or told that you   are suspected of having COVID-19 (Coronavirus)? (If patient has tested   negative or was tested as a requirement for work, school, or travel and   not based on symptoms, answer no)? No  Do you currently have flu-like symptoms including fever or chills, cough,   shortness of breath, difficulty breathing, or new loss of taste or smell? No  Have you had close contact with someone with COVID-19 in the last 14 days? No  (Service Expert  click yes below to proceed with [facility]   Scheduling)?  Yes

## 2021-09-02 ENCOUNTER — OFFICE VISIT (OUTPATIENT)
Dept: PRIMARY CARE CLINIC | Age: 71
End: 2021-09-02
Payer: MEDICARE

## 2021-09-02 ENCOUNTER — HOSPITAL ENCOUNTER (OUTPATIENT)
Dept: CT IMAGING | Age: 71
Discharge: HOME | End: 2021-09-04
Payer: MEDICARE

## 2021-09-02 VITALS
SYSTOLIC BLOOD PRESSURE: 118 MMHG | DIASTOLIC BLOOD PRESSURE: 70 MMHG | HEART RATE: 67 BPM | HEIGHT: 71 IN | BODY MASS INDEX: 24.78 KG/M2 | WEIGHT: 177 LBS | OXYGEN SATURATION: 97 % | TEMPERATURE: 96.7 F

## 2021-09-02 DIAGNOSIS — K82.8 GALLBLADDER SLUDGE: Primary | ICD-10-CM

## 2021-09-02 DIAGNOSIS — K86.2 PANCREATIC CYST: ICD-10-CM

## 2021-09-02 DIAGNOSIS — Z01.818 PRE-OP EXAM: ICD-10-CM

## 2021-09-02 DIAGNOSIS — K86.3 PANCREATIC PSEUDOCYST: ICD-10-CM

## 2021-09-02 DIAGNOSIS — K85.80 OTHER ACUTE PANCREATITIS WITHOUT INFECTION OR NECROSIS: ICD-10-CM

## 2021-09-02 DIAGNOSIS — I10 BENIGN ESSENTIAL HYPERTENSION: ICD-10-CM

## 2021-09-02 PROCEDURE — 74160 CT ABDOMEN W/CONTRAST: CPT

## 2021-09-02 PROCEDURE — 99214 OFFICE O/P EST MOD 30 MIN: CPT | Performed by: FAMILY MEDICINE

## 2021-09-02 PROCEDURE — 6360000004 HC RX CONTRAST MEDICATION: Performed by: RADIOLOGY

## 2021-09-02 PROCEDURE — 2580000003 HC RX 258: Performed by: RADIOLOGY

## 2021-09-02 PROCEDURE — 93000 ELECTROCARDIOGRAM COMPLETE: CPT | Performed by: FAMILY MEDICINE

## 2021-09-02 RX ORDER — SODIUM CHLORIDE 0.9 % (FLUSH) 0.9 %
10 SYRINGE (ML) INJECTION
Status: COMPLETED | OUTPATIENT
Start: 2021-09-02 | End: 2021-09-02

## 2021-09-02 RX ADMIN — Medication 10 ML: at 13:40

## 2021-09-02 RX ADMIN — IOPAMIDOL 90 ML: 755 INJECTION, SOLUTION INTRAVENOUS at 13:40

## 2021-09-02 ASSESSMENT — ENCOUNTER SYMPTOMS
SHORTNESS OF BREATH: 0
COUGH: 0
CONSTIPATION: 0
ABDOMINAL PAIN: 1
VOMITING: 0
DIARRHEA: 0
NAUSEA: 0
BACK PAIN: 0
WHEEZING: 0

## 2021-09-02 NOTE — PROGRESS NOTES
21  Mechelle Huerta : 1950 Sex: male  Age: 70 y.o. Chief Complaint   Patient presents with    Pre-op Exam     gallbladder 21     HPI:  70 y.o. male presents today for pre-op exam.  Patient's chart, medical, surgical and medication history all reviewed. Pre-Operative Risk assessment using 2014 ACC/AHA guidelines   Procedure: Lap luiz   Date of procedure:  21  Surgeon: Dr. Grayson Thurman  Emergent procedure No  Active Cardiac Condition No (decompensated HF, Arrhythmia, MI <3 weeks, severe valve disease)  Risk Level of Procedure Intermediate Risk (intraperitoneal, intrathoracic, HENT, orthopedic, or carotid endarterectomy, etc.)  Revised Cardiac Risk Index Risk factors: None  Measurement of Exercise Tolerance before Surgery >4 Yes    According to the 2014 ACC/AHA pre-operative risk assessment guidelines Mechelle Huerta is a low risk for major cardiac complications during a intermediate risk procedure and may continue as planned. Specific medication recommendations are listed below. Medications recommended to continue should be taken with a sip of water even when NPO. According to Saira Hoit perioperative risk calculation, there is a 0.14% risk of myocardial infarction or cardiac arrest, intraoperatively or up to 30 days post-op. Further recommendations from consultants: None    Medication Recommendations:  ACEI/ARB Continue the day of surgery  Calcium Channel Blocker should be continued the day of surgery   Diuretics should be continued the morning dose on the day of surgery      ROS:  Review of Systems   Constitutional: Positive for unexpected weight change (loss). Negative for chills, fatigue and fever. Respiratory: Negative for cough, shortness of breath and wheezing. Cardiovascular: Negative for chest pain and palpitations. Gastrointestinal: Positive for abdominal pain. Negative for constipation, diarrhea, nausea and vomiting.    Musculoskeletal: Negative for arthralgias and back pain. Skin: Negative for rash. Neurological: Negative for dizziness and headaches. Psychiatric/Behavioral: Negative for dysphoric mood. The patient is not nervous/anxious. All other systems reviewed and are negative. Current Outpatient Medications on File Prior to Visit   Medication Sig Dispense Refill    lipase-protease-amylase (CREON) 01062-175177 units CPEP delayed release capsule Take 2 capsules by mouth 3 times daily (before meals) 250 capsule 5    omeprazole (PRILOSEC) 40 MG delayed release capsule Take 1 capsule by mouth every morning (before breakfast) (Patient taking differently: Take 40 mg by mouth 2 times daily ) 90 capsule 1    amLODIPine (NORVASC) 5 MG tablet Take 1 tablet by mouth daily 90 tablet 1    lisinopril-hydroCHLOROthiazide (PRINZIDE;ZESTORETIC) 20-12.5 MG per tablet take 1 tablet by mouth every morning 90 tablet 1    Cholecalciferol (VITAMIN D3) 50 MCG (2000 UT) CAPS Take by mouth      Zinc Sulfate (ZINC 15 PO) Take by mouth      Multiple Vitamins-Minerals (THERAPEUTIC MULTIVITAMIN-MINERALS) tablet Take 1 tablet by mouth daily      aspirin 81 MG EC tablet Take 81 mg by mouth daily       No current facility-administered medications on file prior to visit. Allergies   Allergen Reactions    Covid-19 (Mrna) Vaccine Swelling     Throat swelling    Penicillins        Past Medical History:   Diagnosis Date    Colon polyps     DJD (degenerative joint disease)     GERD (gastroesophageal reflux disease)     peptic reflux disease    Headache     Hyperlipidemia     Hypertension     Osteoarthritis      Past Surgical History:   Procedure Laterality Date    COLONOSCOPY      ENDOSCOPY, COLON, DIAGNOSTIC      KNEE CARTILAGE SURGERY      UPPER GASTROINTESTINAL ENDOSCOPY N/A 8/9/2021    EGD ESOPHAGOGASTRODUODENOSCOPY ULTRASOUND performed by Clementina Courtney MD at [place] [id number] reviewed. No pertinent family history.   Social History     Socioeconomic History    Marital status:      Spouse name: Not on file    Number of children: Not on file    Years of education: Not on file    Highest education level: Not on file   Occupational History    Not on file   Tobacco Use    Smoking status: Never Smoker    Smokeless tobacco: Never Used   Vaping Use    Vaping Use: Never used   Substance and Sexual Activity    Alcohol use: Yes     Comment: 3/4 times a week    Drug use: Never    Sexual activity: Not on file   Other Topics Concern    Not on file   Social History Narrative    Not on file     Social Determinants of Health     Financial Resource Strain:     Difficulty of Paying Living Expenses:    Food Insecurity:     Worried About [email or web address] in the Last Year:     [address] [place] in the Last Year:    Transportation Needs:     Lack of Transportation (Medical):  Lack of Transportation (Non-Medical):    Physical Activity:     Days of Exercise per Week:     Minutes of Exercise per Session:    Stress:     Feeling of Stress :    Social Connections:     Frequency of Communication with Friends and Family:     Frequency of Social Gatherings with Friends and Family:     Attends Druze Services:     Active Member of Clubs or Organizations:     Attends Club or Organization Meetings:     Marital Status:    Intimate Partner Violence:     Fear of Current or Ex-Partner:     Emotionally Abused:     Physically Abused:     Sexually Abused:        Vitals:    09/02/21 0920   BP: 118/70   Pulse: 67   Temp: 96.7 °F (35.9 °C)   SpO2: 97%   Weight: 177 lb (80.3 kg)   Height: 5' 11\" (1.803 m)       Physical Exam:  Physical Exam  Vitals and nursing note reviewed. Constitutional:       General: He is not in acute distress. Appearance: Normal appearance. He is well-developed and normal weight. He is not ill-appearing. HENT:      Head: Normocephalic and atraumatic.       Right Ear: Hearing and external ear normal.      Left Ear: Hearing and 09/01/2021     09/01/2021    CO2 29 09/01/2021    BUN 12 09/01/2021    CREATININE 1.0 09/01/2021    CREATININE 1.1 04/12/2019    GFRAA >60 09/01/2021    LABGLOM >60 09/01/2021    GLUCOSE 124 09/01/2021    PROT 7.3 06/02/2021    LABALBU 4.1 06/02/2021    CALCIUM 9.3 09/01/2021    BILITOT 0.8 06/02/2021    ALKPHOS 59 06/02/2021    AST 31 06/02/2021    ALT 31 06/02/2021     U/A:    Lab Results   Component Value Date    COLORU DARK YELLOW 06/02/2021    PROTEINU Negative 06/02/2021    PHUR 7.0 06/02/2021    CLARITYU Clear 06/02/2021    SPECGRAV 1.010 06/02/2021    LEUKOCYTESUR Negative 06/02/2021    UROBILINOGEN 1.0 06/02/2021    BILIRUBINUR Negative 06/02/2021    BLOODU Negative 06/02/2021    GLUCOSEU Negative 06/02/2021     HgBA1c:    Lab Results   Component Value Date    LABA1C 5.6 06/02/2021     FLP:    Lab Results   Component Value Date    TRIG 125 06/02/2021    HDL 29 06/02/2021    LDLCALC 101 06/02/2021    LABVLDL 25 06/02/2021     TSH:    Lab Results   Component Value Date    TSH 0.720 06/02/2021     PSA:   Lab Results   Component Value Date    PSA 0.78 06/01/2020        Assessment and Plan:  Nelly Camejo was seen today for pre-op exam.    Diagnoses and all orders for this visit:    Gallbladder sludge    Pre-op exam  -     EKG 12 Lead    Benign essential hypertension    EKG reviewed in detail. NSR with non-specific T wave abnormality. Medically optimized for surgery at this time. Continue BP medications AM of surgery. Return if symptoms worsen or fail to improve.       Seen By:  Mounika French DO

## 2021-09-03 ENCOUNTER — HOSPITAL ENCOUNTER (OUTPATIENT)
Age: 71
Discharge: HOME | End: 2021-09-05
Payer: MEDICARE

## 2021-09-03 DIAGNOSIS — U07.1 COVID-19: ICD-10-CM

## 2021-09-03 PROCEDURE — U0003 INFECTIOUS AGENT DETECTION BY NUCLEIC ACID (DNA OR RNA); SEVERE ACUTE RESPIRATORY SYNDROME CORONAVIRUS 2 (SARS-COV-2) (CORONAVIRUS DISEASE [COVID-19]), AMPLIFIED PROBE TECHNIQUE, MAKING USE OF HIGH THROUGHPUT TECHNOLOGIES AS DESCRIBED BY CMS-2020-01-R: HCPCS

## 2021-09-03 PROCEDURE — U0005 INFEC AGEN DETEC AMPLI PROBE: HCPCS

## 2021-09-04 LAB — SARS-COV-2, PCR: NOT DETECTED

## 2021-09-07 ENCOUNTER — ANESTHESIA EVENT (OUTPATIENT)
Dept: OPERATING ROOM | Age: 71
End: 2021-09-07
Payer: MEDICARE

## 2021-09-07 NOTE — H&P
Hepatobiliary and Pancreatic Surgery Attending History and Physical     Patient's Name/Date of Birth: Charlette Victoria /1950 (12 y.o.)     Date: August 19, 2021      CC:Pancreatic cyst     HPI:  Patient is a very pleasant 70year old male whom states that he had [place] in November and had some abdominal pain. His epigastric abdominal pain has been off and on since then. He states that his pain started in his epigastrium and right upper quadrant and wraps around to his back. He has a lack of appetite and is lethargic. He has lost weight. He states that he has worsening reflux and nausea. He saw Dr. Maria Esther Almanza and had an [address] then a CT. She denies any diarrhea. He states that for the past 3-4 weeks he is taking bentyl. When the pain happens he doesn't eat and sticks to a liquid diet and then he feels better. He states that when he eats a fatty meal it seems to trigger his attack. He has never had pancreatitis. He has lost 20 lbs. He does drink 1 alcoholic beverage nightly with a night cap but when he has pain he does not drink. He denies any smoking. And currently he denies any pain     Interim:  His EUS showed 2.8 cm cystic mass at the pancreatic head with persistent pancreatic and peripancreatic inflammation consistent with pancreatitis and pancreatic pseudocyst.  The pancreatic duct is mildly dilated to 2.5 mm throughout the pancreas. Holden Calderón had another cystic focus adjacent to the left hepatic lobe similar in size to CT scan 1 month ago. Only twice since our last visit he has gotten sick for about 36 hours. It was the same pain as before. He is still losing weight but is sticking 20g of fat a day.   He is not taking creon as he is waiting for financial approval.  His last time he had dressing on a salad and then had pain after.       Family history: melanoma in 1980     Past Medical History        Past Medical History:   Diagnosis Date    Colon polyps      DJD (degenerative joint disease)      GERD (gastroesophageal reflux disease)       peptic reflux disease    Headache      Hyperlipidemia      Hypertension      Osteoarthritis              PSH: mealnoma excision 1980      Current Facility-Administered Medications          Current Outpatient Medications   Medication Sig Dispense Refill    lipase-protease-amylase (CREON) 79706-187122 units CPEP delayed release capsule Take 2 capsules by mouth 3 times daily (before meals) 250 capsule 5    dicyclomine (BENTYL) 10 MG capsule Take 1 capsule by mouth 3 times daily (before meals) 90 capsule 5    omeprazole (PRILOSEC) 40 MG delayed release capsule Take 1 capsule by mouth every morning (before breakfast) (Patient taking differently: Take 40 mg by mouth 2 times daily ) 90 capsule 1    amLODIPine (NORVASC) 5 MG tablet Take 1 tablet by mouth daily 90 tablet 1    lisinopril-hydroCHLOROthiazide (PRINZIDE;ZESTORETIC) 20-12.5 MG per tablet take 1 tablet by mouth every morning 90 tablet 1    Cholecalciferol (VITAMIN D3) 50 MCG (2000 UT) CAPS Take by mouth        Zinc Sulfate (ZINC 15 PO) Take by mouth        Multiple Vitamins-Minerals (THERAPEUTIC MULTIVITAMIN-MINERALS) tablet Take 1 tablet by mouth daily        aspirin 81 MG EC tablet Take 81 mg by mouth daily          No current facility-administered medications for this visit.                  Allergies   Allergen Reactions    Covid-19 (Mrna) Vaccine Swelling       Throat swelling    Penicillins           Social History               Socioeconomic History    Marital status:        Spouse name: Not on file    Number of children: Not on file    Years of education: Not on file    Highest education level: Not on file   Occupational History    Not on file   Tobacco Use    Smoking status: Never Smoker    Smokeless tobacco: Never Used   Vaping Use    Vaping Use: Never used   Substance and Sexual Activity    Alcohol use:  Yes       Comment: 3/4 times a week    Drug use: Never    Sexual activity: Not on file   Other Topics Concern    Not on file   Social History Narrative    Not on file      Social Determinants of Health          Financial Resource Strain:     Difficulty of Paying Living Expenses:    Food Insecurity:     Worried About Running Out of Food in the Last Year:     [address] in the Last Year:    Transportation Needs:     Lack of Transportation (Medical):  Lack of Transportation (Non-Medical):    Physical Activity:     Days of Exercise per Week:     Minutes of Exercise per Session:    Stress:     Feeling of Stress :    Social Connections:     Frequency of Communication with Friends and Family:     Frequency of Social Gatherings with Friends and Family:     Attends Restoration Services:     Active Member of Clubs or Organizations:     Attends Club or Organization Meetings:     Marital Status:    Intimate Partner Violence:     Fear of Current or Ex-Partner:     Emotionally Abused:     Physically Abused:     Sexually Abused:             ROS:   Review of Systems   Constitutional: Positive for activity change, appetite change and unexpected weight change. Negative for chills, diaphoresis and fever. HENT: Negative for congestion, ear discharge, ear pain, hearing loss, nosebleeds and tinnitus. Eyes: Negative for photophobia, pain and discharge. Respiratory: Negative for shortness of breath. Cardiovascular: Negative for palpitations and leg swelling. Gastrointestinal: Positive for abdominal pain. Negative for blood in stool, constipation, diarrhea, nausea and vomiting. Endocrine: Negative for polydipsia. Genitourinary: Negative for frequency, hematuria and urgency. Musculoskeletal: Negative for back pain and neck pain. Skin: Negative for rash. Allergic/Immunologic: Negative for environmental allergies. Neurological: Negative for tremors and seizures. Psychiatric/Behavioral: Negative for hallucinations and suicidal ideas. The patient is not nervous/anxious.       Physical Exam:  /72 (Site: Right Upper Arm, Position: Sitting, Cuff Size: Medium Adult)   Pulse 90   Temp 97.9 °F (36.6 °C) (Temporal)   Resp 18   Ht 5' 11\" (1.803 m)   Wt 178 lb (80.7 kg)   SpO2 99%   BMI 24.83 kg/m²      PSYCH: mood and affect normal, alert and oriented x 3: No apparent distress, comfortable  EYES: Sclera white, pupils equal round and reactive to light  ENMT:  Hearing normal, trachea midline, ears externally intact  LYMPH: no obvious lympadenopathy in neck. RESP: Respiratory effort was normal with no retractions or use of accessory muscles. CV:  No pedal edema  GI/ Abdomen: Soft, nondistended, nontender, no guarding, no peritoneal signs  MSK: no clubbing/ no cyanosis/ gaitnormal     Assessment/Plan:  necrotizing pancreatitis with acute necrotic collections  - follow up   - start creon 72k  - low fat diet no more than 20g   - CT scan to evaluate the pancreatic pseudocysts  - Patient and family were made aware of the risks, benefits, alternatives, and complications of a  laparoscopic robotic cholecystectomy possible cholangiogram possible open and wish to proceed with surgery.          20 Minutes of which greater than 50% was spent counseling or coordinating his care.     Thank you for the consultation allowing me to take part in Mr. Mccullough's care.      RINKU Wells M.D.  8/19/2021  2:47 PM

## 2021-09-08 ENCOUNTER — ANESTHESIA (OUTPATIENT)
Dept: OPERATING ROOM | Age: 71
End: 2021-09-08
Payer: MEDICARE

## 2021-09-08 ENCOUNTER — HOSPITAL ENCOUNTER (OUTPATIENT)
Age: 71
Setting detail: OUTPATIENT SURGERY
Discharge: HOME | End: 2021-09-08
Attending: TRANSPLANT SURGERY | Admitting: TRANSPLANT SURGERY
Payer: MEDICARE

## 2021-09-08 VITALS
SYSTOLIC BLOOD PRESSURE: 167 MMHG | RESPIRATION RATE: 19 BRPM | TEMPERATURE: 97.3 F | OXYGEN SATURATION: 100 % | DIASTOLIC BLOOD PRESSURE: 103 MMHG

## 2021-09-08 VITALS
WEIGHT: 177 LBS | BODY MASS INDEX: 24.78 KG/M2 | TEMPERATURE: 97.9 F | SYSTOLIC BLOOD PRESSURE: 140 MMHG | HEART RATE: 93 BPM | OXYGEN SATURATION: 94 % | RESPIRATION RATE: 16 BRPM | HEIGHT: 71 IN | DIASTOLIC BLOOD PRESSURE: 86 MMHG

## 2021-09-08 DIAGNOSIS — K85.10 GALLSTONE PANCREATITIS: ICD-10-CM

## 2021-09-08 DIAGNOSIS — Z01.812 PRE-OPERATIVE LABORATORY EXAMINATION: ICD-10-CM

## 2021-09-08 DIAGNOSIS — K86.3 PANCREATIC PSEUDOCYST: ICD-10-CM

## 2021-09-08 DIAGNOSIS — Z90.49 S/P LAPAROSCOPIC CHOLECYSTECTOMY: ICD-10-CM

## 2021-09-08 DIAGNOSIS — K80.10 CHRONIC CHOLECYSTITIS WITH CALCULUS: ICD-10-CM

## 2021-09-08 DIAGNOSIS — K85.91 NECROTIZING PANCREATITIS: ICD-10-CM

## 2021-09-08 DIAGNOSIS — U07.1 COVID-19: Primary | ICD-10-CM

## 2021-09-08 LAB
ANION GAP SERPL CALCULATED.3IONS-SCNC: 10 MMOL/L (ref 7–16)
BUN BLDV-MCNC: 13 MG/DL (ref 6–23)
CALCIUM SERPL-MCNC: 9.3 MG/DL (ref 8.6–10.2)
CHLORIDE BLD-SCNC: 101 MMOL/L (ref 98–107)
CO2: 26 MMOL/L (ref 22–29)
CREAT SERPL-MCNC: 1 MG/DL (ref 0.7–1.2)
GFR AFRICAN AMERICAN: >60
GFR NON-AFRICAN AMERICAN: >60 ML/MIN/1.73
GLUCOSE BLD-MCNC: 94 MG/DL (ref 74–99)
POTASSIUM SERPL-SCNC: 4 MMOL/L (ref 3.5–5)
SODIUM BLD-SCNC: 137 MMOL/L (ref 132–146)

## 2021-09-08 PROCEDURE — 88304 TISSUE EXAM BY PATHOLOGIST: CPT

## 2021-09-08 PROCEDURE — 2500000003 HC RX 250 WO HCPCS: Performed by: TRANSPLANT SURGERY

## 2021-09-08 PROCEDURE — 7100000010 HC PHASE II RECOVERY - FIRST 15 MIN: Performed by: TRANSPLANT SURGERY

## 2021-09-08 PROCEDURE — 76998 US GUIDE INTRAOP: CPT | Performed by: TRANSPLANT SURGERY

## 2021-09-08 PROCEDURE — 7100000001 HC PACU RECOVERY - ADDTL 15 MIN: Performed by: TRANSPLANT SURGERY

## 2021-09-08 PROCEDURE — 7100000000 HC PACU RECOVERY - FIRST 15 MIN: Performed by: TRANSPLANT SURGERY

## 2021-09-08 PROCEDURE — 6360000002 HC RX W HCPCS: Performed by: NURSE ANESTHETIST, CERTIFIED REGISTERED

## 2021-09-08 PROCEDURE — 47562 LAPAROSCOPIC CHOLECYSTECTOMY: CPT | Performed by: TRANSPLANT SURGERY

## 2021-09-08 PROCEDURE — 3700000000 HC ANESTHESIA ATTENDED CARE: Performed by: TRANSPLANT SURGERY

## 2021-09-08 PROCEDURE — 2580000003 HC RX 258: Performed by: TRANSPLANT SURGERY

## 2021-09-08 PROCEDURE — 3700000001 HC ADD 15 MINUTES (ANESTHESIA): Performed by: TRANSPLANT SURGERY

## 2021-09-08 PROCEDURE — 3600000019 HC SURGERY ROBOT ADDTL 15MIN: Performed by: TRANSPLANT SURGERY

## 2021-09-08 PROCEDURE — 2500000003 HC RX 250 WO HCPCS: Performed by: NURSE ANESTHETIST, CERTIFIED REGISTERED

## 2021-09-08 PROCEDURE — 2580000003 HC RX 258: Performed by: NURSE ANESTHETIST, CERTIFIED REGISTERED

## 2021-09-08 PROCEDURE — S2900 ROBOTIC SURGICAL SYSTEM: HCPCS | Performed by: TRANSPLANT SURGERY

## 2021-09-08 PROCEDURE — 6360000002 HC RX W HCPCS: Performed by: TRANSPLANT SURGERY

## 2021-09-08 PROCEDURE — 3600000009 HC SURGERY ROBOT BASE: Performed by: TRANSPLANT SURGERY

## 2021-09-08 PROCEDURE — 36415 COLL VENOUS BLD VENIPUNCTURE: CPT

## 2021-09-08 PROCEDURE — 2709999900 HC NON-CHARGEABLE SUPPLY: Performed by: TRANSPLANT SURGERY

## 2021-09-08 PROCEDURE — 80048 BASIC METABOLIC PNL TOTAL CA: CPT

## 2021-09-08 PROCEDURE — 7100000011 HC PHASE II RECOVERY - ADDTL 15 MIN: Performed by: TRANSPLANT SURGERY

## 2021-09-08 RX ORDER — FENTANYL CITRATE 50 UG/ML
INJECTION, SOLUTION INTRAMUSCULAR; INTRAVENOUS PRN
Status: DISCONTINUED | OUTPATIENT
Start: 2021-09-08 | End: 2021-09-08 | Stop reason: SDUPTHER

## 2021-09-08 RX ORDER — SODIUM CHLORIDE 9 MG/ML
INJECTION, SOLUTION INTRAVENOUS CONTINUOUS PRN
Status: DISCONTINUED | OUTPATIENT
Start: 2021-09-08 | End: 2021-09-08 | Stop reason: SDUPTHER

## 2021-09-08 RX ORDER — LIDOCAINE HYDROCHLORIDE 20 MG/ML
INJECTION, SOLUTION INTRAVENOUS PRN
Status: DISCONTINUED | OUTPATIENT
Start: 2021-09-08 | End: 2021-09-08 | Stop reason: SDUPTHER

## 2021-09-08 RX ORDER — DEXAMETHASONE SODIUM PHOSPHATE 10 MG/ML
INJECTION INTRAMUSCULAR; INTRAVENOUS PRN
Status: DISCONTINUED | OUTPATIENT
Start: 2021-09-08 | End: 2021-09-08 | Stop reason: SDUPTHER

## 2021-09-08 RX ORDER — BUPIVACAINE HYDROCHLORIDE 5 MG/ML
INJECTION, SOLUTION EPIDURAL; INTRACAUDAL PRN
Status: DISCONTINUED | OUTPATIENT
Start: 2021-09-08 | End: 2021-09-08 | Stop reason: ALTCHOICE

## 2021-09-08 RX ORDER — ONDANSETRON 2 MG/ML
INJECTION INTRAMUSCULAR; INTRAVENOUS PRN
Status: DISCONTINUED | OUTPATIENT
Start: 2021-09-08 | End: 2021-09-08 | Stop reason: SDUPTHER

## 2021-09-08 RX ORDER — OXYCODONE HYDROCHLORIDE 5 MG/1
5 TABLET ORAL EVERY 6 HOURS PRN
Qty: 28 TABLET | Refills: 0 | Status: SHIPPED | OUTPATIENT
Start: 2021-09-08 | End: 2021-09-15

## 2021-09-08 RX ORDER — MEPERIDINE HYDROCHLORIDE 25 MG/ML
12.5 INJECTION INTRAMUSCULAR; INTRAVENOUS; SUBCUTANEOUS EVERY 5 MIN PRN
Status: DISCONTINUED | OUTPATIENT
Start: 2021-09-08 | End: 2021-09-08 | Stop reason: HOSPADM

## 2021-09-08 RX ORDER — HYDRALAZINE HYDROCHLORIDE 20 MG/ML
5 INJECTION INTRAMUSCULAR; INTRAVENOUS EVERY 10 MIN PRN
Status: DISCONTINUED | OUTPATIENT
Start: 2021-09-08 | End: 2021-09-08 | Stop reason: HOSPADM

## 2021-09-08 RX ORDER — ACETAMINOPHEN 500 MG
500 TABLET ORAL EVERY 6 HOURS
Qty: 56 TABLET | Refills: 0 | Status: SHIPPED | OUTPATIENT
Start: 2021-09-08 | End: 2022-06-03

## 2021-09-08 RX ORDER — LABETALOL HYDROCHLORIDE 5 MG/ML
INJECTION, SOLUTION INTRAVENOUS PRN
Status: DISCONTINUED | OUTPATIENT
Start: 2021-09-08 | End: 2021-09-08 | Stop reason: SDUPTHER

## 2021-09-08 RX ORDER — IBUPROFEN 800 MG/1
800 TABLET ORAL
Qty: 42 TABLET | Refills: 0 | Status: SHIPPED | OUTPATIENT
Start: 2021-09-08 | End: 2021-11-24

## 2021-09-08 RX ORDER — LABETALOL HYDROCHLORIDE 5 MG/ML
5 INJECTION, SOLUTION INTRAVENOUS EVERY 10 MIN PRN
Status: DISCONTINUED | OUTPATIENT
Start: 2021-09-08 | End: 2021-09-08 | Stop reason: HOSPADM

## 2021-09-08 RX ORDER — PROPOFOL 10 MG/ML
INJECTION, EMULSION INTRAVENOUS PRN
Status: DISCONTINUED | OUTPATIENT
Start: 2021-09-08 | End: 2021-09-08 | Stop reason: SDUPTHER

## 2021-09-08 RX ORDER — SODIUM CHLORIDE 0.9 % (FLUSH) 0.9 %
5-40 SYRINGE (ML) INJECTION PRN
Status: DISCONTINUED | OUTPATIENT
Start: 2021-09-08 | End: 2021-09-08 | Stop reason: HOSPADM

## 2021-09-08 RX ORDER — NEOSTIGMINE METHYLSULFATE 1 MG/ML
INJECTION, SOLUTION INTRAVENOUS PRN
Status: DISCONTINUED | OUTPATIENT
Start: 2021-09-08 | End: 2021-09-08 | Stop reason: SDUPTHER

## 2021-09-08 RX ORDER — ROCURONIUM BROMIDE 10 MG/ML
INJECTION, SOLUTION INTRAVENOUS PRN
Status: DISCONTINUED | OUTPATIENT
Start: 2021-09-08 | End: 2021-09-08 | Stop reason: SDUPTHER

## 2021-09-08 RX ORDER — SODIUM CHLORIDE 9 MG/ML
25 INJECTION, SOLUTION INTRAVENOUS PRN
Status: DISCONTINUED | OUTPATIENT
Start: 2021-09-08 | End: 2021-09-08 | Stop reason: HOSPADM

## 2021-09-08 RX ORDER — INDOCYANINE GREEN AND WATER 25 MG
5 KIT INJECTION ONCE
Status: COMPLETED | OUTPATIENT
Start: 2021-09-08 | End: 2021-09-08

## 2021-09-08 RX ORDER — OXYCODONE HYDROCHLORIDE AND ACETAMINOPHEN 5; 325 MG/1; MG/1
1 TABLET ORAL
Status: DISCONTINUED | OUTPATIENT
Start: 2021-09-08 | End: 2021-09-08 | Stop reason: HOSPADM

## 2021-09-08 RX ORDER — SENNA AND DOCUSATE SODIUM 50; 8.6 MG/1; MG/1
1 TABLET, FILM COATED ORAL DAILY
Qty: 14 TABLET | Refills: 0 | Status: SHIPPED | OUTPATIENT
Start: 2021-09-08 | End: 2021-09-22

## 2021-09-08 RX ORDER — MIDAZOLAM HYDROCHLORIDE 1 MG/ML
INJECTION INTRAMUSCULAR; INTRAVENOUS PRN
Status: DISCONTINUED | OUTPATIENT
Start: 2021-09-08 | End: 2021-09-08 | Stop reason: SDUPTHER

## 2021-09-08 RX ORDER — PROMETHAZINE HYDROCHLORIDE 25 MG/ML
6.25 INJECTION, SOLUTION INTRAMUSCULAR; INTRAVENOUS
Status: DISCONTINUED | OUTPATIENT
Start: 2021-09-08 | End: 2021-09-08 | Stop reason: HOSPADM

## 2021-09-08 RX ORDER — GLYCOPYRROLATE 1 MG/5 ML
SYRINGE (ML) INTRAVENOUS PRN
Status: DISCONTINUED | OUTPATIENT
Start: 2021-09-08 | End: 2021-09-08 | Stop reason: SDUPTHER

## 2021-09-08 RX ORDER — SODIUM CHLORIDE 0.9 % (FLUSH) 0.9 %
5-40 SYRINGE (ML) INJECTION EVERY 12 HOURS SCHEDULED
Status: DISCONTINUED | OUTPATIENT
Start: 2021-09-08 | End: 2021-09-08 | Stop reason: HOSPADM

## 2021-09-08 RX ADMIN — SODIUM CHLORIDE: 9 INJECTION, SOLUTION INTRAVENOUS at 08:22

## 2021-09-08 RX ADMIN — ROCURONIUM BROMIDE 50 MG: 10 INJECTION, SOLUTION INTRAVENOUS at 07:36

## 2021-09-08 RX ADMIN — INDOCYANINE GREEN AND WATER 5 MG: KIT at 06:07

## 2021-09-08 RX ADMIN — FENTANYL CITRATE 150 MCG: 50 INJECTION, SOLUTION INTRAMUSCULAR; INTRAVENOUS at 07:36

## 2021-09-08 RX ADMIN — PROPOFOL 150 MG: 10 INJECTION, EMULSION INTRAVENOUS at 07:36

## 2021-09-08 RX ADMIN — LABETALOL HYDROCHLORIDE 2.5 MG: 5 INJECTION INTRAVENOUS at 09:56

## 2021-09-08 RX ADMIN — LABETALOL HYDROCHLORIDE 5 MG: 5 INJECTION INTRAVENOUS at 07:56

## 2021-09-08 RX ADMIN — Medication 2000 MG: at 07:42

## 2021-09-08 RX ADMIN — ROCURONIUM BROMIDE 10 MG: 10 INJECTION, SOLUTION INTRAVENOUS at 08:33

## 2021-09-08 RX ADMIN — Medication 3 MG: at 09:36

## 2021-09-08 RX ADMIN — SODIUM CHLORIDE 25 ML: 9 INJECTION, SOLUTION INTRAVENOUS at 06:04

## 2021-09-08 RX ADMIN — Medication 0.5 MG: at 09:36

## 2021-09-08 RX ADMIN — DEXAMETHASONE SODIUM PHOSPHATE 10 MG: 10 INJECTION INTRAMUSCULAR; INTRAVENOUS at 07:47

## 2021-09-08 RX ADMIN — MIDAZOLAM 2 MG: 1 INJECTION INTRAMUSCULAR; INTRAVENOUS at 07:23

## 2021-09-08 RX ADMIN — SODIUM CHLORIDE: 9 INJECTION, SOLUTION INTRAVENOUS at 07:23

## 2021-09-08 RX ADMIN — ROCURONIUM BROMIDE 10 MG: 10 INJECTION, SOLUTION INTRAVENOUS at 09:02

## 2021-09-08 RX ADMIN — LIDOCAINE HYDROCHLORIDE 100 MG: 20 INJECTION, SOLUTION INTRAVENOUS at 07:36

## 2021-09-08 RX ADMIN — ONDANSETRON HYDROCHLORIDE 4 MG: 2 INJECTION, SOLUTION INTRAMUSCULAR; INTRAVENOUS at 09:36

## 2021-09-08 RX ADMIN — FENTANYL CITRATE 100 MCG: 50 INJECTION, SOLUTION INTRAMUSCULAR; INTRAVENOUS at 07:52

## 2021-09-08 ASSESSMENT — PULMONARY FUNCTION TESTS
PIF_VALUE: 26
PIF_VALUE: 25
PIF_VALUE: 3
PIF_VALUE: 27
PIF_VALUE: 27
PIF_VALUE: 26
PIF_VALUE: 20
PIF_VALUE: 27
PIF_VALUE: 26
PIF_VALUE: 27
PIF_VALUE: 24
PIF_VALUE: 16
PIF_VALUE: 20
PIF_VALUE: 27
PIF_VALUE: 24
PIF_VALUE: 16
PIF_VALUE: 17
PIF_VALUE: 2
PIF_VALUE: 24
PIF_VALUE: 27
PIF_VALUE: 1
PIF_VALUE: 26
PIF_VALUE: 25
PIF_VALUE: 26
PIF_VALUE: 27
PIF_VALUE: 27
PIF_VALUE: 26
PIF_VALUE: 25
PIF_VALUE: 26
PIF_VALUE: 26
PIF_VALUE: 9
PIF_VALUE: 1
PIF_VALUE: 24
PIF_VALUE: 22
PIF_VALUE: 21
PIF_VALUE: 26
PIF_VALUE: 27
PIF_VALUE: 20
PIF_VALUE: 27
PIF_VALUE: 30
PIF_VALUE: 14
PIF_VALUE: 25
PIF_VALUE: 18
PIF_VALUE: 26
PIF_VALUE: 16
PIF_VALUE: 14
PIF_VALUE: 14
PIF_VALUE: 26
PIF_VALUE: 25
PIF_VALUE: 26
PIF_VALUE: 25
PIF_VALUE: 26
PIF_VALUE: 27
PIF_VALUE: 26
PIF_VALUE: 16
PIF_VALUE: 16
PIF_VALUE: 27
PIF_VALUE: 27
PIF_VALUE: 15
PIF_VALUE: 24
PIF_VALUE: 3
PIF_VALUE: 17
PIF_VALUE: 18
PIF_VALUE: 27
PIF_VALUE: 20
PIF_VALUE: 26
PIF_VALUE: 23
PIF_VALUE: 27
PIF_VALUE: 27
PIF_VALUE: 30
PIF_VALUE: 24
PIF_VALUE: 27
PIF_VALUE: 26
PIF_VALUE: 1
PIF_VALUE: 27
PIF_VALUE: 16
PIF_VALUE: 27
PIF_VALUE: 25
PIF_VALUE: 25
PIF_VALUE: 28
PIF_VALUE: 16
PIF_VALUE: 14
PIF_VALUE: 26
PIF_VALUE: 27
PIF_VALUE: 27
PIF_VALUE: 16
PIF_VALUE: 14
PIF_VALUE: 26
PIF_VALUE: 26
PIF_VALUE: 23
PIF_VALUE: 22
PIF_VALUE: 24
PIF_VALUE: 26
PIF_VALUE: 20
PIF_VALUE: 25
PIF_VALUE: 24
PIF_VALUE: 27
PIF_VALUE: 22
PIF_VALUE: 25
PIF_VALUE: 22
PIF_VALUE: 16
PIF_VALUE: 26
PIF_VALUE: 26
PIF_VALUE: 27
PIF_VALUE: 26
PIF_VALUE: 26
PIF_VALUE: 14
PIF_VALUE: 16
PIF_VALUE: 26
PIF_VALUE: 15
PIF_VALUE: 16
PIF_VALUE: 24
PIF_VALUE: 27
PIF_VALUE: 16
PIF_VALUE: 26
PIF_VALUE: 16
PIF_VALUE: 26
PIF_VALUE: 15
PIF_VALUE: 26
PIF_VALUE: 27
PIF_VALUE: 1
PIF_VALUE: 1
PIF_VALUE: 28
PIF_VALUE: 26
PIF_VALUE: 28
PIF_VALUE: 21
PIF_VALUE: 26
PIF_VALUE: 24
PIF_VALUE: 26
PIF_VALUE: 27
PIF_VALUE: 26
PIF_VALUE: 26
PIF_VALUE: 0
PIF_VALUE: 20
PIF_VALUE: 26
PIF_VALUE: 5
PIF_VALUE: 27
PIF_VALUE: 1

## 2021-09-08 ASSESSMENT — PAIN DESCRIPTION - PAIN TYPE: TYPE: SURGICAL PAIN

## 2021-09-08 ASSESSMENT — PAIN DESCRIPTION - FREQUENCY: FREQUENCY: INTERMITTENT

## 2021-09-08 ASSESSMENT — PAIN DESCRIPTION - LOCATION: LOCATION: ABDOMEN

## 2021-09-08 ASSESSMENT — PAIN DESCRIPTION - ONSET: ONSET: ON-GOING

## 2021-09-08 ASSESSMENT — PAIN DESCRIPTION - ORIENTATION: ORIENTATION: LEFT

## 2021-09-08 ASSESSMENT — PAIN DESCRIPTION - PROGRESSION: CLINICAL_PROGRESSION: NOT CHANGED

## 2021-09-08 ASSESSMENT — PAIN SCALES - GENERAL
PAINLEVEL_OUTOF10: 0
PAINLEVEL_OUTOF10: 0
PAINLEVEL_OUTOF10: 6

## 2021-09-08 ASSESSMENT — PAIN - FUNCTIONAL ASSESSMENT: PAIN_FUNCTIONAL_ASSESSMENT: 0-10

## 2021-09-08 ASSESSMENT — PAIN DESCRIPTION - DESCRIPTORS: DESCRIPTORS: CRAMPING;DISCOMFORT

## 2021-09-08 NOTE — ANESTHESIA PRE PROCEDURE
Department of Anesthesiology  Preprocedure Note       Name:  Alyssa Ng   Age:  70 y.o.  :  1950                                          MRN:  [id number]         Date:  2021      Surgeon: Sabrina Epstein):  Yuri Walls MD    Procedure: Procedure(s):  LAP ROBOTIC CHOLECYSTECTOMY POSSIBLE CHOLANGIOGRAM POSSIBLE OPEN    Medications prior to admission:   Prior to Admission medications    Medication Sig Start Date End Date Taking?  Authorizing Provider   lipase-protease-amylase (CREON) 13978-735765 units CPEP delayed release capsule Take 2 capsules by mouth 3 times daily (before meals) 21  Yes Yesenia Sam III, MD   omeprazole (PRILOSEC) 40 MG delayed release capsule Take 1 capsule by mouth every morning (before breakfast)  Patient taking differently: Take 40 mg by mouth daily  21  Yes Chuyita Sharma DO   amLODIPine (NORVASC) 5 MG tablet Take 1 tablet by mouth daily  Patient taking differently: Take 5 mg by mouth nightly  21  Yes Chuyita Sharma DO   lisinopril-hydroCHLOROthiazide (PRINZIDE;ZESTORETIC) 20-12.5 MG per tablet take 1 tablet by mouth every morning 21  Yes Chuyita Sharma DO   Cholecalciferol (VITAMIN D3) 50 MCG ( UT) CAPS Take by mouth   Yes Historical Provider, MD   Zinc Sulfate (ZINC 15 PO) Take by mouth   Yes Historical Provider, MD   Multiple Vitamins-Minerals (THERAPEUTIC MULTIVITAMIN-MINERALS) tablet Take 1 tablet by mouth daily   Yes Historical Provider, MD   aspirin 81 MG EC tablet Take 81 mg by mouth daily   Yes Historical Provider, MD       Current medications:    Current Facility-Administered Medications   Medication Dose Route Frequency Provider Last Rate Last Admin    sodium chloride flush 0.9 % injection 5-40 mL  5-40 mL IntraVENous 2 times per day Yesenia Sam III, MD        sodium chloride flush 0.9 % injection 5-40 mL  5-40 mL IntraVENous PRN Yesenia Sam III, MD        0.9 % sodium chloride infusion  25 mL IntraVENous PRN Pieter James III,  mL/hr at 09/08/21 0604 25 mL at 09/08/21 0604    ceFAZolin (ANCEF) 2000 mg in sterile water 20 mL IV syringe  2,000 mg IntraVENous On Call to Kris Her III, MD           Allergies:     Allergies   Allergen Reactions    Covid-19 (Mrna) Vaccine Swelling     Throat swelling    Penicillins        Problem List:    Patient Active Problem List   Diagnosis Code    GERD without esophagitis K21.9    Benign essential hypertension I10    Hyperlipidemia E78.5       Past Medical History:        Diagnosis Date    Colon polyps     DJD (degenerative joint disease)     GERD (gastroesophageal reflux disease)     peptic reflux disease    Headache     Hyperlipidemia     Hypertension     Necrotizing pancreatitis 09/2021    Osteoarthritis        Past Surgical History:        Procedure Laterality Date    COLONOSCOPY      ENDOSCOPY, COLON, DIAGNOSTIC      KNEE CARTILAGE SURGERY      UPPER GASTROINTESTINAL ENDOSCOPY N/A 8/9/2021    EGD ESOPHAGOGASTRODUODENOSCOPY ULTRASOUND performed by Dick Harmon MD at [address] History:    Social History     Tobacco Use    Smoking status: Never Smoker    Smokeless tobacco: Never Used   Substance Use Topics    Alcohol use: Yes     Comment: 3/4 times a week                                Counseling given: Not Answered      Vital Signs (Current):   Vitals:    09/02/21 1423 09/08/21 0535   BP:  (!) 154/82   Pulse:  80   Resp:  20   Temp:  36.2 °C (97.2 °F)   TempSrc:  Temporal   SpO2:  98%   Weight: 177 lb (80.3 kg) 177 lb (80.3 kg)   Height: 5' 11\" (1.803 m) 5' 11\" (1.803 m)                                              BP Readings from Last 3 Encounters:   09/08/21 (!) 154/82   09/02/21 118/70   08/19/21 115/72       NPO Status: Time of last liquid consumption: 2130                        Time of last solid consumption: 2130                        Date of last liquid consumption: 09/07/21                        Date of last solid food consumption: 09/07/21    BMI:   Wt Readings from Last 3 Encounters:   09/08/21 177 lb (80.3 kg)   09/02/21 177 lb (80.3 kg)   08/19/21 178 lb (80.7 kg)     Body mass index is 24.69 kg/m². CBC:   Lab Results   Component Value Date    WBC 7.6 06/02/2021    RBC 4.58 06/02/2021    HGB 14.8 06/02/2021    HCT 44.7 06/02/2021    MCV 97.6 06/02/2021    RDW 13.5 06/02/2021     06/02/2021       CMP:   Lab Results   Component Value Date     09/01/2021    K 3.8 09/01/2021     09/01/2021    CO2 29 09/01/2021    BUN 12 09/01/2021    CREATININE 1.0 09/01/2021    CREATININE 1.1 04/12/2019    GFRAA >60 09/01/2021    LABGLOM >60 09/01/2021    GLUCOSE 124 09/01/2021    PROT 7.3 06/02/2021    CALCIUM 9.3 09/01/2021    BILITOT 0.8 06/02/2021    ALKPHOS 59 06/02/2021    AST 31 06/02/2021    ALT 31 06/02/2021       POC Tests: No results for input(s): POCGLU, POCNA, POCK, POCCL, POCBUN, POCHEMO, POCHCT in the last 72 hours. Coags: No results found for: PROTIME, INR, APTT    HCG (If Applicable): No results found for: PREGTESTUR, PREGSERUM, HCG, HCGQUANT     ABGs: No results found for: PHART, PO2ART, KBP8CVY, PFL3MWB, BEART, S0HHXEUI     Type & Screen (If Applicable):  No results found for: LABABO, LABRH    Drug/Infectious Status (If Applicable):  No results found for: HIV, HEPCAB    COVID-19 Screening (If Applicable):   Lab Results   Component Value Date    COVID19 Not Detected 09/03/2021     CT abdomen 9/2/21  Impression   1. A new 9.8 cm round fluid collection along the superior right lobe of the   liver is compatible with a pseudocyst, which contacts the right hemidiaphragm   with interval development of a right pleural effusion.    2. The dominant pseudocyst previously seen in the left superior perihilar   region of the liver appears resolved with interval development of loculated   fluid extending along the medial liver margin and surrounding the right   adrenal gland. 3. The lobular cystic structure involving the pancreatic head appears mildly   increased, now measuring up to 7.0 cm (6.3 cm previously) and demonstrates   increased mass effect on the main portal vein without occlusion or   thrombosis.  Complex appearance of a medial lobe of this structure is similar   to the previous exam and may be secondary to focal debris. 4.  Cholelithiasis is present without evidence of cholecystitis or biliary   obstruction. 5. Splenomegaly. EKG 9/2/21  Sinus  Rhythm   Low voltage in limb leads.    -  Nonspecific T-abnormality. Anesthesia Evaluation  Patient summary reviewed no history of anesthetic complications:   Airway: Mallampati: II  TM distance: >3 FB   Neck ROM: full  Mouth opening: > = 3 FB Dental:          Pulmonary:Negative Pulmonary ROS breath sounds clear to auscultation                             Cardiovascular:  Exercise tolerance: good (>4 METS),   (+) hypertension:, hyperlipidemia      ECG reviewed  Rhythm: regular  Rate: normal           Beta Blocker:  Not on Beta Blocker         Neuro/Psych:   (+) headaches:,             GI/Hepatic/Renal:   (+) GERD: well controlled,          ROS comment:  2.8 cm cystic mass at the pancreatic head . Endo/Other:    (+) : arthritis: OA., .                 Abdominal:             Vascular: negative vascular ROS. Other Findings:           Anesthesia Plan      general     ASA 2       Induction: intravenous. MIPS: Postoperative opioids intended, Prophylactic antiemetics administered and Postoperative trial extubation. Anesthetic plan and risks discussed with patient and spouse. Use of blood products discussed with patient and spouse whom consented to blood products. Plan discussed with CRNA and attending. Mirna Reed RN   9/8/2021      -------DOS anesthesiologist addendum-------  Patient seen and evaluated. Plan for GETA discussed with patient.   All patient's

## 2021-09-08 NOTE — OP NOTE
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 OPERATION: Laparoscopic Robotic Assisted Cholecystectomy with intraoperative ultrasound for anatomy. ANESTHESIA: General endotracheal.    ESTIMATED BLOOD LOSS: Less than 50mL. COMPLICATIONS: None. FLUIDS: Crystalloid. SPECIMEN: Gallbladder. DESCRIPTION OF PROCEDURE: This is a 70 y.o.male with increasingly symptomatic right upper quadrant epigastric pain. He had an ultrasound showing cholelithiasis of the gallbladder. And was found to have gallstone pancreatitis. His EUS showed sludge in his gallbladder. After being explained the risks, benefits, and alternatives of the procedure, he agreed to proceed.     He was taken to the operating room and placed supine on the operating room table, administered general anesthesia and intubated. Once the airway was secured and the patient was adequately sedated a time-out was performed to confirm the surgical site and the patient's name.     We initially made a supraumbilial incision and inserted the Veress needle. Confirmed to be in place we insufflated to 15mmHg mercury. The camera was inserted and an 8mm trocar was inserted at the umbilicus, a 9.2KT trocar to the right of the umbilicus a 4th 5mm trocar in the right lateral abdomen.  An 8-mm incision in the left side of the abomen and a robotic port was inserted.  We then inserted a camera throught the umbilical port and docked the robot.  We inspected the abdomen appreciating a significant amount of  inflammation around the right upper quadrant near the gallbladder.  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 The hepatic artery was identified running under the common bile duct. Pictures were taken, interpreted by me and placed on the chart. Suction irrigation was used to irrigate out the hilum and under the right lobe of the liver. The gallbladder and neck and infundibilum were placed in an endobag and retracted through the left lower quadrant port site. I then reapproximated the left lower quadrant port site closing it with 2.0 vicryl on a reny taran needle.  Then we used 4-0 Monocryl suture to reapproximate the skin. Surgical glue was placed over the skin.     The patient was awoken and extubated in the operating room without any difficulty, and transferred to the postoperative care unit in stable condition. All instrument counts, lap counts, and needle counts were correct at the completion of the procedure.     Electronically signed by Carlton Feliz MD on 9/8/2021 at 9:52 AM stable

## 2021-09-08 NOTE — ANESTHESIA POSTPROCEDURE EVALUATION
Department of Anesthesiology  Postprocedure Note    Patient: Keerthi Stephens  MRN: [id number]  YOB: 1950  Date of evaluation: 9/8/2021  Time:  1:55 PM     Procedure Summary     Date: 09/08/21 Room / Location: [address] [place] [address] / [facility]    Anesthesia Start: 9598 Anesthesia Stop: 1000    Procedure: LAP ROBOTIC CHOLECYSTECTOMY WITH INTRAOPERATIVE ULTRASOUND (N/A Abdomen) Diagnosis: (CHOLECYSTITIS)    Surgeons: Ashley Landry MD Responsible Provider: Madhavi Salomon MD    Anesthesia Type: general ASA Status: 2          Anesthesia Type: general    Nicole Phase I: Nicole Score: 9    Nicole Phase II: Nicole Score: 9    Last vitals: Reviewed and per EMR flowsheets.        Anesthesia Post Evaluation    Patient location during evaluation: PACU  Patient participation: complete - patient participated  Level of consciousness: awake and alert  Airway patency: patent  Nausea & Vomiting: no nausea and no vomiting  Complications: no  Cardiovascular status: hemodynamically stable  Respiratory status: acceptable  Hydration status: euvolemic

## 2021-09-08 NOTE — INTERVAL H&P NOTE
Update History & Physical    The patient's History and Physical of August 19, 2021 was reviewed with the patient and I examined the patient. There was no change. The surgical site was confirmed by the patient and me. Plan: The risks, benefits, expected outcome, and alternative to the recommended procedure have been discussed with the patient. Patient understands and wants to proceed with the procedure.      Electronically signed by Vanessa Cavazos MD on 9/8/2021 at 7:21 AM

## 2021-09-13 ENCOUNTER — TELEPHONE (OUTPATIENT)
Dept: HEMATOLOGY | Age: 71
End: 2021-09-13

## 2021-09-13 NOTE — TELEPHONE ENCOUNTER
Patient called in to make his post op appt. Appt made for 9/23/21 at 3:15pm at [facility]. He confirmed this appt.     Electronically signed by Tanya Agosto RN on 9/13/2021 at 10:42 AM

## 2021-09-28 ENCOUNTER — OFFICE VISIT (OUTPATIENT)
Dept: SURGERY | Age: 71
End: 2021-09-28

## 2021-09-28 VITALS
WEIGHT: 169 LBS | HEIGHT: 71 IN | OXYGEN SATURATION: 94 % | DIASTOLIC BLOOD PRESSURE: 86 MMHG | TEMPERATURE: 97.9 F | SYSTOLIC BLOOD PRESSURE: 140 MMHG | BODY MASS INDEX: 23.66 KG/M2 | HEART RATE: 93 BPM

## 2021-09-28 DIAGNOSIS — Z90.49 S/P LAPAROSCOPIC CHOLECYSTECTOMY: Primary | ICD-10-CM

## 2021-09-28 PROCEDURE — 99024 POSTOP FOLLOW-UP VISIT: CPT | Performed by: TRANSPLANT SURGERY

## 2021-09-28 NOTE — PROGRESS NOTES
Hepatobiliary and Pancreatic Surgery Attending History and Physical    Patient's Name/Date of Birth: Aicha Cui /1950 (70 y.o.)    Date: September 28, 2021     CC:Pancreatic cyst    HPI:  Patient is a very pleasant 70year old male whom states that he had [place] in November and had some abdominal pain. His epigastric abdominal pain has been off and on since then. He states that when he eats a fatty meal it seems to trigger his attack. He has never had pancreatitis. He has lost 20 lbs. He does drink 1 alcoholic beverage nightly with a night cap but when he has pain he does not drink. He denies any smoking. And currently he denies any pain. His EUS showed 2.8 cm cystic mass at the pancreatic head with persistent pancreatic and peripancreatic inflammation consistent with pancreatitis and pancreatic pseudocyst.  The pancreatic duct is mildly dilated to 2.5 mm throughout the pancreas. He had a repeat triphasic CT scan which showed pancreatic head necrosis. He underwent a laparoscopic robotic cholecystectomy which showed chronic cholecystitis with cholelithiasis. He is sticking to a low fat diet and is getting his energy back. He is tolerating 20g of fat daily and is taking his creon. Physical Exam:  BP (!) 140/86   Pulse 93   Temp 97.9 °F (36.6 °C)   Ht 5' 11\" (1.803 m)   Wt 169 lb (76.7 kg)   SpO2 94%   BMI 23.57 kg/m²     PSYCH: mood and affect normal, alert and oriented x 3: No apparent distress, comfortable  EYES: Sclera white, pupils equal round and reactive to light  ENMT:  Hearing normal, trachea midline, ears externally intact  LYMPH: no obvious lympadenopathy in neck. RESP: Respiratory effort was normal with no retractions or use of accessory muscles.   CV:  No pedal edema  GI/ Abdomen: Soft, nondistended, nontender, no guarding, no peritoneal signs  MSK: no clubbing/ no cyanosis/ gaitnormal       Assessment/Plan:  Necrotizing pancreatitis with acute necrotic collections s/p cholecystectomy 8/21  - increase to 30g of fat daily  - continue creon  - follow up with me in 4 weeks    Thank you for the consultation allowing me to take part in Mr. Mccullough's care.      Aaron Horta M.D.  9/28/2021  10:48 AM

## 2021-10-26 ENCOUNTER — OFFICE VISIT (OUTPATIENT)
Dept: SURGERY | Age: 71
End: 2021-10-26

## 2021-10-26 ENCOUNTER — HOSPITAL ENCOUNTER (OUTPATIENT)
Age: 71
Discharge: HOME | End: 2021-10-26
Payer: MEDICARE

## 2021-10-26 VITALS
HEIGHT: 71 IN | WEIGHT: 171 LBS | HEART RATE: 93 BPM | OXYGEN SATURATION: 94 % | SYSTOLIC BLOOD PRESSURE: 140 MMHG | DIASTOLIC BLOOD PRESSURE: 86 MMHG | TEMPERATURE: 97.9 F | BODY MASS INDEX: 23.94 KG/M2

## 2021-10-26 DIAGNOSIS — K86.3 PANCREATIC PSEUDOCYST: ICD-10-CM

## 2021-10-26 DIAGNOSIS — K85.11 ACUTE BILIARY PANCREATITIS WITH UNINFECTED NECROSIS: Primary | ICD-10-CM

## 2021-10-26 DIAGNOSIS — K86.81 EXOCRINE PANCREATIC INSUFFICIENCY: ICD-10-CM

## 2021-10-26 PROCEDURE — 99024 POSTOP FOLLOW-UP VISIT: CPT | Performed by: TRANSPLANT SURGERY

## 2021-10-26 PROCEDURE — 82705 FATS/LIPIDS FECES QUAL: CPT

## 2021-10-26 RX ORDER — PANCRELIPASE 36000; 180000; 114000 [USP'U]/1; [USP'U]/1; [USP'U]/1
2 CAPSULE, DELAYED RELEASE PELLETS ORAL
Qty: 250 CAPSULE | Refills: 5 | Status: SHIPPED | OUTPATIENT
Start: 2021-10-26

## 2021-10-26 NOTE — PROGRESS NOTES
Hepatobiliary and Pancreatic Surgery Attending History and Physical    Patient's Name/Date of Birth: Mariposa Aw /1950 (52 y.o.)    Date: October 26, 2021     CC:Pancreatic cyst    HPI:  Patient is a very pleasant 70year old male whom states that he had [place] in November and had some abdominal pain. His epigastric abdominal pain has been off and on since then. He states that when he eats a fatty meal it seems to trigger his attack. He has never had pancreatitis. He has lost 20 lbs. He does drink 1 alcoholic beverage nightly with a night cap but when he has pain he does not drink. He denies any smoking. And currently he denies any pain. His EUS showed 2.8 cm cystic mass at the pancreatic head with persistent pancreatic and peripancreatic inflammation consistent with pancreatitis and pancreatic pseudocyst.  The pancreatic duct is mildly dilated to 2.5 mm throughout the pancreas. He had a repeat triphasic CT scan which showed pancreatic head necrosis 9/21. He underwent a laparoscopic robotic cholecystectomy which showed chronic cholecystitis with cholelithiasis. He is sticking to a low fat diet and is getting his energy back. He is tolerating 20g-30g of fat daily and is taking his creon. He had gained 3lbs. He did have one episode after eating out. He is taking 2 creon with meals but is not taking any creon with snacks. His is having increased flatulence with meals. Physical Exam:  BP (!) 140/86   Pulse 93   Temp 97.9 °F (36.6 °C)   Ht 5' 11\" (1.803 m)   Wt 171 lb (77.6 kg)   SpO2 94%   BMI 23.85 kg/m²     PSYCH: mood and affect normal, alert and oriented x 3: No apparent distress, comfortable  EYES: Sclera white, pupils equal round and reactive to light  ENMT:  Hearing normal, trachea midline, ears externally intact  LYMPH: no obvious lympadenopathy in neck. RESP: Respiratory effort was normal with no retractions or use of accessory muscles.   CV:  No pedal edema  GI/ Abdomen: Soft, nondistended, nontender, no guarding, no peritoneal signs  MSK: no clubbing/ no cyanosis/ gaitnormal       Assessment/Plan:  Necrotizing pancreatitis with acute necrotic collections s/p cholecystectomy 8/21  - continue 30g of fat daily  - continue creon  - check fecal fat secondary to increased flatulence while on creon  - MRI to evaluate the above    Thank you for the consultation allowing me to take part in Mr. Mccullough's care.      Alex Aburto M.D.  10/26/2021  8:56 AM

## 2021-10-28 ENCOUNTER — TELEPHONE (OUTPATIENT)
Dept: HEMATOLOGY | Age: 71
End: 2021-10-28

## 2021-10-28 LAB
FECAL NEUTRAL FAT: NORMAL
FECAL SPLIT FATS: NORMAL

## 2021-10-28 NOTE — TELEPHONE ENCOUNTER
The patient was worried about doing a mri due to anxiety and claustrophobia, so we decided to send the patient to [facility]. I faxed all the info to them. I told the patient that they would reach out to him and schedule his mri, and for him to call our office so we can get him scheduled for a follow up and that the scan would need to be put on a disc.  The patient verbalized understanding  Electronically signed by Stephan Monzon MA on 10/28/2021 at 9:05 AM

## 2021-11-01 ENCOUNTER — TELEPHONE (OUTPATIENT)
Dept: HEMATOLOGY | Age: 71
End: 2021-11-01

## 2021-11-01 NOTE — TELEPHONE ENCOUNTER
Patient dropped off MRI disc to our office and I called him back to make him a follow up appt. Appt made for 11/11/21 at 1:00pm at [facility] at [facility]. He confirmed this appt.     Electronically signed by Joaquín Romano RN on 11/1/2021 at 12:05 PM

## 2021-11-03 DIAGNOSIS — K85.11 ACUTE BILIARY PANCREATITIS WITH UNINFECTED NECROSIS: ICD-10-CM

## 2021-11-03 DIAGNOSIS — K86.3 PANCREATIC PSEUDOCYST: ICD-10-CM

## 2021-11-08 ENCOUNTER — OFFICE VISIT (OUTPATIENT)
Dept: FAMILY MEDICINE CLINIC | Age: 71
End: 2021-11-08
Payer: MEDICARE

## 2021-11-08 VITALS
OXYGEN SATURATION: 98 % | WEIGHT: 171.2 LBS | HEART RATE: 98 BPM | BODY MASS INDEX: 23.88 KG/M2 | TEMPERATURE: 97.5 F | SYSTOLIC BLOOD PRESSURE: 136 MMHG | DIASTOLIC BLOOD PRESSURE: 82 MMHG

## 2021-11-08 DIAGNOSIS — B02.9 HERPES ZOSTER WITHOUT COMPLICATION: Primary | ICD-10-CM

## 2021-11-08 PROCEDURE — 99213 OFFICE O/P EST LOW 20 MIN: CPT | Performed by: FAMILY MEDICINE

## 2021-11-08 PROCEDURE — 96372 THER/PROPH/DIAG INJ SC/IM: CPT | Performed by: FAMILY MEDICINE

## 2021-11-08 RX ORDER — METHYLPREDNISOLONE ACETATE 40 MG/ML
40 INJECTION, SUSPENSION INTRA-ARTICULAR; INTRALESIONAL; INTRAMUSCULAR; SOFT TISSUE ONCE
Status: COMPLETED | OUTPATIENT
Start: 2021-11-08 | End: 2021-11-08

## 2021-11-08 RX ORDER — FAMCICLOVIR 500 MG/1
500 TABLET, FILM COATED ORAL 3 TIMES DAILY
Qty: 21 TABLET | Refills: 0 | Status: SHIPPED | OUTPATIENT
Start: 2021-11-08 | End: 2021-11-15

## 2021-11-08 RX ADMIN — METHYLPREDNISOLONE ACETATE 40 MG: 40 INJECTION, SUSPENSION INTRA-ARTICULAR; INTRALESIONAL; INTRAMUSCULAR; SOFT TISSUE at 10:45

## 2021-11-08 ASSESSMENT — ENCOUNTER SYMPTOMS
BLOOD IN STOOL: 0
EYE REDNESS: 0
CHEST TIGHTNESS: 0
WHEEZING: 0
SHORTNESS OF BREATH: 0
ABDOMINAL DISTENTION: 0
VOMITING: 0
SINUS PAIN: 1

## 2021-11-08 NOTE — PROGRESS NOTES
OFFICE NOTE    11/8/21  Name: Maureen Amin  DPW:[birth year]   Sex:male   Age:71 y.o. SUBJECTIVE  Chief Complaint   Patient presents with    Rash     friday, gallbladder removed 9/8, around incision sites, itchy       HPI lost about 30 lbs had gallstone pancreatitis, doing much better. Interim mayor of [place]    Review of Systems   Constitutional: Positive for fatigue. HENT: Positive for congestion and sinus pain. Eyes: Negative for redness and visual disturbance. Respiratory: Negative for chest tightness, shortness of breath and wheezing. Cardiovascular: Negative for chest pain and palpitations. Gastrointestinal: Negative for abdominal distention, blood in stool and vomiting. Endocrine: Negative for polydipsia and polyuria. Genitourinary: Negative for hematuria and urgency. Skin: Positive for rash. Negative for pallor. Neurological: Negative for tremors, seizures, syncope and numbness. Psychiatric/Behavioral: Negative for dysphoric mood. All other systems reviewed and are negative.            Current Outpatient Medications:     famciclovir (FAMVIR) 500 MG tablet, Take 1 tablet by mouth 3 times daily for 7 days, Disp: 21 tablet, Rfl: 0    lipase-protease-amylase (CREON) 09265-060725 units CPEP delayed release capsule, Take 2 capsules by mouth 5 times daily, Disp: 250 capsule, Rfl: 5    acetaminophen (APAP EXTRA STRENGTH) 500 MG tablet, Take 1 tablet by mouth every 6 hours for 14 days, Disp: 56 tablet, Rfl: 0    ibuprofen (ADVIL;MOTRIN) 800 MG tablet, Take 1 tablet by mouth 3 times daily (with meals) for 14 days, Disp: 42 tablet, Rfl: 0    lipase-protease-amylase (CREON) 28191-619082 units CPEP delayed release capsule, Take 2 capsules by mouth 3 times daily (before meals), Disp: 250 capsule, Rfl: 5    omeprazole (PRILOSEC) 40 MG delayed release capsule, Take 1 capsule by mouth every morning (before breakfast) (Patient taking differently: Take 40 mg by mouth daily ), Disp: 90 capsule, Rfl: 1    amLODIPine (NORVASC) 5 MG tablet, Take 1 tablet by mouth daily (Patient taking differently: Take 5 mg by mouth nightly ), Disp: 90 tablet, Rfl: 1    lisinopril-hydroCHLOROthiazide (PRINZIDE;ZESTORETIC) 20-12.5 MG per tablet, take 1 tablet by mouth every morning, Disp: 90 tablet, Rfl: 1    Cholecalciferol (VITAMIN D3) 50 MCG (2000 UT) CAPS, Take by mouth, Disp: , Rfl:     Zinc Sulfate (ZINC 15 PO), Take by mouth, Disp: , Rfl:     Multiple Vitamins-Minerals (THERAPEUTIC MULTIVITAMIN-MINERALS) tablet, Take 1 tablet by mouth daily, Disp: , Rfl:     aspirin 81 MG EC tablet, Take 81 mg by mouth daily, Disp: , Rfl:   Allergies   Allergen Reactions    Covid-19 (Mrna) Vaccine Swelling     Throat swelling    Penicillins        Past Medical History:   Diagnosis Date    Colon polyps     DJD (degenerative joint disease)     GERD (gastroesophageal reflux disease)     peptic reflux disease    Headache     Hyperlipidemia     Hypertension     Necrotizing pancreatitis 09/2021    Osteoarthritis      Past Surgical History:   Procedure Laterality Date    CHOLECYSTECTOMY, LAPAROSCOPIC N/A 9/8/2021    LAP ROBOTIC CHOLECYSTECTOMY WITH INTRAOPERATIVE ULTRASOUND performed by Larry Menendez MD at [place] [address] COLONOSCOPY      ENDOSCOPY, COLON, DIAGNOSTIC      KNEE CARTILAGE SURGERY      UPPER GASTROINTESTINAL ENDOSCOPY N/A 8/9/2021    EGD ESOPHAGOGASTRODUODENOSCOPY ULTRASOUND performed by Emy Butler MD at [facility] ENDOSCOPY     No family history on file.   Social History     Tobacco History     Smoking Status  Never Smoker    Smokeless Tobacco Use  Never Used          Alcohol History     Alcohol Use Status  Yes Comment  3/4 times a week          Drug Use     Drug Use Status  Never          Sexual Activity     Sexually Active  Not Asked                OBJECTIVE  Vitals:    11/08/21 1008   BP: 136/82   Pulse: 98   Temp: 97.5 °F (36.4 °C)   SpO2: 98%   Weight: 171 lb 3.2 oz (77.7 kg) Body mass index is 23.88 kg/m². No orders of the defined types were placed in this encounter. EXAM   Physical Exam  Vitals and nursing note reviewed. Constitutional:       Appearance: Normal appearance. Cardiovascular:      Rate and Rhythm: Normal rate and regular rhythm. Heart sounds: Murmur heard. Pulmonary:      Effort: Pulmonary effort is normal.      Breath sounds: No wheezing or rales. Abdominal:      General: Bowel sounds are normal.   Lymphadenopathy:      Cervical: No cervical adenopathy. Skin:     Coloration: Skin is not jaundiced. Findings: Rash present. No bruising. Comments: Rash herpetic, not scabbed. Right intercostal one of the lower thoracic dermatomes   Neurological:      General: No focal deficit present. Mental Status: He is alert and oriented to person, place, and time. Psychiatric:         Mood and Affect: Mood normal.         Behavior: Behavior normal.           Tia Salguero was seen today for rash. Diagnoses and all orders for this visit:    Herpes zoster without complication  -     methylPREDNISolone acetate (DEPO-MEDROL) injection 40 mg    Other orders  -     famciclovir (FAMVIR) 500 MG tablet; Take 1 tablet by mouth 3 times daily for 7 days    Discussed post herpetic neuralgia. No follow-ups on file.     Electronically signed by Connie Bourgeois MD on 11/8/21 at 10:19 AM EST

## 2021-11-11 ENCOUNTER — OFFICE VISIT (OUTPATIENT)
Dept: HEMATOLOGY | Age: 71
End: 2021-11-11
Payer: MEDICARE

## 2021-11-11 VITALS
OXYGEN SATURATION: 98 % | DIASTOLIC BLOOD PRESSURE: 84 MMHG | WEIGHT: 170 LBS | HEART RATE: 89 BPM | TEMPERATURE: 98.4 F | BODY MASS INDEX: 23.8 KG/M2 | RESPIRATION RATE: 16 BRPM | HEIGHT: 71 IN | SYSTOLIC BLOOD PRESSURE: 152 MMHG

## 2021-11-11 DIAGNOSIS — K86.89 PANCREATIC MASS: Primary | ICD-10-CM

## 2021-11-11 DIAGNOSIS — K86.3 PANCREATIC PSEUDOCYST: ICD-10-CM

## 2021-11-11 PROCEDURE — 99024 POSTOP FOLLOW-UP VISIT: CPT | Performed by: TRANSPLANT SURGERY

## 2021-11-11 PROCEDURE — 99212 OFFICE O/P EST SF 10 MIN: CPT | Performed by: TRANSPLANT SURGERY

## 2021-11-11 NOTE — PROGRESS NOTES
Hepatobiliary and Pancreatic Surgery Attending History and Physical    Patient's Name/Date of Birth: Jose Claudio /1950 (89 y.o.)    Date: November 11, 2021     CC:Pancreatic cyst    HPI:  Patient is a very pleasant 70year old male whom states that he had [place] in November and had some abdominal pain. He was diagnosed with acute necrotizing pancreatitis. His EUS showed 2.8 cm cystic mass at the pancreatic head with persistent pancreatic and peripancreatic inflammation consistent with pancreatitis and pancreatic pseudocyst.  The pancreatic duct was mildly dilated to 2.5 mm throughout the pancreas. He had a repeat triphasic CT scan which showed pancreatic head necrosis 9/21. He underwent a laparoscopic robotic cholecystectomy which showed chronic cholecystitis with cholelithiasis. He is sticking to a low fat diet and is getting his energy back. He is tolerating 30g of fat daily and is taking his creon. He fatigue has subsided. He had repeat MRI and is seeing me in follow up. Physical Exam:  BP (!) 152/84   Pulse 89   Temp 98.4 °F (36.9 °C) (Temporal)   Resp 16   Ht 5' 11\" (1.803 m)   Wt 170 lb (77.1 kg)   SpO2 98%   BMI 23.71 kg/m²     PSYCH: mood and affect normal, alert and oriented x 3: No apparent distress, comfortable  EYES: Sclera white, pupils equal round and reactive to light  ENMT:  Hearing normal, trachea midline, ears externally intact  LYMPH: no obvious lympadenopathy in neck. RESP: Respiratory effort was normal with no retractions or use of accessory muscles.   CV:  No pedal edema  GI/ Abdomen: Soft, nondistended, nontender, no guarding, no peritoneal signs  MSK: no clubbing/ no cyanosis/ gaitnormal       Assessment/Plan:  Necrotizing pancreatitis with acute necrotic collections s/p cholecystectomy 8/21 and with pancreatic head mass   - increase to a normal diet  - continue creon  - repeat EUS due 3cm panc head mass - likely WON    Thank you for the consultation allowing me to take part in Mr. Mccullough's care.      Zachariah Sierra M.D.  11/11/2021  1:07 PM

## 2021-11-15 ENCOUNTER — TELEPHONE (OUTPATIENT)
Dept: SURGERY | Age: 71
End: 2021-11-15

## 2021-11-15 NOTE — TELEPHONE ENCOUNTER
Per the order of Dr. Marc Calloway, patient has been scheduled for EUS with biopsy on 11.29.2021. Patient provided with procedure information over the phone and informed that written information will also be mailed to him. Patient instructed to please contact our office with any questions. Patient to follow up with Dr. Stock Come scheduling form faxed to [facility] surgery scheduling and fax confirmation received. Dr. Marc Calloway to enter orders.     Electronically signed by Walker Ratliff on 11/15/21 at 11:31 AM EST

## 2021-11-15 NOTE — TELEPHONE ENCOUNTER
Prior Authorization Form:      DEMOGRAPHICS:                     Patient Name:  Brigid Drew  Patient :  1950            Insurance:  [facility]or[facility] / Plan: Janee Ramirez ESSENTIAL/PLUS / Product Type: *No Product type* /   Insurance ID Number:    Payor/Plan Subscr  Sex Relation Sub. Ins. ID Effective Group Num   1.  Georgi NINA 1950 Male Self [id number]                                    [id number]         DIAGNOSIS & PROCEDURE:                       Procedure/Operation: EUS with biopsy           CPT Code: 98482    Diagnosis:  Pancreatic mass    ICD10 Code: K86.89    Location:  [address] [place] [address]    Surgeon:  Chirs Anderson INFORMATION:                          Date: 2021    Time: TBD              Anesthesia:  MAC/TIVA                                                       Status:  Outpatient        Special Comments:         Electronically signed by Jarrett Ibrahim on 11/15/2021 at 11:31 AM

## 2021-11-19 ENCOUNTER — OFFICE VISIT (OUTPATIENT)
Dept: FAMILY MEDICINE CLINIC | Age: 71
End: 2021-11-19
Payer: MEDICARE

## 2021-11-19 VITALS
TEMPERATURE: 97.9 F | DIASTOLIC BLOOD PRESSURE: 92 MMHG | SYSTOLIC BLOOD PRESSURE: 150 MMHG | BODY MASS INDEX: 24.22 KG/M2 | HEART RATE: 78 BPM | OXYGEN SATURATION: 97 % | WEIGHT: 173 LBS | RESPIRATION RATE: 18 BRPM | HEIGHT: 71 IN

## 2021-11-19 DIAGNOSIS — Z12.5 SCREENING FOR PROSTATE CANCER: ICD-10-CM

## 2021-11-19 DIAGNOSIS — E55.9 VITAMIN D INSUFFICIENCY: ICD-10-CM

## 2021-11-19 DIAGNOSIS — R73.01 IMPAIRED FASTING GLUCOSE: ICD-10-CM

## 2021-11-19 DIAGNOSIS — K21.9 GERD WITHOUT ESOPHAGITIS: ICD-10-CM

## 2021-11-19 DIAGNOSIS — Z00.00 ROUTINE GENERAL MEDICAL EXAMINATION AT A HEALTH CARE FACILITY: Primary | ICD-10-CM

## 2021-11-19 DIAGNOSIS — Z23 NEED FOR INFLUENZA VACCINATION: ICD-10-CM

## 2021-11-19 DIAGNOSIS — I10 BENIGN ESSENTIAL HYPERTENSION: ICD-10-CM

## 2021-11-19 DIAGNOSIS — B02.29 POSTHERPETIC NEURALGIA: ICD-10-CM

## 2021-11-19 PROCEDURE — G0008 ADMIN INFLUENZA VIRUS VAC: HCPCS | Performed by: FAMILY MEDICINE

## 2021-11-19 PROCEDURE — 90694 VACC AIIV4 NO PRSRV 0.5ML IM: CPT | Performed by: FAMILY MEDICINE

## 2021-11-19 PROCEDURE — G0438 PPPS, INITIAL VISIT: HCPCS | Performed by: FAMILY MEDICINE

## 2021-11-19 RX ORDER — LISINOPRIL AND HYDROCHLOROTHIAZIDE 20; 12.5 MG/1; MG/1
TABLET ORAL
Qty: 90 TABLET | Refills: 1 | Status: SHIPPED
Start: 2021-11-19 | End: 2022-05-17 | Stop reason: SDUPTHER

## 2021-11-19 RX ORDER — AMLODIPINE BESYLATE 5 MG/1
5 TABLET ORAL NIGHTLY
Qty: 90 TABLET | Refills: 1 | Status: SHIPPED
Start: 2021-11-19 | End: 2022-06-03 | Stop reason: SDUPTHER

## 2021-11-19 RX ORDER — GABAPENTIN 100 MG/1
100 CAPSULE ORAL NIGHTLY
Qty: 90 CAPSULE | Refills: 1 | Status: SHIPPED
Start: 2021-11-19 | End: 2022-06-03

## 2021-11-19 RX ORDER — OMEPRAZOLE 40 MG/1
40 CAPSULE, DELAYED RELEASE ORAL
Qty: 90 CAPSULE | Refills: 1 | Status: SHIPPED
Start: 2021-11-19 | End: 2022-05-17 | Stop reason: SDUPTHER

## 2021-11-19 ASSESSMENT — PATIENT HEALTH QUESTIONNAIRE - PHQ9
2. FEELING DOWN, DEPRESSED OR HOPELESS: 0
SUM OF ALL RESPONSES TO PHQ9 QUESTIONS 1 & 2: 0
SUM OF ALL RESPONSES TO PHQ QUESTIONS 1-9: 0
1. LITTLE INTEREST OR PLEASURE IN DOING THINGS: 0
SUM OF ALL RESPONSES TO PHQ QUESTIONS 1-9: 0
SUM OF ALL RESPONSES TO PHQ QUESTIONS 1-9: 0

## 2021-11-19 ASSESSMENT — LIFESTYLE VARIABLES: HOW OFTEN DO YOU HAVE A DRINK CONTAINING ALCOHOL: 0

## 2021-11-19 NOTE — PROGRESS NOTES
Medicare Annual Wellness Visit  Name: Amish Model Date: 2021   MRN: [id number] Sex: Male   Age: 70 y.o. Ethnicity: Non- / Non    : 1950 Race: White (non-)      Sarah Hamilton is here for Medicare AWV and Hypertension    Screenings for behavioral, psychosocial and functional/safety risks, and cognitive dysfunction are all negative except as indicated below. These results, as well as other patient data from the [address] form, are documented in Flowsheets linked to this Encounter. Allergies   Allergen Reactions    Covid-19 (Mrna) Vaccine Swelling     Throat swelling    Penicillins          Prior to Visit Medications    Medication Sig Taking? Authorizing Provider   omeprazole (PRILOSEC) 40 MG delayed release capsule Take 1 capsule by mouth every morning (before breakfast) Yes Debo Abarca DO   lisinopril-hydroCHLOROthiazide (PRINZIDE;ZESTORETIC) 20-12.5 MG per tablet take 1 tablet by mouth every morning Yes Debo Abarca DO   amLODIPine (NORVASC) 5 MG tablet Take 1 tablet by mouth nightly Yes Debo Abarca DO   gabapentin (NEURONTIN) 100 MG capsule Take 1 capsule by mouth nightly for 90 days.  Intended supply: 30 days Yes Peyman Coleman,    lipase-protease-amylase (CREON) 45723-106170 units CPEP delayed release capsule Take 2 capsules by mouth 5 times daily Yes Agustin Pate III, MD   acetaminophen (APAP EXTRA STRENGTH) 500 MG tablet Take 1 tablet by mouth every 6 hours for 14 days Yes Agustin Pate III, MD   ibuprofen (ADVIL;MOTRIN) 800 MG tablet Take 1 tablet by mouth 3 times daily (with meals) for 14 days Yes Agustin Pate III, MD   Cholecalciferol (VITAMIN D3) 50 MCG (2000 UT) CAPS Take by mouth Yes Historical Provider, MD   Zinc Sulfate (ZINC 15 PO) Take by mouth Yes Historical Provider, MD   Multiple Vitamins-Minerals (THERAPEUTIC MULTIVITAMIN-MINERALS) tablet Take 1 tablet by mouth daily Yes Comments: Wearing mask  Eyes:      General: Lids are normal. No scleral icterus. Extraocular Movements: Extraocular movements intact. Conjunctiva/sclera: Conjunctivae normal.   Neck:      Thyroid: No thyromegaly. Vascular: No carotid bruit. Cardiovascular:      Rate and Rhythm: Normal rate and regular rhythm. Heart sounds: Normal heart sounds. No murmur heard. Pulmonary:      Effort: Pulmonary effort is normal. No respiratory distress. Breath sounds: Normal breath sounds. No wheezing. Musculoskeletal:         General: No tenderness or deformity. Normal range of motion. Cervical back: Normal range of motion and neck supple. Right lower leg: No edema. Left lower leg: No edema. Lymphadenopathy:      Cervical: No cervical adenopathy. Skin:     General: Skin is warm and dry. Findings: Rash (crusted lesions from previous herpetic rash on L flank) present. Neurological:      General: No focal deficit present. Mental Status: He is alert and oriented to person, place, and time. Gait: Gait normal.   Psychiatric:         Mood and Affect: Mood and affect normal.         Speech: Speech normal.         Behavior: Behavior normal.         Thought Content: Thought content normal.         Patient's complete Health Risk Assessment and screening values have been reviewed and are found in Flowsheets. The following problems were reviewed today and where indicated follow up appointments were made and/or referrals ordered. Positive Risk Factor Screenings with Interventions:               No Positive Risk Factors identified today.     Personalized Preventive Plan   Current Health Maintenance Status  Immunization History   Administered Date(s) Administered    FRANCES-Emily, Morocho Peter, PF, 30mcg/0.3mL 02/12/2021    Influenza A (H3V7-60) Vaccine PF IM 11/18/2009    Influenza Virus Vaccine 10/29/2015, 10/14/2016, 10/10/2017    Influenza, High Dose (Fluzone 65 yrs and older) 10/12/2018    Influenza, Quadv, adjuvanted, 65 yrs +, IM, PF (Fluad) 10/08/2020, 11/19/2021    Influenza, Triv, inactivated, subunit, adjuvanted, IM (Fluad 65 yrs and older) 10/11/2019    Pneumococcal Conjugate 13-valent ([id number]) 10/24/2016    Pneumococcal Polysaccharide ([id number]) 10/29/2015        Health Maintenance   Topic Date Due    Annual Wellness Visit (AWV)  Never done    COVID-19 Vaccine (2 - Pfizer 3-dose booster series) 05/17/2022 (Originally 3/5/2021)    Shingles Vaccine (1 of 2) 07/01/2022 (Originally 2/20/2000)    DTaP/Tdap/Td vaccine (1 - Tdap) 09/02/2022 (Originally 2/20/1969)    Potassium monitoring  09/08/2022    Creatinine monitoring  09/08/2022    Colon cancer screen colonoscopy  12/04/2022    Lipid screen  06/02/2026    Flu vaccine  Completed    Pneumococcal 65+ years Vaccine  Completed    Hepatitis A vaccine  Aged Out    Hepatitis B vaccine  Aged Out    Hib vaccine  Aged Out    Meningococcal (ACWY) vaccine  Aged Out    Hepatitis C screen  Discontinued     Recommendations for Medabil Due: see orders and patient instructions/AVS.    Recommended screening schedule for the next 5-10 years is provided to the patient in written form: see Patient Instructions/AVS.      Assessment and Plan  Camila Bah was seen today for medicare awv and hypertension. Diagnoses and all orders for this visit:    Routine general medical examination at a health care facility  HRA reviewed and addressed. UTD on HM. Flu shot today. Due for labs. Benign essential hypertension  -     lisinopril-hydroCHLOROthiazide (PRINZIDE;ZESTORETIC) 20-12.5 MG per tablet; take 1 tablet by mouth every morning  -     amLODIPine (NORVASC) 5 MG tablet; Take 1 tablet by mouth nightly  -     CBC Auto Differential; Future  -     Comprehensive Metabolic Panel; Future  -     Lipid Panel; Future  -     TSH without Reflex; Future  -     Urinalysis; Future  Elevated today and last week.   Still dealing with pain from Shingles. Has cuff at home to monitor. Check labs. Postherpetic neuralgia  -     gabapentin (NEURONTIN) 100 MG capsule; Take 1 capsule by mouth nightly for 90 days. Intended supply: 30 days  Add Gabapentin at night for pain. Discussed Shingrix. GERD without esophagitis  -     omeprazole (PRILOSEC) 40 MG delayed release capsule; Take 1 capsule by mouth every morning (before breakfast)    Impaired fasting glucose  -     Hemoglobin A1C; Future    Vitamin D insufficiency  -     Vitamin D 25 Hydroxy; Future    Screening for prostate cancer  -     PSA screening; Future    Need for influenza vaccination  -     INFLUENZA, QUADV, ADJUVANTED, 65 YRS =, IM, PF, PREFILL SYR, 0.5ML (FLUAD)      Return in about 6 months (around 5/19/2022), or if symptoms worsen or fail to improve, for Chronic medical conditions.       Seen By:  Opal Heredia, DO

## 2021-11-19 NOTE — PATIENT INSTRUCTIONS
Personalized Preventive Plan for Jose Garcia - 11/19/2021  Medicare offers a range of preventive health benefits. Some of the tests and screenings are paid in full while other may be subject to a deductible, co-insurance, and/or copay. Some of these benefits include a comprehensive review of your medical history including lifestyle, illnesses that may run in your family, and various assessments and screenings as appropriate. After reviewing your medical record and screening and assessments performed today your provider may have ordered immunizations, labs, imaging, and/or referrals for you. A list of these orders (if applicable) as well as your Preventive Care list are included within your After Visit Summary for your review. Other Preventive Recommendations:    · A preventive eye exam performed by an eye specialist is recommended every 1-2 years to screen for glaucoma; cataracts, macular degeneration, and other eye disorders. · A preventive dental visit is recommended every 6 months. · Try to get at least 150 minutes of exercise per week or 10,000 steps per day on a pedometer . · Order or download the FREE \"Exercise & Physical Activity: Your Everyday Guide\" from [employer]. Call 6-354.897.1630 or search [employer] online. · You need 6996-7502 mg of calcium and 8512-3344 IU of vitamin D per day. It is possible to meet your calcium requirement with diet alone, but a vitamin D supplement is usually necessary to meet this goal.  · When exposed to the sun, use a sunscreen that protects against both UVA and UVB radiation with an SPF of 30 or greater. Reapply every 2 to 3 hours or after sweating, drying off with a towel, or swimming. · Always wear a seat belt when traveling in a car. Always wear a helmet when riding a bicycle or motorcycle.

## 2021-11-23 DIAGNOSIS — R73.01 IMPAIRED FASTING GLUCOSE: ICD-10-CM

## 2021-11-23 DIAGNOSIS — E55.9 VITAMIN D INSUFFICIENCY: ICD-10-CM

## 2021-11-23 DIAGNOSIS — Z12.5 SCREENING FOR PROSTATE CANCER: ICD-10-CM

## 2021-11-23 DIAGNOSIS — I10 BENIGN ESSENTIAL HYPERTENSION: ICD-10-CM

## 2021-11-23 LAB
ALBUMIN SERPL-MCNC: 4.2 G/DL (ref 3.5–5.2)
ALP BLD-CCNC: 109 U/L (ref 40–129)
ALT SERPL-CCNC: 37 U/L (ref 0–40)
ANION GAP SERPL CALCULATED.3IONS-SCNC: 16 MMOL/L (ref 7–16)
AST SERPL-CCNC: 32 U/L (ref 0–39)
BASOPHILS ABSOLUTE: 0.03 E9/L (ref 0–0.2)
BASOPHILS RELATIVE PERCENT: 0.6 % (ref 0–2)
BILIRUB SERPL-MCNC: 0.3 MG/DL (ref 0–1.2)
BILIRUBIN URINE: NEGATIVE
BLOOD, URINE: NEGATIVE
BUN BLDV-MCNC: 19 MG/DL (ref 6–23)
CALCIUM SERPL-MCNC: 9.5 MG/DL (ref 8.6–10.2)
CHLORIDE BLD-SCNC: 100 MMOL/L (ref 98–107)
CHOLESTEROL, TOTAL: 160 MG/DL (ref 0–199)
CLARITY: CLEAR
CO2: 22 MMOL/L (ref 22–29)
COLOR: YELLOW
CREAT SERPL-MCNC: 0.9 MG/DL (ref 0.7–1.2)
EOSINOPHILS ABSOLUTE: 0.06 E9/L (ref 0.05–0.5)
EOSINOPHILS RELATIVE PERCENT: 1.1 % (ref 0–6)
GFR AFRICAN AMERICAN: >60
GFR NON-AFRICAN AMERICAN: >60 ML/MIN/1.73
GLUCOSE BLD-MCNC: 116 MG/DL (ref 74–99)
GLUCOSE URINE: NEGATIVE MG/DL
HBA1C MFR BLD: 5.5 % (ref 4–5.6)
HCT VFR BLD CALC: 42.1 % (ref 37–54)
HDLC SERPL-MCNC: 50 MG/DL
HEMOGLOBIN: 13.9 G/DL (ref 12.5–16.5)
IMMATURE GRANULOCYTES #: 0.01 E9/L
IMMATURE GRANULOCYTES %: 0.2 % (ref 0–5)
KETONES, URINE: NEGATIVE MG/DL
LDL CHOLESTEROL CALCULATED: 92 MG/DL (ref 0–99)
LEUKOCYTE ESTERASE, URINE: NEGATIVE
LYMPHOCYTES ABSOLUTE: 0.8 E9/L (ref 1.5–4)
LYMPHOCYTES RELATIVE PERCENT: 15.2 % (ref 20–42)
MCH RBC QN AUTO: 31.7 PG (ref 26–35)
MCHC RBC AUTO-ENTMCNC: 33 % (ref 32–34.5)
MCV RBC AUTO: 96.1 FL (ref 80–99.9)
MONOCYTES ABSOLUTE: 0.42 E9/L (ref 0.1–0.95)
MONOCYTES RELATIVE PERCENT: 8 % (ref 2–12)
NEUTROPHILS ABSOLUTE: 3.93 E9/L (ref 1.8–7.3)
NEUTROPHILS RELATIVE PERCENT: 74.9 % (ref 43–80)
NITRITE, URINE: NEGATIVE
PDW BLD-RTO: 14 FL (ref 11.5–15)
PH UA: 6.5 (ref 5–9)
PLATELET # BLD: 107 E9/L (ref 130–450)
PMV BLD AUTO: 10.3 FL (ref 7–12)
POTASSIUM SERPL-SCNC: 4 MMOL/L (ref 3.5–5)
PROSTATE SPECIFIC ANTIGEN: 0.85 NG/ML (ref 0–4)
PROTEIN UA: NEGATIVE MG/DL
RBC # BLD: 4.38 E12/L (ref 3.8–5.8)
SODIUM BLD-SCNC: 138 MMOL/L (ref 132–146)
SPECIFIC GRAVITY UA: 1.01 (ref 1–1.03)
TOTAL PROTEIN: 6.9 G/DL (ref 6.4–8.3)
TRIGL SERPL-MCNC: 90 MG/DL (ref 0–149)
TSH SERPL DL<=0.05 MIU/L-ACNC: 1.01 UIU/ML (ref 0.27–4.2)
UROBILINOGEN, URINE: 0.2 E.U./DL
VITAMIN D 25-HYDROXY: 37 NG/ML (ref 30–100)
VLDLC SERPL CALC-MCNC: 18 MG/DL
WBC # BLD: 5.3 E9/L (ref 4.5–11.5)

## 2021-11-24 ENCOUNTER — HOSPITAL ENCOUNTER (OUTPATIENT)
Age: 71
Discharge: HOME | End: 2021-11-26
Payer: MEDICARE

## 2021-11-24 DIAGNOSIS — Z01.818 PREOP TESTING: ICD-10-CM

## 2021-11-24 PROCEDURE — U0005 INFEC AGEN DETEC AMPLI PROBE: HCPCS

## 2021-11-24 PROCEDURE — U0003 INFECTIOUS AGENT DETECTION BY NUCLEIC ACID (DNA OR RNA); SEVERE ACUTE RESPIRATORY SYNDROME CORONAVIRUS 2 (SARS-COV-2) (CORONAVIRUS DISEASE [COVID-19]), AMPLIFIED PROBE TECHNIQUE, MAKING USE OF HIGH THROUGHPUT TECHNOLOGIES AS DESCRIBED BY CMS-2020-01-R: HCPCS

## 2021-11-24 NOTE — PROGRESS NOTES
[address] [facility]                                                                                                                    PRE OP INSTRUCTIONS FOR  Harrison Cartagena        Date: 11/24/2021    Date of surgery: 11/29/21   Arrival Time: Hospital will call you between 5pm and 7pm with your final arrival time for surgery    1. Do not eat or drink anything after midnight prior to surgery. This includes no water, chewing gum, mints or ice chips. 2. Take the following medications with a small sip of water on the morning of Surgery: none    3. Diabetics may take evening dose of insulin but none after midnight. If you feel symptomatic or low blood sugar morning of surgery drink 1-2 ounces of apple juice only. 4. Aspirin, Ibuprofen, Advil, Naproxen, Vitamin E and other Anti-inflammatory products should be stopped  before surgery  as directed by your physician. Take Tylenol only unless instructed otherwise by your surgeon. 5. Check with your Doctor regarding stopping Plavix, Coumadin, Lovenox, Eliquis, Effient, or other blood thinners. 6. Do not smoke,use illicit drugs and do not drink any alcoholic beverages 24 hours prior to surgery. 7. You may brush your teeth the morning of surgery. DO NOT SWALLOW WATER    8. You MUST make arrangements for a responsible adult to take you home after your surgery. You will not be allowed to leave alone or drive yourself home. It is strongly suggested someone stay with you the first 24 hrs. Your surgery will be cancelled if you do not have a ride home. 9. PEDIATRIC PATIENTS ONLY:  A parent/legal guardian must accompany a child scheduled for surgery and plan to stay at the hospital until the child is discharged. Please do not bring other children with you.     10. Please wear simple, loose fitting clothing to the hospital.  Halie Hari not bring valuables (money, credit cards, checkbooks, etc.) Do not wear any makeup (including no eye makeup) or nail polish on your fingers or toes. 11. DO NOT wear any jewelry or piercings on day of surgery. All body piercing jewelry must be removed. 12. Shower the night before surgery with _x__Antibacterial soap /JODY WIPES________    13. TOTAL JOINT REPLACEMENT/HYSTERECTOMY PATIENTS ONLY---Remember to bring Blood Bank bracelet to the hospital on the day of surgery. 14. If you have a Living Will and Durable Power of  for Healthcare, please bring in a copy. 15. If appropriate bring crutches, inspirex, WALKER, CANE etc... 12. Notify your Surgeon if you develop any illness between now and surgery time, cough, cold, fever, sore throat, nausea, vomiting, etc.  Please notify your surgeon if you experience dizziness, shortness of breath or blurred vision between now & the time of your surgery. 17. If you have ___dentures, they will be removed before going to the OR; we will provide you a container. If you wear ___contact lenses or ___glasses, they will be removed; please bring a case for them. 18. To provide excellent care visitors will be limited to 1 in the room at any given time. 19. Please bring picture ID and insurance card. 20. Sleep apnea patients need to bring CPAP AND SETTINGS to hospital on day of surgery. 21. During flu season no children under the age of 15 are permitted in the hospital for the safety of all patients. 22. Other                  Please call AMBULATORY CARE if you have any further questions.    [address] [facility]     [address]

## 2021-11-25 LAB — SARS-COV-2, PCR: NOT DETECTED

## 2021-11-28 ENCOUNTER — ANESTHESIA EVENT (OUTPATIENT)
Dept: ENDOSCOPY | Age: 71
End: 2021-11-28
Payer: MEDICARE

## 2021-11-29 ENCOUNTER — HOSPITAL ENCOUNTER (OUTPATIENT)
Age: 71
Setting detail: OUTPATIENT SURGERY
Discharge: HOME | End: 2021-11-29
Attending: SURGERY | Admitting: SURGERY
Payer: MEDICARE

## 2021-11-29 ENCOUNTER — ANESTHESIA (OUTPATIENT)
Dept: ENDOSCOPY | Age: 71
End: 2021-11-29
Payer: MEDICARE

## 2021-11-29 VITALS
TEMPERATURE: 97.4 F | HEART RATE: 84 BPM | OXYGEN SATURATION: 100 % | HEIGHT: 71 IN | RESPIRATION RATE: 18 BRPM | WEIGHT: 173.8 LBS | SYSTOLIC BLOOD PRESSURE: 143 MMHG | DIASTOLIC BLOOD PRESSURE: 78 MMHG | BODY MASS INDEX: 24.33 KG/M2

## 2021-11-29 VITALS — DIASTOLIC BLOOD PRESSURE: 81 MMHG | SYSTOLIC BLOOD PRESSURE: 131 MMHG | OXYGEN SATURATION: 99 %

## 2021-11-29 DIAGNOSIS — Z01.818 PREOP TESTING: Primary | ICD-10-CM

## 2021-11-29 PROCEDURE — 3700000001 HC ADD 15 MINUTES (ANESTHESIA): Performed by: SURGERY

## 2021-11-29 PROCEDURE — 2709999900 HC NON-CHARGEABLE SUPPLY: Performed by: SURGERY

## 2021-11-29 PROCEDURE — 6360000002 HC RX W HCPCS: Performed by: NURSE ANESTHETIST, CERTIFIED REGISTERED

## 2021-11-29 PROCEDURE — 7100000011 HC PHASE II RECOVERY - ADDTL 15 MIN: Performed by: SURGERY

## 2021-11-29 PROCEDURE — 99213 OFFICE O/P EST LOW 20 MIN: CPT | Performed by: SURGERY

## 2021-11-29 PROCEDURE — 3700000000 HC ANESTHESIA ATTENDED CARE: Performed by: SURGERY

## 2021-11-29 PROCEDURE — 2720000010 HC SURG SUPPLY STERILE: Performed by: SURGERY

## 2021-11-29 PROCEDURE — 3609018500 HC EGD US SCOPE W/ADJACENT STRUCTURES: Performed by: SURGERY

## 2021-11-29 PROCEDURE — 2500000003 HC RX 250 WO HCPCS: Performed by: NURSE ANESTHETIST, CERTIFIED REGISTERED

## 2021-11-29 PROCEDURE — 2580000003 HC RX 258: Performed by: NURSE ANESTHETIST, CERTIFIED REGISTERED

## 2021-11-29 PROCEDURE — 7100000010 HC PHASE II RECOVERY - FIRST 15 MIN: Performed by: SURGERY

## 2021-11-29 RX ORDER — SODIUM CHLORIDE, SODIUM LACTATE, POTASSIUM CHLORIDE, CALCIUM CHLORIDE 600; 310; 30; 20 MG/100ML; MG/100ML; MG/100ML; MG/100ML
INJECTION, SOLUTION INTRAVENOUS CONTINUOUS PRN
Status: DISCONTINUED | OUTPATIENT
Start: 2021-11-29 | End: 2021-11-29 | Stop reason: SDUPTHER

## 2021-11-29 RX ORDER — SODIUM CHLORIDE, SODIUM LACTATE, POTASSIUM CHLORIDE, CALCIUM CHLORIDE 600; 310; 30; 20 MG/100ML; MG/100ML; MG/100ML; MG/100ML
INJECTION, SOLUTION INTRAVENOUS CONTINUOUS
Status: DISCONTINUED | OUTPATIENT
Start: 2021-11-29 | End: 2021-11-29 | Stop reason: HOSPADM

## 2021-11-29 RX ORDER — SODIUM CHLORIDE 0.9 % (FLUSH) 0.9 %
5-40 SYRINGE (ML) INJECTION PRN
Status: DISCONTINUED | OUTPATIENT
Start: 2021-11-29 | End: 2021-11-29 | Stop reason: HOSPADM

## 2021-11-29 RX ORDER — PROPOFOL 10 MG/ML
INJECTION, EMULSION INTRAVENOUS CONTINUOUS PRN
Status: DISCONTINUED | OUTPATIENT
Start: 2021-11-29 | End: 2021-11-29 | Stop reason: SDUPTHER

## 2021-11-29 RX ORDER — SODIUM CHLORIDE 9 MG/ML
25 INJECTION, SOLUTION INTRAVENOUS PRN
Status: DISCONTINUED | OUTPATIENT
Start: 2021-11-29 | End: 2021-11-29 | Stop reason: HOSPADM

## 2021-11-29 RX ORDER — SODIUM CHLORIDE 0.9 % (FLUSH) 0.9 %
5-40 SYRINGE (ML) INJECTION EVERY 12 HOURS SCHEDULED
Status: DISCONTINUED | OUTPATIENT
Start: 2021-11-29 | End: 2021-11-29 | Stop reason: HOSPADM

## 2021-11-29 RX ORDER — LIDOCAINE HYDROCHLORIDE 20 MG/ML
INJECTION, SOLUTION INFILTRATION; PERINEURAL PRN
Status: DISCONTINUED | OUTPATIENT
Start: 2021-11-29 | End: 2021-11-29 | Stop reason: SDUPTHER

## 2021-11-29 RX ORDER — MIDAZOLAM HYDROCHLORIDE 1 MG/ML
INJECTION INTRAMUSCULAR; INTRAVENOUS PRN
Status: DISCONTINUED | OUTPATIENT
Start: 2021-11-29 | End: 2021-11-29 | Stop reason: SDUPTHER

## 2021-11-29 RX ORDER — SODIUM CHLORIDE 9 MG/ML
INJECTION, SOLUTION INTRAVENOUS CONTINUOUS PRN
Status: DISCONTINUED | OUTPATIENT
Start: 2021-11-29 | End: 2021-11-29 | Stop reason: SDUPTHER

## 2021-11-29 RX ORDER — PROPOFOL 10 MG/ML
INJECTION, EMULSION INTRAVENOUS PRN
Status: DISCONTINUED | OUTPATIENT
Start: 2021-11-29 | End: 2021-11-29 | Stop reason: SDUPTHER

## 2021-11-29 RX ADMIN — LIDOCAINE HYDROCHLORIDE 40 MG: 20 INJECTION, SOLUTION INFILTRATION; PERINEURAL at 11:13

## 2021-11-29 RX ADMIN — PROPOFOL 23 MG: 10 INJECTION, EMULSION INTRAVENOUS at 11:34

## 2021-11-29 RX ADMIN — PROPOFOL 90 MG: 10 INJECTION, EMULSION INTRAVENOUS at 11:13

## 2021-11-29 RX ADMIN — PROPOFOL 75 MCG/KG/MIN: 10 INJECTION, EMULSION INTRAVENOUS at 11:30

## 2021-11-29 RX ADMIN — PROPOFOL 100 MG: 10 INJECTION, EMULSION INTRAVENOUS at 11:30

## 2021-11-29 RX ADMIN — LIDOCAINE HYDROCHLORIDE 40 MG: 20 INJECTION, SOLUTION INFILTRATION; PERINEURAL at 11:34

## 2021-11-29 RX ADMIN — MIDAZOLAM 1 MG: 1 INJECTION INTRAMUSCULAR; INTRAVENOUS at 11:13

## 2021-11-29 RX ADMIN — SODIUM CHLORIDE: 9 INJECTION, SOLUTION INTRAVENOUS at 11:10

## 2021-11-29 NOTE — ANESTHESIA PRE PROCEDURE
Department of Anesthesiology  Preprocedure Note       Name:  Luz Whelan   Age:  70 y.o.  :  1950                                          MRN:  [id number]         Date:  2021      Surgeon: Madai Stanley):  Ramiro Machuca MD    Procedure: Procedure(s):  EGD W/EUS FNA (CPT 10250)    Medications prior to admission:   Prior to Admission medications    Medication Sig Start Date End Date Taking? Authorizing Provider   omeprazole (PRILOSEC) 40 MG delayed release capsule Take 1 capsule by mouth every morning (before breakfast) 21   Debo Abarca DO   lisinopril-hydroCHLOROthiazide (PRINZIDE;ZESTORETIC) 20-12.5 MG per tablet take 1 tablet by mouth every morning 21   Debo Abarca DO   amLODIPine (NORVASC) 5 MG tablet Take 1 tablet by mouth nightly 21  Debo Abarca DO   gabapentin (NEURONTIN) 100 MG capsule Take 1 capsule by mouth nightly for 90 days.  Intended supply: 30 days 21  Debo Abarca DO   lipase-protease-amylase (CREON) 11891-627421 units CPEP delayed release capsule Take 2 capsules by mouth 5 times daily 10/26/21   Kerry Vallejo III, MD   acetaminophen (APAP EXTRA STRENGTH) 500 MG tablet Take 1 tablet by mouth every 6 hours for 14 days 21  Kerry Vallejo III, MD   Cholecalciferol (VITAMIN D3) 50 MCG (2000 UT) CAPS Take by mouth    Historical Provider, MD   Zinc Sulfate (ZINC 15 PO) Take by mouth    Historical Provider, MD   Multiple Vitamins-Minerals (THERAPEUTIC MULTIVITAMIN-MINERALS) tablet Take 1 tablet by mouth daily    Historical Provider, MD   aspirin 81 MG EC tablet Take 81 mg by mouth daily    Historical Provider, MD       Current medications:    Current Outpatient Medications   Medication Sig Dispense Refill    omeprazole (PRILOSEC) 40 MG delayed release capsule Take 1 capsule by mouth every morning (before breakfast) 90 capsule 1    lisinopril-hydroCHLOROthiazide (PRINZIDE;ZESTORETIC) 20-12.5 MG per tablet take 1 tablet by mouth every morning 90 tablet 1    amLODIPine (NORVASC) 5 MG tablet Take 1 tablet by mouth nightly 90 tablet 1    gabapentin (NEURONTIN) 100 MG capsule Take 1 capsule by mouth nightly for 90 days. Intended supply: 30 days 90 capsule 1    lipase-protease-amylase (CREON) 79552-751864 units CPEP delayed release capsule Take 2 capsules by mouth 5 times daily 250 capsule 5    acetaminophen (APAP EXTRA STRENGTH) 500 MG tablet Take 1 tablet by mouth every 6 hours for 14 days 56 tablet 0    Cholecalciferol (VITAMIN D3) 50 MCG (2000 UT) CAPS Take by mouth      Zinc Sulfate (ZINC 15 PO) Take by mouth      Multiple Vitamins-Minerals (THERAPEUTIC MULTIVITAMIN-MINERALS) tablet Take 1 tablet by mouth daily      aspirin 81 MG EC tablet Take 81 mg by mouth daily       No current facility-administered medications for this visit. Allergies:     Allergies   Allergen Reactions    Covid-19 (Mrna) Vaccine Swelling     Throat swelling    Penicillins        Problem List:    Patient Active Problem List   Diagnosis Code    GERD without esophagitis K21.9    Benign essential hypertension I10    Hyperlipidemia E78.5    Gallstone pancreatitis K85.10    Chronic cholecystitis with calculus K80.10    Pancreatic pseudocyst K86.3    Necrotizing pancreatitis K85.91    Postherpetic neuralgia B02.29       Past Medical History:        Diagnosis Date    Colon polyps     DJD (degenerative joint disease)     GERD (gastroesophageal reflux disease)     peptic reflux disease    Headache     Hyperlipidemia     Hypertension     Necrotizing pancreatitis 09/2021    Osteoarthritis        Past Surgical History:        Procedure Laterality Date    CHOLECYSTECTOMY, LAPAROSCOPIC N/A 9/8/2021    LAP ROBOTIC CHOLECYSTECTOMY WITH INTRAOPERATIVE ULTRASOUND performed by Franco Forde MD at [facility] OR    COLONOSCOPY      ENDOSCOPY, COLON, DIAGNOSTIC      KNEE CARTILAGE SURGERY      UPPER GASTROINTESTINAL ENDOSCOPY N/A 8/9/2021    EGD ESOPHAGOGASTRODUODENOSCOPY ULTRASOUND performed by Matt Monet MD at [address] History:    Social History     Tobacco Use    Smoking status: Never Smoker    Smokeless tobacco: Never Used   Substance Use Topics    Alcohol use: Not Currently     Comment:                                  Counseling given: Not Answered      Vital Signs (Current): There were no vitals filed for this visit. BP Readings from Last 3 Encounters:   11/19/21 (!) 150/92   11/11/21 (!) 152/84   11/08/21 136/82       NPO Status:                                                                                 BMI:   Wt Readings from Last 3 Encounters:   11/19/21 173 lb (78.5 kg)   11/11/21 170 lb (77.1 kg)   11/08/21 171 lb 3.2 oz (77.7 kg)     There is no height or weight on file to calculate BMI.    CBC:   Lab Results   Component Value Date    WBC 5.3 11/23/2021    RBC 4.38 11/23/2021    HGB 13.9 11/23/2021    HCT 42.1 11/23/2021    MCV 96.1 11/23/2021    RDW 14.0 11/23/2021     11/23/2021       CMP:   Lab Results   Component Value Date     11/23/2021    K 4.0 11/23/2021     11/23/2021    CO2 22 11/23/2021    BUN 19 11/23/2021    CREATININE 0.9 11/23/2021    CREATININE 1.1 04/12/2019    GFRAA >60 11/23/2021    LABGLOM >60 11/23/2021    GLUCOSE 116 11/23/2021    PROT 6.9 11/23/2021    CALCIUM 9.5 11/23/2021    BILITOT 0.3 11/23/2021    ALKPHOS 109 11/23/2021    AST 32 11/23/2021    ALT 37 11/23/2021       POC Tests: No results for input(s): POCGLU, POCNA, POCK, POCCL, POCBUN, POCHEMO, POCHCT in the last 72 hours.     Coags: No results found for: PROTIME, INR, APTT    HCG (If Applicable): No results found for: PREGTESTUR, PREGSERUM, HCG, HCGQUANT     ABGs: No results found for: PHART, PO2ART, ILF1ZZB, VIG3EZN, BEART, N1BXEMHB     Type & Screen (If Applicable):  No results found for: LABABO, LABRH    Drug/Infectious Status (If Applicable):  No results found for: HIV, HEPCAB    COVID-19 Screening (If Applicable):   Lab Results   Component Value Date    COVID19 Not Detected 11/24/2021           Anesthesia Evaluation  Patient summary reviewed  Airway: Mallampati: III  TM distance: >3 FB   Neck ROM: full  Mouth opening: > = 3 FB Dental:      Comment: Dentition intact,missing multiple lower molars, patient denies any loose teeth. Pulmonary:Negative Pulmonary ROS and normal exam  breath sounds clear to auscultation                             Cardiovascular:    (+) hypertension:, hyperlipidemia        Rhythm: regular  Rate: normal                    Neuro/Psych:   (+) headaches:,             GI/Hepatic/Renal:   (+) GERD:,          ROS comment: Gallstone pancreatitis. Endo/Other:    (+) : arthritis: OA., .                 Abdominal:             Vascular: negative vascular ROS. Other Findings:               Anesthesia Plan      MAC     ASA 3       Induction: intravenous. Anesthetic plan and risks discussed with patient. Plan discussed with CRNA.                   Danny Cornell MD   11/28/2021    Agree with above assessment  ML Doyle - CRNA

## 2021-11-29 NOTE — H&P
General Surgery History and Physical  [facility]    Patient's Name/Date of Birth: Harleen Covarrubias / 1950    Date: November 29, 2021     Surgeon: Keith Diaz MD    PCP: Barb Ohara DO     Chief Complaint: Pancreatic cyst    HPI:   Harleen Covarrubias is a 70 y.o. male who presents for evaluation of pancreatic cyst. He recently had necrotizing pancreatitis with pancreatic walled off necrosis seen on CT scan and MRI. He underwent laparoscopic robotic assisted cholecystectomy with intraoperative ultrasound that confirmed pancreatic cyst. He has done well since surgery and now presents for follow-up of pancreatic cyst. He has never had an endoscopic ultrasound. He has no family history of pancreatic or hepatobiliary malignancy.     Patient Active Problem List   Diagnosis    GERD without esophagitis    Benign essential hypertension    Hyperlipidemia    Gallstone pancreatitis    Chronic cholecystitis with calculus    Pancreatic pseudocyst    Necrotizing pancreatitis    Postherpetic neuralgia       Past Medical History:   Diagnosis Date    Colon polyps     DJD (degenerative joint disease)     GERD (gastroesophageal reflux disease)     peptic reflux disease    Headache     Hyperlipidemia     Hypertension     Necrotizing pancreatitis 09/2021    Osteoarthritis        Past Surgical History:   Procedure Laterality Date    CHOLECYSTECTOMY, LAPAROSCOPIC N/A 9/8/2021    LAP ROBOTIC CHOLECYSTECTOMY WITH INTRAOPERATIVE ULTRASOUND performed by Misa Corbett MD at [address] COLONOSCOPY      ENDOSCOPY, COLON, DIAGNOSTIC      KNEE CARTILAGE SURGERY      UPPER GASTROINTESTINAL ENDOSCOPY N/A 8/9/2021    EGD ESOPHAGOGASTRODUODENOSCOPY ULTRASOUND performed by Keith Diaz MD at [address]   Allergen Reactions    Covid-19 (Mrna) Vaccine Swelling     Throat swelling    Penicillins        The patient has a family history that is negative for severe cardiovascular or respiratory issues, negative for reaction to anesthesia. Time spent reviewing past medical, surgical, social and family history, vitals, nursing assessment and images. No changes from above documented history. Social History     Socioeconomic History    Marital status:      Spouse name: Not on file    Number of children: Not on file    Years of education: Not on file    Highest education level: Not on file   Occupational History    Not on file   Tobacco Use    Smoking status: Never Smoker    Smokeless tobacco: Never Used   Vaping Use    Vaping Use: Never used   Substance and Sexual Activity    Alcohol use: Not Currently     Comment:      Drug use: Never    Sexual activity: Not on file   Other Topics Concern    Not on file   Social History Narrative    Not on file     Social Determinants of Health     Financial Resource Strain:     Difficulty of Paying Living Expenses: Not on file   Food Insecurity:     Worried About Running Out of Food in the Last Year: Not on file    Yamil of Food in the Last Year: Not on file   Transportation Needs:     Lack of Transportation (Medical): Not on file    Lack of Transportation (Non-Medical):  Not on file   Physical Activity:     Days of Exercise per Week: Not on file    Minutes of Exercise per Session: Not on file   Stress:     Feeling of Stress : Not on file   Social Connections:     Frequency of Communication with Friends and Family: Not on file    Frequency of Social Gatherings with Friends and Family: Not on file    Attends Oriental orthodox Services: Not on file    Active Member of Clubs or Organizations: Not on file    Attends Club or Organization Meetings: Not on file    Marital Status: Not on file   Intimate Partner Violence:     Fear of Current or Ex-Partner: Not on file    Emotionally Abused: Not on file    Physically Abused: Not on file    Sexually Abused: Not on file   Housing Stability:     Unable to Pay for Housing in the Last Year: Not on file    Number of Places Lived in the Last Year: Not on file    Unstable Housing in the Last Year: Not on file       I have reviewed relevant labs from this admission and interpretation is included in my assessment and plan    Review of Systems    A complete 10 system review was performed and are otherwise negative unless mentioned in the above HPI. Specific negatives are listed below but may not include all those reviewed. General ROS: negative obtundation, AMS  ENT ROS: negative rhinorrhea, epistaxis  Allergy and Immunology ROS: negative itchy/watery eyes or nasal congestion  Hematological and Lymphatic ROS: negative spontaneous bleeding or bruising  Endocrine ROS: negative  lethargy, mood swings, palpitations or polydipsia/polyuria  Respiratory ROS: negative sputum changes, stridor, tachypnea or wheezing  Cardiovascular ROS: negative for - loss of consciousness, murmur or orthopnea  Gastrointestinal ROS: negative for - hematochezia or hematemesis  Genito-Urinary ROS: negative for -  genital discharge or hematuria  Musculoskeletal ROS: negative for - focal weakness, gangrene  Psych/Neuro ROS: negative for - visual or auditory hallucinations, suicidal ideation    Physical exam:   /82   Pulse 78   Temp 97.8 °F (36.6 °C) (Infrared)   Resp 18   Ht 5' 11\" (1.803 m)   Wt 173 lb 12.8 oz (78.8 kg)   SpO2 100%   BMI 24.24 kg/m²   General appearance:  NAD, appears stated age  Head: NCAT, PERRLA, EOMI, red conjunctiva  Neck: supple, no masses, trachea midline  Lungs: Equal chest rise bilateral, no retractions, no wheezing  Heart: Reg rate  Abdomen: soft, nontender, nondistended  Skin; warm and dry, no cyanosis  Gu: no cva tenderness  Extremities: atraumatic, no focal motor deficits, no open wounds  Psych: No tremor, visual hallucinations      Radiology: I reviewed relevant abdominal imaging from this admission and that available in the EMR including CT abd/pel from 9/2/2021. My assessment is pancreatic head cyst    Assessment:  Vladimir Mike is a 70 y.o. male with pancreatic pseudocyst, history of necrotizing pancreatitis from gallstones  Patient Active Problem List   Diagnosis    GERD without esophagitis    Benign essential hypertension    Hyperlipidemia    Gallstone pancreatitis    Chronic cholecystitis with calculus    Pancreatic pseudocyst    Necrotizing pancreatitis    Postherpetic neuralgia         Plan:  Proceed with EUS with possible biopsy  The procedure, risks, benefits and alternatives were discussed with the patient. He agrees to proceed.         Catalina Laws MD  11:49 AM  11/29/2021

## 2021-11-29 NOTE — OP NOTE
Endoscopic Ultrasound Procedure Note    Date of Procedure: 11/29/2021    Pre-procedure Diagnosis: Pancreatic cyst    Post-procedure Diagnosis: Chronic pancreatitis with resolution of pancreatic cyst    Physician: Jaelyn Barraza MD    Assistant: None    Estimated Blood Loss: None    Anesthesia: LMAC     Complications: None    Indications and History:  The patient is a 70 y.o. male. The risks, benefits, complications, treatment options and expected outcomes were discussed with the patient. The possibilities of reaction to medication, pulmonary aspiration, perforation of the gastrointestinal tract, bleeding requiring transfusion or operation, respiratory failure requiring placement on a ventilator and failure to diagnose a condition were discussed with the patient who freely signed the consent. Description of Procedure: The patient was taken to the endoscopy suite, identified as Angelica Palmer and the procedure verified as Endoscopic Ultrasound (EUS). A Time Out was held and the above information confirmed. The patient was positioned in the left lateral position with an oral bite block and anesthesia was provided for sedation and comfort. The echoendoscope was passed to the second portion of the duodenum. EGD/EUS findings:   Esophagus: normal   Stomach: normal   Duodenum: normal   Pancreas: Lobular contour of the pancreas with starry night appearance consistent with chronic pancreatitis. No significant cystic lesion in the head of the pancreas. The pancreatic duct is normal in caliber about 2 mm in the body and tail of the pancreas. Extensive cavernous transformation of the portal vein around the pancreatic head. No solid masses. Bile Duct: normal   Gallbladder: Surgically absent      Specimens:  1. none    The Patient was taken to the Endoscopy Recovery area in satisfactory condition.       Electronically signed by Jaelyn Barraza MD on 11/29/2021 at 11:54 AM

## 2021-11-29 NOTE — ANESTHESIA POSTPROCEDURE EVALUATION
Department of Anesthesiology  Postprocedure Note    Patient: Shyanne Bower  MRN: [id number]  YOB: 1950  Date of evaluation: 11/29/2021  Time:  2:50 PM     Procedure Summary     Date: 11/29/21 Room / Location: [address] [place] [address] / [address] [place] [address]    Anesthesia Start: 1110 Anesthesia Stop: 1999    Procedure: EGD ESOPHAGOGASTRODUODENOSCOPY ULTRASOUND (N/A ) Diagnosis: (PANCREATIC MASS)    Surgeons: Matt Monet MD Responsible Provider: Shantel Gonzalez MD    Anesthesia Type: MAC ASA Status: 3          Anesthesia Type: MAC    Nicole Phase I: Nicole Score: 10    Nicole Phase II:      Last vitals: Reviewed and per EMR flowsheets.        Anesthesia Post Evaluation    Patient location during evaluation: bedside  Patient participation: complete - patient participated  Level of consciousness: awake  Pain score: 0  Airway patency: patent  Nausea & Vomiting: no nausea and no vomiting  Complications: no  Cardiovascular status: hemodynamically stable  Respiratory status: acceptable  Hydration status: euvolemic    Agree with above assessment  ML Sweet - CRNA

## 2021-12-07 ENCOUNTER — OFFICE VISIT (OUTPATIENT)
Dept: SURGERY | Age: 71
End: 2021-12-07
Payer: MEDICARE

## 2021-12-07 VITALS
WEIGHT: 177 LBS | OXYGEN SATURATION: 100 % | TEMPERATURE: 97.4 F | BODY MASS INDEX: 24.78 KG/M2 | HEART RATE: 84 BPM | SYSTOLIC BLOOD PRESSURE: 143 MMHG | HEIGHT: 71 IN | DIASTOLIC BLOOD PRESSURE: 78 MMHG

## 2021-12-07 DIAGNOSIS — K86.1 CHRONIC BILIARY PANCREATITIS (HCC): Primary | ICD-10-CM

## 2021-12-07 PROCEDURE — 99024 POSTOP FOLLOW-UP VISIT: CPT | Performed by: TRANSPLANT SURGERY

## 2021-12-07 NOTE — PROGRESS NOTES
Hepatobiliary and Pancreatic Surgery Attending History and Physical    Patient's Name/Date of Birth: Elizabeth Chapin /1950 (89 y.o.)    Date: December 7, 2021     CC:Pancreatic cyst    HPI:  Patient is a very pleasant 70year old male whom states that he had COVID in November 2020 and had some abdominal pain. He was diagnosed with acute necrotizing pancreatitis. His EUS showed 2.8 cm cystic mass at the pancreatic head with persistent pancreatic and peripancreatic inflammation consistent with pancreatitis and pancreatic pseudocyst.  The pancreatic duct was mildly dilated to 2.5 mm throughout the pancreas. He had a repeat triphasic CT scan which showed pancreatic head necrosis 9/21. He underwent a laparoscopic robotic cholecystectomy which showed chronic cholecystitis with cholelithiasis. He states that he is eating normal and whatever he wants. He denies any diarrhea and ate at a restaurant. He had an EUS which showed resolution of the cyst.  He is taking it immediately before he eats. Physical Exam:  BP (!) 143/78   Pulse 84   Temp 97.4 °F (36.3 °C)   Ht 5' 11\" (1.803 m)   Wt 177 lb (80.3 kg)   SpO2 100%   BMI 24.69 kg/m²     PSYCH: mood and affect normal, alert and oriented x 3: No apparent distress, comfortable  EYES: Sclera white, pupils equal round and reactive to light  ENMT:  Hearing normal, trachea midline, ears externally intact  LYMPH: no obvious lympadenopathy in neck. RESP: Respiratory effort was normal with no retractions or use of accessory muscles.   CV:  No pedal edema  GI/ Abdomen: Soft, nondistended, nontender, no guarding, no peritoneal signs  MSK: no clubbing/ no cyanosis/ gaitnormal       Assessment/Plan:  Chronic pancreatitis  - continue diet  - continue creon  - we discussed his lifetime risk of developing pancreatic adenocarcinoma  - EUS showed resolution of cyst  - imaging MRI in 1 year - open (may need valium)    20 Minutes of which greater than 50% was spent counseling or coordinating his care. Thank you for the consultation allowing me to take part in Mr. Mccullough's care.      Prosper Bergeron M.D.  12/7/2021  11:01 AM

## 2022-01-03 ENCOUNTER — TELEPHONE (OUTPATIENT)
Dept: HEMATOLOGY | Age: 72
End: 2022-01-03

## 2022-01-03 NOTE — TELEPHONE ENCOUNTER
The following samples were given to the patient      Creon 36,000 unit capsules    EXP 12/16/22  LOT [id number]    5 bottles of 12 capsules were given  Electronically signed by Олег Mckay MA on 1/3/2022 at 2:03 PM

## 2022-05-17 DIAGNOSIS — I10 BENIGN ESSENTIAL HYPERTENSION: ICD-10-CM

## 2022-05-17 DIAGNOSIS — K21.9 GERD WITHOUT ESOPHAGITIS: ICD-10-CM

## 2022-05-17 RX ORDER — LISINOPRIL AND HYDROCHLOROTHIAZIDE 20; 12.5 MG/1; MG/1
TABLET ORAL
Qty: 90 TABLET | Refills: 1 | Status: SHIPPED | OUTPATIENT
Start: 2022-05-17

## 2022-05-17 RX ORDER — OMEPRAZOLE 40 MG/1
40 CAPSULE, DELAYED RELEASE ORAL
Qty: 90 CAPSULE | Refills: 1 | Status: SHIPPED | OUTPATIENT
Start: 2022-05-17

## 2022-06-03 ENCOUNTER — OFFICE VISIT (OUTPATIENT)
Dept: FAMILY MEDICINE CLINIC | Age: 72
End: 2022-06-03
Payer: MEDICARE

## 2022-06-03 VITALS
WEIGHT: 193 LBS | TEMPERATURE: 98.8 F | BODY MASS INDEX: 26.92 KG/M2 | HEART RATE: 68 BPM | DIASTOLIC BLOOD PRESSURE: 70 MMHG | SYSTOLIC BLOOD PRESSURE: 124 MMHG | OXYGEN SATURATION: 98 %

## 2022-06-03 DIAGNOSIS — I10 BENIGN ESSENTIAL HYPERTENSION: Primary | ICD-10-CM

## 2022-06-03 DIAGNOSIS — K21.9 GERD WITHOUT ESOPHAGITIS: ICD-10-CM

## 2022-06-03 DIAGNOSIS — E78.2 MIXED HYPERLIPIDEMIA: ICD-10-CM

## 2022-06-03 DIAGNOSIS — R73.01 IMPAIRED FASTING GLUCOSE: ICD-10-CM

## 2022-06-03 DIAGNOSIS — E55.9 VITAMIN D INSUFFICIENCY: ICD-10-CM

## 2022-06-03 PROCEDURE — 99214 OFFICE O/P EST MOD 30 MIN: CPT | Performed by: FAMILY MEDICINE

## 2022-06-03 PROCEDURE — 1123F ACP DISCUSS/DSCN MKR DOCD: CPT | Performed by: FAMILY MEDICINE

## 2022-06-03 RX ORDER — AMLODIPINE BESYLATE 5 MG/1
5 TABLET ORAL NIGHTLY
Qty: 90 TABLET | Refills: 1 | Status: SHIPPED | OUTPATIENT
Start: 2022-06-03 | End: 2022-09-01

## 2022-06-03 RX ORDER — GABAPENTIN 100 MG/1
100 CAPSULE ORAL NIGHTLY
Qty: 90 CAPSULE | Refills: 1 | Status: CANCELLED | OUTPATIENT
Start: 2022-06-03 | End: 2022-09-01

## 2022-06-03 ASSESSMENT — ENCOUNTER SYMPTOMS
BACK PAIN: 0
ABDOMINAL PAIN: 0
SHORTNESS OF BREATH: 0
VOMITING: 0
CONSTIPATION: 0
COUGH: 0
DIARRHEA: 0
WHEEZING: 0
NAUSEA: 0

## 2022-06-03 ASSESSMENT — PATIENT HEALTH QUESTIONNAIRE - PHQ9
SUM OF ALL RESPONSES TO PHQ9 QUESTIONS 1 & 2: 0
SUM OF ALL RESPONSES TO PHQ QUESTIONS 1-9: 0
SUM OF ALL RESPONSES TO PHQ QUESTIONS 1-9: 0
2. FEELING DOWN, DEPRESSED OR HOPELESS: 0
SUM OF ALL RESPONSES TO PHQ QUESTIONS 1-9: 0
SUM OF ALL RESPONSES TO PHQ QUESTIONS 1-9: 0
1. LITTLE INTEREST OR PLEASURE IN DOING THINGS: 0

## 2022-06-03 NOTE — PROGRESS NOTES
6/3/22  Keyona Needle : 1950 Sex: male  Age: 67 y.o. Chief Complaint   Patient presents with    6 Month Follow-Up    Hypertension    Hyperlipidemia     HPI:  67 y.o. male patient presents today for 6 month(s) follow up of chronic medical conditions, medication refills and FBW. Patient's chart, medical, surgical and medication history all reviewed. Hypertension   The patient presents today for follow up of HTN. The problem is well controlled. Risk factors for coronary artery disease include Age > 27, male, HTN and elevated cholesterol. Current treatments include amlodipine (Norvasc), hydrochlorothiazide (HCTZ) and lisinopril (Prinivil). The patient is compliant all of the time. Lifestyle changes the patient has made include dietary improvement(s) and weight management. Today the patient is complaining of none. Hyperlipidemia  The 10-year ASCVD risk score (Beverly Corbett, et al., 2013) is: 20.6%    Values used to calculate the score:      Age: 67 years      Sex: Male      Is Non- : No      Diabetic: No      Tobacco smoker: No      Systolic Blood Pressure: 850 mmHg      Is BP treated: Yes      HDL Cholesterol: 50 mg/dL      Total Cholesterol: 160 mg/dL    ROS:  Review of Systems   Constitutional: Negative for chills, fatigue and fever. Respiratory: Negative for cough, shortness of breath and wheezing. Cardiovascular: Negative for chest pain and palpitations. Gastrointestinal: Negative for abdominal pain, constipation, diarrhea, nausea and vomiting. Musculoskeletal: Negative for arthralgias and back pain. Skin: Negative for rash. Neurological: Negative for dizziness and headaches. Psychiatric/Behavioral: Negative for dysphoric mood. The patient is not nervous/anxious. All other systems reviewed and are negative.        Current Outpatient Medications on File Prior to Visit   Medication Sig Dispense Refill    omeprazole (PRILOSEC) 40 MG delayed release capsule Take 1 capsule by mouth every morning (before breakfast) 90 capsule 1    lisinopril-hydroCHLOROthiazide (PRINZIDE;ZESTORETIC) 20-12.5 MG per tablet take 1 tablet by mouth every morning 90 tablet 1    Cholecalciferol (VITAMIN D3) 50 MCG (2000 UT) CAPS Take by mouth      Zinc Sulfate (ZINC 15 PO) Take by mouth      lipase-protease-amylase (CREON) 79893-063841 units CPEP delayed release capsule Take 2 capsules by mouth 5 times daily 250 capsule 5    Multiple Vitamins-Minerals (THERAPEUTIC MULTIVITAMIN-MINERALS) tablet Take 1 tablet by mouth daily      aspirin 81 MG EC tablet Take 81 mg by mouth daily       No current facility-administered medications on file prior to visit. Allergies   Allergen Reactions    Covid-19 (Mrna) Vaccine Swelling     Throat swelling    Penicillins        Past Medical History:   Diagnosis Date    Chronic cholecystitis with calculus     Colon polyps     DJD (degenerative joint disease)     Gallstone pancreatitis     GERD (gastroesophageal reflux disease)     peptic reflux disease    Headache     Hyperlipidemia     Hypertension     Necrotizing pancreatitis 09/2021    Osteoarthritis      Past Surgical History:   Procedure Laterality Date    CHOLECYSTECTOMY, LAPAROSCOPIC N/A 9/8/2021    LAP ROBOTIC CHOLECYSTECTOMY WITH INTRAOPERATIVE ULTRASOUND performed [facility] at [facility] COLONOSCOPY      ENDOSCOPY, COLON, DIAGNOSTIC      KNEE CARTILAGE SURGERY      UPPER GASTROINTESTINAL ENDOSCOPY N/A 8/9/2021    EGD ESOPHAGOGASTRODUODENOSCOPY ULTRASOUND performed by Jillian Vilchis MD at [address] [place] [address] 11/29/2021    EGD ESOPHAGOGASTRODUODENOSCOPY ULTRASOUND performed by Jillian Vilchis MD at [facility] ENDOSCOPY     No family history on file.      Social History     Socioeconomic History    Marital status:      Spouse name: Not on file    Number of children: Not on file    Years of education: Not on file    Highest education level: Not on file   Occupational History    Not on file   Tobacco Use    Smoking status: Never Smoker    Smokeless tobacco: Never Used   Vaping Use    Vaping Use: Never used   Substance and Sexual Activity    Alcohol use: Not Currently     Comment:      Drug use: Never    Sexual activity: Not on file   Other Topics Concern    Not on file   Social History Narrative    Not on file     Social Determinants of Health     Financial Resource Strain:     Difficulty of Paying Living Expenses: Not on file   Food Insecurity:     Worried About Running Out of Food in the Last Year: Not on file    Yamil of Food in the Last Year: Not on file   Transportation Needs:     Lack of Transportation (Medical): Not on file    Lack of Transportation (Non-Medical): Not on file   Physical Activity:     Days of Exercise per Week: Not on file    Minutes of Exercise per Session: Not on file   Stress:     Feeling of Stress : Not on file   Social Connections:     Frequency of Communication with Friends and Family: Not on file    Frequency of Social Gatherings with Friends and Family: Not on file    Attends Anglican Services: Not on file    Active Member of [address] [place] [address] or Organizations: Not on file    Attends Club or Organization Meetings: Not on file    Marital Status: Not on file   Intimate Partner Violence:     Fear of Current or Ex-Partner: Not on file    Emotionally Abused: Not on file    Physically Abused: Not on file    Sexually Abused: Not on file   Housing Stability:     Unable to Pay for Housing in the Last Year: Not on file    Number of [place] in the Last Year: Not on file    Unstable Housing in the Last Year: Not on file       Vitals:    06/03/22 1458   BP: 124/70   Pulse: 68   Temp: 98.8 °F (37.1 °C)   SpO2: 98%   Weight: 193 lb (87.5 kg)       Physical Exam:  Physical Exam  Vitals and nursing note reviewed. Constitutional:       General: He is not in acute distress. Appearance: Normal appearance. He is well-developed and normal weight. He is not ill-appearing. HENT:      Head: Normocephalic and atraumatic. Right Ear: Hearing and external ear normal.      Left Ear: Hearing and external ear normal.      Nose:      Comments: Wearing mask  Eyes:      General: Lids are normal. No scleral icterus. Extraocular Movements: Extraocular movements intact. Conjunctiva/sclera: Conjunctivae normal.   Neck:      Thyroid: No thyromegaly. Vascular: No carotid bruit. Cardiovascular:      Rate and Rhythm: Normal rate and regular rhythm. Heart sounds: Normal heart sounds. No murmur heard. Pulmonary:      Effort: Pulmonary effort is normal. No respiratory distress. Breath sounds: Normal breath sounds. No wheezing. Musculoskeletal:         General: No tenderness or deformity. Normal range of motion. Cervical back: Normal range of motion and neck supple. Right lower leg: No edema. Left lower leg: No edema. Lymphadenopathy:      Cervical: No cervical adenopathy. Skin:     General: Skin is warm and dry. Findings: No rash. Neurological:      General: No focal deficit present. Mental Status: He is alert and oriented to person, place, and time. Gait: Gait normal.   Psychiatric:         Mood and Affect: Mood and affect normal.         Speech: Speech normal.         Behavior: Behavior normal.         Thought Content:  Thought content normal.         Labs:  CBC with Differential:    Lab Results   Component Value Date    WBC 5.3 11/23/2021    RBC 4.38 11/23/2021    HGB 13.9 11/23/2021    HCT 42.1 11/23/2021     11/23/2021    MCV 96.1 11/23/2021    MCH 31.7 11/23/2021    MCHC 33.0 11/23/2021    RDW 14.0 11/23/2021    LYMPHOPCT 15.2 11/23/2021    MONOPCT 8.0 11/23/2021    BASOPCT 0.6 11/23/2021    MONOSABS 0.42 11/23/2021    LYMPHSABS 0.80 11/23/2021    EOSABS 0.06 11/23/2021    BASOSABS 0.03 11/23/2021     CMP:    Lab Results Component Value Date     11/23/2021    K 4.0 11/23/2021     11/23/2021    CO2 22 11/23/2021    BUN 19 11/23/2021    CREATININE 0.9 11/23/2021    CREATININE 1.1 04/12/2019    GFRAA >60 11/23/2021    LABGLOM >60 11/23/2021    GLUCOSE 116 11/23/2021    PROT 6.9 11/23/2021    LABALBU 4.2 11/23/2021    CALCIUM 9.5 11/23/2021    BILITOT 0.3 11/23/2021    ALKPHOS 109 11/23/2021    AST 32 11/23/2021    ALT 37 11/23/2021     HgBA1c:    Lab Results   Component Value Date    LABA1C 5.5 11/23/2021     FLP:    Lab Results   Component Value Date    TRIG 90 11/23/2021    HDL 50 11/23/2021    LDLCALC 92 11/23/2021    LABVLDL 18 11/23/2021     TSH:    Lab Results   Component Value Date    TSH 1.010 11/23/2021     PSA:   Lab Results   Component Value Date    PSA 0.85 11/23/2021          Assessment and Plan:  Melia Whelan was seen today for 6 month follow-up, hypertension and hyperlipidemia. Diagnoses and all orders for this visit:    Benign essential hypertension  -     amLODIPine (NORVASC) 5 MG tablet; Take 1 tablet by mouth nightly  -     CBC with Auto Differential; Future  -     Comprehensive Metabolic Panel; Future  -     Lipid Panel; Future  -     TSH; Future  -     Urinalysis; Future  Well controlled. Due for fasting labs at this time. Mixed hyperlipidemia  Stable. Due for recheck    GERD without esophagitis  Patient has been doing well with GERD since having GB removed. Will do trial off medication to see if he no longer needs. Impaired fasting glucose  -     Hemoglobin A1C; Future    Vitamin D insufficiency  -     Vitamin D 25 Hydroxy; Future          Return in about 6 months (around 12/3/2022), or if symptoms worsen or fail to improve, for AWV.       Seen By:  Sonya Boyle,

## 2022-06-03 NOTE — PROGRESS NOTES
6/3/22  Cy Roller : 1950 Sex: male  Age: 67 y.o. Chief Complaint   Patient presents with    6 Month Follow-Up     HPI:  67 y.o. male presents today for {NUMBER:85744} {TIME FRAME:} follow up of ***. Patient's chart, medical, surgical and medication history all reviewed. ROS:  Review of Systems   Current Outpatient Medications on File Prior to Visit   Medication Sig Dispense Refill    omeprazole (PRILOSEC) 40 MG delayed release capsule Take 1 capsule by mouth every morning (before breakfast) 90 capsule 1    lisinopril-hydroCHLOROthiazide (PRINZIDE;ZESTORETIC) 20-12.5 MG per tablet take 1 tablet by mouth every morning 90 tablet 1    amLODIPine (NORVASC) 5 MG tablet Take 1 tablet by mouth nightly 90 tablet 1    Cholecalciferol (VITAMIN D3) 50 MCG ( UT) CAPS Take by mouth      Zinc Sulfate (ZINC 15 PO) Take by mouth      lipase-protease-amylase (CREON) 55908-915818 units CPEP delayed release capsule Take 2 capsules by mouth 5 times daily 250 capsule 5    Multiple Vitamins-Minerals (THERAPEUTIC MULTIVITAMIN-MINERALS) tablet Take 1 tablet by mouth daily      aspirin 81 MG EC tablet Take 81 mg by mouth daily       No current facility-administered medications on file prior to visit.        Allergies   Allergen Reactions    Covid-19 (Mrna) Vaccine Swelling     Throat swelling    Penicillins        Past Medical History:   Diagnosis Date    Colon polyps     DJD (degenerative joint disease)     GERD (gastroesophageal reflux disease)     peptic reflux disease    Headache     Hyperlipidemia     Hypertension     Necrotizing pancreatitis 2021    Osteoarthritis      Past Surgical History:   Procedure Laterality Date    CHOLECYSTECTOMY, LAPAROSCOPIC N/A 2021    LAP ROBOTIC CHOLECYSTECTOMY WITH INTRAOPERATIVE ULTRASOUND performed by Reyna Menendez MD at [place] [address] COLONOSCOPY      ENDOSCOPY, COLON, DIAGNOSTIC      KNEE CARTILAGE SURGERY      UPPER GASTROINTESTINAL ENDOSCOPY N/A 8/9/2021    EGD ESOPHAGOGASTRODUODENOSCOPY ULTRASOUND performed by Raymon Araujo MD at [address] N/A 11/29/2021    EGD ESOPHAGOGASTRODUODENOSCOPY ULTRASOUND performed by Raymon Araujo MD at [facility] ENDOSCOPY     No family history on file. Social History     Socioeconomic History    Marital status:      Spouse name: Not on file    Number of children: Not on file    Years of education: Not on file    Highest education level: Not on file   Occupational History    Not on file   Tobacco Use    Smoking status: Never Smoker    Smokeless tobacco: Never Used   Vaping Use    Vaping Use: Never used   Substance and Sexual Activity    Alcohol use: Not Currently     Comment:      Drug use: Never    Sexual activity: Not on file   Other Topics Concern    Not on file   Social History Narrative    Not on file     Social Determinants of Health     Financial Resource Strain:     Difficulty of Paying Living Expenses: Not on file   Food Insecurity:     Worried About Running Out of Food in the Last Year: Not on file    Yamil of Food in the Last Year: Not on file   Transportation Needs:     Lack of Transportation (Medical): Not on file    Lack of Transportation (Non-Medical):  Not on file   Physical Activity:     Days of Exercise per Week: Not on file    Minutes of Exercise per Session: Not on file   Stress:     Feeling of Stress : Not on file   Social Connections:     Frequency of Communication with Friends and Family: Not on file    Frequency of Social Gatherings with Friends and Family: Not on file    Attends [employer] Services: Not on file    Active Member of Clubs or Organizations: Not on file    Attends Club or Organization Meetings: Not on file    Marital Status: Not on file   Intimate Partner Violence:     Fear of Current or Ex-Partner: Not on file    Emotionally Abused: Not on file    Physically Abused: Not on file  Sexually Abused: Not on file   Housing Stability:     Unable to Pay for Housing in the Last Year: Not on file    Number of Places Lived in the Last Year: Not on file    Unstable Housing in the Last Year: Not on file       Vitals:    06/03/22 1458   BP: 124/70   Pulse: 68   Temp: 98.8 °F (37.1 °C)   SpO2: 98%   Weight: 193 lb (87.5 kg)       Physical Exam:  Physical Exam    Labs:  {APFN:[id number]}     Assessment and Plan:  Lida Paz was seen today for 6 month follow-up. Diagnoses and all orders for this visit:    Postherpetic neuralgia    Benign essential hypertension        No follow-ups on file.       Seen By:  Leta Baeza,

## 2022-06-17 DIAGNOSIS — R73.01 IMPAIRED FASTING GLUCOSE: ICD-10-CM

## 2022-06-17 DIAGNOSIS — E55.9 VITAMIN D INSUFFICIENCY: ICD-10-CM

## 2022-06-17 DIAGNOSIS — I10 BENIGN ESSENTIAL HYPERTENSION: ICD-10-CM

## 2022-06-17 LAB
ALBUMIN SERPL-MCNC: 4.6 G/DL (ref 3.5–5.2)
ALP BLD-CCNC: 75 U/L (ref 40–129)
ALT SERPL-CCNC: 22 U/L (ref 0–40)
ANION GAP SERPL CALCULATED.3IONS-SCNC: 20 MMOL/L (ref 7–16)
AST SERPL-CCNC: 23 U/L (ref 0–39)
BACTERIA: NORMAL /HPF
BASOPHILS ABSOLUTE: 0.03 E9/L (ref 0–0.2)
BASOPHILS RELATIVE PERCENT: 0.8 % (ref 0–2)
BILIRUB SERPL-MCNC: 0.6 MG/DL (ref 0–1.2)
BILIRUBIN URINE: NEGATIVE
BLOOD, URINE: NORMAL
BUN BLDV-MCNC: 16 MG/DL (ref 6–23)
CALCIUM SERPL-MCNC: 9.9 MG/DL (ref 8.6–10.2)
CHLORIDE BLD-SCNC: 103 MMOL/L (ref 98–107)
CHOLESTEROL, TOTAL: 153 MG/DL (ref 0–199)
CLARITY: CLEAR
CO2: 19 MMOL/L (ref 22–29)
COLOR: YELLOW
CREAT SERPL-MCNC: 1.2 MG/DL (ref 0.7–1.2)
EOSINOPHILS ABSOLUTE: 0.1 E9/L (ref 0.05–0.5)
EOSINOPHILS RELATIVE PERCENT: 2.5 % (ref 0–6)
GFR AFRICAN AMERICAN: >60
GFR NON-AFRICAN AMERICAN: 59 ML/MIN/1.73
GLUCOSE BLD-MCNC: 130 MG/DL (ref 74–99)
GLUCOSE URINE: NEGATIVE MG/DL
HBA1C MFR BLD: 5.8 % (ref 4–5.6)
HCT VFR BLD CALC: 42 % (ref 37–54)
HDLC SERPL-MCNC: 42 MG/DL
HEMOGLOBIN: 14.6 G/DL (ref 12.5–16.5)
IMMATURE GRANULOCYTES #: 0.01 E9/L
IMMATURE GRANULOCYTES %: 0.3 % (ref 0–5)
KETONES, URINE: NEGATIVE MG/DL
LDL CHOLESTEROL CALCULATED: 95 MG/DL (ref 0–99)
LEUKOCYTE ESTERASE, URINE: NEGATIVE
LYMPHOCYTES ABSOLUTE: 0.67 E9/L (ref 1.5–4)
LYMPHOCYTES RELATIVE PERCENT: 16.8 % (ref 20–42)
MCH RBC QN AUTO: 33.2 PG (ref 26–35)
MCHC RBC AUTO-ENTMCNC: 34.8 % (ref 32–34.5)
MCV RBC AUTO: 95.5 FL (ref 80–99.9)
MONOCYTES ABSOLUTE: 0.31 E9/L (ref 0.1–0.95)
MONOCYTES RELATIVE PERCENT: 7.8 % (ref 2–12)
NEUTROPHILS ABSOLUTE: 2.86 E9/L (ref 1.8–7.3)
NEUTROPHILS RELATIVE PERCENT: 71.8 % (ref 43–80)
NITRITE, URINE: NEGATIVE
PDW BLD-RTO: 13.1 FL (ref 11.5–15)
PH UA: 6 (ref 5–9)
PLATELET # BLD: 101 E9/L (ref 130–450)
PMV BLD AUTO: 11 FL (ref 7–12)
POTASSIUM SERPL-SCNC: 4.2 MMOL/L (ref 3.5–5)
PROTEIN UA: NEGATIVE MG/DL
RBC # BLD: 4.4 E12/L (ref 3.8–5.8)
RBC UA: NORMAL /HPF (ref 0–2)
SODIUM BLD-SCNC: 142 MMOL/L (ref 132–146)
SPECIFIC GRAVITY UA: 1.02 (ref 1–1.03)
TOTAL PROTEIN: 7.4 G/DL (ref 6.4–8.3)
TRIGL SERPL-MCNC: 82 MG/DL (ref 0–149)
TSH SERPL DL<=0.05 MIU/L-ACNC: 0.53 UIU/ML (ref 0.27–4.2)
UROBILINOGEN, URINE: 0.2 E.U./DL
VITAMIN D 25-HYDROXY: 49 NG/ML (ref 30–100)
VLDLC SERPL CALC-MCNC: 16 MG/DL
WBC # BLD: 4 E9/L (ref 4.5–11.5)
WBC UA: NORMAL /HPF (ref 0–5)

## 2022-09-29 ENCOUNTER — TELEPHONE (OUTPATIENT)
Dept: PRIMARY CARE CLINIC | Age: 72
End: 2022-09-29

## 2022-09-29 DIAGNOSIS — N32.81 OAB (OVERACTIVE BLADDER): Primary | ICD-10-CM

## 2022-09-29 NOTE — TELEPHONE ENCOUNTER
Patient calling states you had talked to him about a medication for frequent urination. He would like to go ahead and try medication.

## 2022-09-30 ENCOUNTER — TELEPHONE (OUTPATIENT)
Dept: PRIMARY CARE CLINIC | Age: 72
End: 2022-09-30

## 2022-09-30 RX ORDER — OXYBUTYNIN CHLORIDE 10 MG/1
10 TABLET, EXTENDED RELEASE ORAL DAILY
Qty: 30 TABLET | Refills: 3 | Status: SHIPPED | OUTPATIENT
Start: 2022-09-30

## 2022-09-30 NOTE — TELEPHONE ENCOUNTER
The pt is calling because he went to  the Myrbetriq from the pharmacy and it is going to cost him $500, he is asking if there is something else you would be able to prescribe

## 2022-11-11 DIAGNOSIS — I10 BENIGN ESSENTIAL HYPERTENSION: ICD-10-CM

## 2022-11-11 RX ORDER — AMLODIPINE BESYLATE 5 MG/1
5 TABLET ORAL NIGHTLY
Qty: 90 TABLET | Refills: 1 | Status: SHIPPED | OUTPATIENT
Start: 2022-11-11 | End: 2023-02-09

## 2022-11-11 RX ORDER — LISINOPRIL AND HYDROCHLOROTHIAZIDE 20; 12.5 MG/1; MG/1
TABLET ORAL
Qty: 90 TABLET | Refills: 1 | Status: SHIPPED | OUTPATIENT
Start: 2022-11-11

## 2022-11-11 NOTE — TELEPHONE ENCOUNTER
----- Message from Mateo Gutierrez sent at 11/11/2022 11:53 AM EST -----  Subject: Refill Request    QUESTIONS  Name of Medication? lisinopril-hydroCHLOROthiazide (PRINZIDE;ZESTORETIC)   20-12.5 MG per tablet  Patient-reported dosage and instructions? once daily  How many days do you have left? 7  Preferred Pharmacy? [address] phone number (if available)? [phone]  ---------------------------------------------------------------------------  --------------,  Name of Medication? amLODIPine (NORVASC) 5 MG tablet  Patient-reported dosage and instructions? once daily  How many days do you have left? 7  Preferred Pharmacy? [address] phone number (if available)? [phone]  Additional Information for Provider? pt was going to try to schedule 6 mo   f/u but nothing available til january. he wasn't sure if he needed to come   in or if you could just refill the meds. please call to discuss. ---------------------------------------------------------------------------  --------------  Alie Hopes INFO  What is the best way for the office to contact you? OK to leave message on   voicemail  Preferred Call Back Phone Number? [phone]  ---------------------------------------------------------------------------  --------------  SCRIPT ANSWERS  Relationship to Patient?  Self

## 2022-11-22 DIAGNOSIS — K21.9 GERD WITHOUT ESOPHAGITIS: ICD-10-CM

## 2022-11-22 RX ORDER — OMEPRAZOLE 40 MG/1
40 CAPSULE, DELAYED RELEASE ORAL
Qty: 90 CAPSULE | Refills: 1 | Status: SHIPPED | OUTPATIENT
Start: 2022-11-22

## 2022-12-13 ENCOUNTER — TELEPHONE (OUTPATIENT)
Dept: HEMATOLOGY | Age: 72
End: 2022-12-13

## 2022-12-13 NOTE — TELEPHONE ENCOUNTER
The patient called and stated that he had some issue with his creon delivery from [facility]. He wanted to know if we could give him some samples.  The following samples were given to the patient    Creon 36,000 unit capsules    LOT [id number] X 1 bottle  EXP 09/11/2023    LOT [id number] X3 bottles  EXP 10/12/2023  Electronically signed by Chetan Tapia MA on 12/13/2022 at 11:02 AM

## 2023-01-17 ENCOUNTER — OFFICE VISIT (OUTPATIENT)
Dept: PRIMARY CARE CLINIC | Age: 73
End: 2023-01-17
Payer: MEDICARE

## 2023-01-17 VITALS
SYSTOLIC BLOOD PRESSURE: 118 MMHG | HEART RATE: 61 BPM | WEIGHT: 196.25 LBS | HEIGHT: 71 IN | DIASTOLIC BLOOD PRESSURE: 78 MMHG | OXYGEN SATURATION: 96 % | BODY MASS INDEX: 27.48 KG/M2 | TEMPERATURE: 98.1 F

## 2023-01-17 DIAGNOSIS — R73.01 IMPAIRED FASTING GLUCOSE: ICD-10-CM

## 2023-01-17 DIAGNOSIS — Z12.5 SCREENING FOR PROSTATE CANCER: ICD-10-CM

## 2023-01-17 DIAGNOSIS — E78.2 MIXED HYPERLIPIDEMIA: ICD-10-CM

## 2023-01-17 DIAGNOSIS — E55.9 VITAMIN D INSUFFICIENCY: ICD-10-CM

## 2023-01-17 DIAGNOSIS — I10 BENIGN ESSENTIAL HYPERTENSION: Primary | ICD-10-CM

## 2023-01-17 DIAGNOSIS — Z12.11 SCREENING FOR COLON CANCER: ICD-10-CM

## 2023-01-17 DIAGNOSIS — K86.3 PANCREATIC PSEUDOCYST: ICD-10-CM

## 2023-01-17 PROCEDURE — 3078F DIAST BP <80 MM HG: CPT | Performed by: FAMILY MEDICINE

## 2023-01-17 PROCEDURE — 1123F ACP DISCUSS/DSCN MKR DOCD: CPT | Performed by: FAMILY MEDICINE

## 2023-01-17 PROCEDURE — 99214 OFFICE O/P EST MOD 30 MIN: CPT | Performed by: FAMILY MEDICINE

## 2023-01-17 PROCEDURE — 3074F SYST BP LT 130 MM HG: CPT | Performed by: FAMILY MEDICINE

## 2023-01-17 SDOH — ECONOMIC STABILITY: FOOD INSECURITY: WITHIN THE PAST 12 MONTHS, YOU WORRIED THAT YOUR FOOD WOULD RUN OUT BEFORE YOU GOT MONEY TO BUY MORE.: NEVER TRUE

## 2023-01-17 SDOH — ECONOMIC STABILITY: FOOD INSECURITY: WITHIN THE PAST 12 MONTHS, THE FOOD YOU BOUGHT JUST DIDN'T LAST AND YOU DIDN'T HAVE MONEY TO GET MORE.: NEVER TRUE

## 2023-01-17 ASSESSMENT — SOCIAL DETERMINANTS OF HEALTH (SDOH): HOW HARD IS IT FOR YOU TO PAY FOR THE VERY BASICS LIKE FOOD, HOUSING, MEDICAL CARE, AND HEATING?: NOT HARD AT ALL

## 2023-01-17 ASSESSMENT — ENCOUNTER SYMPTOMS
WHEEZING: 0
COUGH: 0
DIARRHEA: 0
SHORTNESS OF BREATH: 0
VOMITING: 0
BACK PAIN: 0
ABDOMINAL PAIN: 0
CONSTIPATION: 0
NAUSEA: 0

## 2023-01-17 NOTE — PROGRESS NOTES
23  Hiren Ramos : 1950 Sex: male  Age: 67 y.o. Chief Complaint   Patient presents with    Hypertension    Discuss Medications     HPI:  67 y.o. male patient presents today for 6 month(s) follow up of chronic medical conditions, medication refills and FBW. Patient's chart, medical, surgical and medication history all reviewed. Hypertension   The patient presents today for follow up of HTN. The problem is well controlled. Risk factors for coronary artery disease include Age > 27, male, HTN and elevated cholesterol. Current treatments include amlodipine (Norvasc), hydrochlorothiazide (HCTZ) and lisinopril (Prinivil). The patient is compliant all of the time. Lifestyle changes the patient has made include dietary improvement(s) and weight management. Today the patient is complaining of none. Hyperlipidemia  The 10-year ASCVD risk score (Jose PANDA, et al., 2019) is: 20%    Values used to calculate the score:      Age: 67 years      Sex: Male      Is Non- : No      Diabetic: No      Tobacco smoker: No      Systolic Blood Pressure: 998 mmHg      Is BP treated: Yes      HDL Cholesterol: 42 mg/dL      Total Cholesterol: 153 mg/dL    ROS:  Review of Systems   Constitutional:  Negative for chills, fatigue and fever. Respiratory:  Negative for cough, shortness of breath and wheezing. Cardiovascular:  Negative for chest pain and palpitations. Gastrointestinal:  Negative for abdominal pain, constipation, diarrhea, nausea and vomiting. Musculoskeletal:  Negative for arthralgias and back pain. Skin:  Negative for rash. Neurological:  Negative for dizziness and headaches. Psychiatric/Behavioral:  Negative for dysphoric mood. The patient is not nervous/anxious. All other systems reviewed and are negative.      Current Outpatient Medications on File Prior to Visit   Medication Sig Dispense Refill    omeprazole (PRILOSEC) 40 MG delayed release capsule Take 1 capsule by mouth every morning (before breakfast) 90 capsule 1    amLODIPine (NORVASC) 5 MG tablet Take 1 tablet by mouth nightly 90 tablet 1    lisinopril-hydroCHLOROthiazide (PRINZIDE;ZESTORETIC) 20-12.5 MG per tablet take 1 tablet by mouth every morning 90 tablet 1    lipase-protease-amylase (CREON) 40864-011840 units CPEP delayed release capsule Take 2 capsules by mouth 5 times daily 250 capsule 5    Cholecalciferol (VITAMIN D3) 50 MCG (2000 UT) CAPS Take by mouth      Zinc Sulfate (ZINC 15 PO) Take by mouth      Multiple Vitamins-Minerals (THERAPEUTIC MULTIVITAMIN-MINERALS) tablet Take 1 tablet by mouth daily      aspirin 81 MG EC tablet Take 81 mg by mouth daily       No current facility-administered medications on file prior to visit. Allergies   Allergen Reactions    Covid-19 (Mrna) Vaccine Swelling     Throat swelling    Penicillins        Past Medical History:   Diagnosis Date    Chronic cholecystitis with calculus     Colon polyps     DJD (degenerative joint disease)     Gallstone pancreatitis     GERD (gastroesophageal reflux disease)     peptic reflux disease    Headache     Hyperlipidemia     Hypertension     Necrotizing pancreatitis 09/2021    Osteoarthritis      Past Surgical History:   Procedure Laterality Date    CHOLECYSTECTOMY, LAPAROSCOPIC N/A 9/8/2021    LAP ROBOTIC CHOLECYSTECTOMY WITH INTRAOPERATIVE ULTRASOUND performed by Natalie Healy MD at [address] [place] [address], COLON, DIAGNOSTIC      KNEE CARTILAGE SURGERY      UPPER GASTROINTESTINAL ENDOSCOPY N/A 8/9/2021    EGD ESOPHAGOGASTRODUODENOSCOPY ULTRASOUND performed by Catalina Laws MD at [address] [place] [address] 11/29/2021    EGD ESOPHAGOGASTRODUODENOSCOPY ULTRASOUND performed by Catalina Laws MD at [address]. No pertinent family history.      Social History     Socioeconomic History    Marital status:      Spouse name: Not on file Number of children: Not on file    Years of education: Not on file    Highest education level: Not on file   Occupational History    Not on file   Tobacco Use    Smoking status: Never    Smokeless tobacco: Never   Vaping Use    Vaping Use: Never used   Substance and Sexual Activity    Alcohol use: Not Currently     Comment:      Drug use: Never    Sexual activity: Not on file   Other Topics Concern    Not on file   Social History Narrative    Not on file     Social Determinants of Health     Financial Resource Strain: Low Risk     Difficulty of Paying Living Expenses: Not hard at all   Food Insecurity: No Food Insecurity    Worried About Running Out of Food in the Last Year: Never true    Ran Out of Food in the Last Year: Never true   Transportation Needs: Not on file   Physical Activity: Not on file   Stress: Not on file   Social Connections: Not on file   Intimate Partner Violence: Not on file   Housing Stability: Not on file       Vitals:    01/17/23 1120   BP: 118/78   Pulse: 61   Temp: 98.1 °F (36.7 °C)   SpO2: 96%   Weight: 196 lb 4 oz (89 kg)   Height: 5' 11\" (1.803 m)       Physical Exam:  Physical Exam  Vitals and nursing note reviewed. Constitutional:       General: He is not in acute distress. Appearance: Normal appearance. He is well-developed and normal weight. He is not ill-appearing. HENT:      Head: Normocephalic and atraumatic. Right Ear: Hearing and external ear normal.      Left Ear: Hearing and external ear normal.      Nose: Nose normal.   Eyes:      General: Lids are normal. No scleral icterus. Extraocular Movements: Extraocular movements intact. Conjunctiva/sclera: Conjunctivae normal.   Neck:      Thyroid: No thyromegaly. Vascular: No carotid bruit. Cardiovascular:      Rate and Rhythm: Normal rate and regular rhythm. Heart sounds: Normal heart sounds. No murmur heard. Pulmonary:      Effort: Pulmonary effort is normal. No respiratory distress.       Breath sounds: Normal breath sounds. No wheezing. Musculoskeletal:         General: No tenderness or deformity. Normal range of motion. Cervical back: Normal range of motion and neck supple. Right lower leg: No edema. Left lower leg: No edema. Lymphadenopathy:      Cervical: No cervical adenopathy. Skin:     General: Skin is warm and dry. Findings: No rash. Neurological:      General: No focal deficit present. Mental Status: He is alert and oriented to person, place, and time. Gait: Gait normal.   Psychiatric:         Mood and Affect: Mood and affect normal.         Speech: Speech normal.         Behavior: Behavior normal.         Thought Content:  Thought content normal.       Labs:  CBC with Differential:    Lab Results   Component Value Date/Time    WBC 4.0 06/17/2022 08:03 AM    RBC 4.40 06/17/2022 08:03 AM    HGB 14.6 06/17/2022 08:03 AM    HCT 42.0 06/17/2022 08:03 AM     06/17/2022 08:03 AM    MCV 95.5 06/17/2022 08:03 AM    MCH 33.2 06/17/2022 08:03 AM    MCHC 34.8 06/17/2022 08:03 AM    RDW 13.1 06/17/2022 08:03 AM    LYMPHOPCT 16.8 06/17/2022 08:03 AM    MONOPCT 7.8 06/17/2022 08:03 AM    BASOPCT 0.8 06/17/2022 08:03 AM    MONOSABS 0.31 06/17/2022 08:03 AM    LYMPHSABS 0.67 06/17/2022 08:03 AM    EOSABS 0.10 06/17/2022 08:03 AM    BASOSABS 0.03 06/17/2022 08:03 AM     CMP:    Lab Results   Component Value Date/Time     06/17/2022 08:03 AM    K 4.2 06/17/2022 08:03 AM     06/17/2022 08:03 AM    CO2 19 06/17/2022 08:03 AM    BUN 16 06/17/2022 08:03 AM    CREATININE 1.2 06/17/2022 08:03 AM    CREATININE 1.1 04/12/2019 12:00 AM    GFRAA >60 06/17/2022 08:03 AM    LABGLOM 59 06/17/2022 08:03 AM    GLUCOSE 130 06/17/2022 08:03 AM    PROT 7.4 06/17/2022 08:03 AM    LABALBU 4.6 06/17/2022 08:03 AM    CALCIUM 9.9 06/17/2022 08:03 AM    BILITOT 0.6 06/17/2022 08:03 AM    ALKPHOS 75 06/17/2022 08:03 AM    AST 23 06/17/2022 08:03 AM    ALT 22 06/17/2022 08:03 AM HgBA1c:    Lab Results   Component Value Date/Time    LABA1C 5.8 06/17/2022 08:03 AM     FLP:    Lab Results   Component Value Date/Time    TRIG 82 06/17/2022 08:03 AM    HDL 42 06/17/2022 08:03 AM    LDLCALC 95 06/17/2022 08:03 AM    LABVLDL 16 06/17/2022 08:03 AM     TSH:    Lab Results   Component Value Date/Time    TSH 0.528 06/17/2022 08:03 AM     PSA:   Lab Results   Component Value Date/Time    PSA 0.85 11/23/2021 08:04 AM          Assessment and Plan:  Kim Titus was seen today for hypertension and discuss medications. Diagnoses and all orders for this visit:    Benign essential hypertension  -     CBC with Auto Differential; Future  -     Comprehensive Metabolic Panel; Future  -     Lipid Panel; Future  -     TSH; Future  -     Urinalysis; Future  -     Uric Acid; Future  Well controlled. Continue current regimen. Due for fasting labs at this time. Mixed hyperlipidemia  -     Lipid Panel; Future    Pancreatic pseudocyst  Encouraged patient to make follow up appt with Dr. Che Lugo as it was recommended to repeat MRI in 1 year. Impaired fasting glucose  -     Hemoglobin A1C; Future    Vitamin D insufficiency  -     Vitamin D 25 Hydroxy; Future    Screening for prostate cancer  -     PSA Screening; Future    Screening for colon cancer  -     Dulce Maria Cota MD, General Surgery, [facility]        Return in about 6 months (around 7/17/2023), or if symptoms worsen or fail to improve, for AWV.       Seen By:  Luke Matthews DO

## 2023-01-19 DIAGNOSIS — R73.01 IMPAIRED FASTING GLUCOSE: ICD-10-CM

## 2023-01-19 DIAGNOSIS — I10 BENIGN ESSENTIAL HYPERTENSION: ICD-10-CM

## 2023-01-19 DIAGNOSIS — E55.9 VITAMIN D INSUFFICIENCY: ICD-10-CM

## 2023-01-19 DIAGNOSIS — E78.2 MIXED HYPERLIPIDEMIA: ICD-10-CM

## 2023-01-19 DIAGNOSIS — Z12.5 SCREENING FOR PROSTATE CANCER: ICD-10-CM

## 2023-01-19 LAB
ALBUMIN SERPL-MCNC: 4.4 G/DL (ref 3.5–5.2)
ALP BLD-CCNC: 80 U/L (ref 40–129)
ALT SERPL-CCNC: 34 U/L (ref 0–40)
ANION GAP SERPL CALCULATED.3IONS-SCNC: 13 MMOL/L (ref 7–16)
AST SERPL-CCNC: 28 U/L (ref 0–39)
BASOPHILS ABSOLUTE: 0.05 E9/L (ref 0–0.2)
BASOPHILS RELATIVE PERCENT: 0.8 % (ref 0–2)
BILIRUB SERPL-MCNC: 0.6 MG/DL (ref 0–1.2)
BILIRUBIN URINE: NEGATIVE
BLOOD, URINE: NEGATIVE
BUN BLDV-MCNC: 15 MG/DL (ref 6–23)
CALCIUM SERPL-MCNC: 10.2 MG/DL (ref 8.6–10.2)
CHLORIDE BLD-SCNC: 96 MMOL/L (ref 98–107)
CHOLESTEROL, TOTAL: 171 MG/DL (ref 0–199)
CLARITY: CLEAR
CO2: 28 MMOL/L (ref 22–29)
COLOR: YELLOW
CREAT SERPL-MCNC: 1.1 MG/DL (ref 0.7–1.2)
EOSINOPHILS ABSOLUTE: 0.17 E9/L (ref 0.05–0.5)
EOSINOPHILS RELATIVE PERCENT: 2.9 % (ref 0–6)
GFR SERPL CREATININE-BSD FRML MDRD: >60 ML/MIN/1.73
GLUCOSE BLD-MCNC: 134 MG/DL (ref 74–99)
GLUCOSE URINE: NEGATIVE MG/DL
HBA1C MFR BLD: 5.9 % (ref 4–5.6)
HCT VFR BLD CALC: 45.5 % (ref 37–54)
HDLC SERPL-MCNC: 37 MG/DL
HEMOGLOBIN: 15.7 G/DL (ref 12.5–16.5)
IMMATURE GRANULOCYTES #: 0.01 E9/L
IMMATURE GRANULOCYTES %: 0.2 % (ref 0–5)
KETONES, URINE: NEGATIVE MG/DL
LDL CHOLESTEROL CALCULATED: 107 MG/DL (ref 0–99)
LEUKOCYTE ESTERASE, URINE: NEGATIVE
LYMPHOCYTES ABSOLUTE: 0.81 E9/L (ref 1.5–4)
LYMPHOCYTES RELATIVE PERCENT: 13.7 % (ref 20–42)
MCH RBC QN AUTO: 32.6 PG (ref 26–35)
MCHC RBC AUTO-ENTMCNC: 34.5 % (ref 32–34.5)
MCV RBC AUTO: 94.6 FL (ref 80–99.9)
MONOCYTES ABSOLUTE: 0.43 E9/L (ref 0.1–0.95)
MONOCYTES RELATIVE PERCENT: 7.3 % (ref 2–12)
NEUTROPHILS ABSOLUTE: 4.45 E9/L (ref 1.8–7.3)
NEUTROPHILS RELATIVE PERCENT: 75.1 % (ref 43–80)
NITRITE, URINE: NEGATIVE
PDW BLD-RTO: 12.8 FL (ref 11.5–15)
PH UA: 6.5 (ref 5–9)
PLATELET # BLD: 118 E9/L (ref 130–450)
PMV BLD AUTO: 10.6 FL (ref 7–12)
POTASSIUM SERPL-SCNC: 4.5 MMOL/L (ref 3.5–5)
PROSTATE SPECIFIC ANTIGEN: 0.95 NG/ML (ref 0–4)
PROTEIN UA: NEGATIVE MG/DL
RBC # BLD: 4.81 E12/L (ref 3.8–5.8)
SODIUM BLD-SCNC: 137 MMOL/L (ref 132–146)
SPECIFIC GRAVITY UA: 1.01 (ref 1–1.03)
TOTAL PROTEIN: 7.4 G/DL (ref 6.4–8.3)
TRIGL SERPL-MCNC: 135 MG/DL (ref 0–149)
TSH SERPL DL<=0.05 MIU/L-ACNC: 0.93 UIU/ML (ref 0.27–4.2)
URIC ACID, SERUM: 8.7 MG/DL (ref 3.4–7)
UROBILINOGEN, URINE: 0.2 E.U./DL
VITAMIN D 25-HYDROXY: 40 NG/ML (ref 30–100)
VLDLC SERPL CALC-MCNC: 27 MG/DL
WBC # BLD: 5.9 E9/L (ref 4.5–11.5)

## 2023-01-27 ENCOUNTER — TELEPHONE (OUTPATIENT)
Dept: HEMATOLOGY | Age: 73
End: 2023-01-27

## 2023-01-27 NOTE — TELEPHONE ENCOUNTER
The patient called regarding his scan he was due for in December of 2022. The patient is undecided on whether he wants to go to [facility] or use our mobile mri unit here.  The patient is going to take the weekend to some research and then call us back with his decision  Electronically signed by Charissa Gomez MA on 1/27/2023 at 11:37 AM

## 2023-01-29 DIAGNOSIS — E55.9 VITAMIN D INSUFFICIENCY: ICD-10-CM

## 2023-01-29 DIAGNOSIS — R73.01 IMPAIRED FASTING GLUCOSE: ICD-10-CM

## 2023-01-29 DIAGNOSIS — I10 BENIGN ESSENTIAL HYPERTENSION: Primary | ICD-10-CM

## 2023-01-29 PROBLEM — K85.91 NECROTIZING PANCREATITIS: Status: RESOLVED | Noted: 2021-09-08 | Resolved: 2023-01-29

## 2023-01-31 ENCOUNTER — TELEPHONE (OUTPATIENT)
Dept: HEMATOLOGY | Age: 73
End: 2023-01-31

## 2023-01-31 NOTE — TELEPHONE ENCOUNTER
The patient called our office today and asked that his script for his MRI be sent over to [facility]. I faxed all info including script and labwork to them this morning. The patient is aware that he has to call there first and schedule and then call our office to schedule a follow up with dr Alana Ryan.   Electronically signed by Lindy Felder MA on 1/31/2023 at 10:54 AM

## 2023-02-02 ENCOUNTER — TELEPHONE (OUTPATIENT)
Dept: HEMATOLOGY | Age: 73
End: 2023-02-02

## 2023-02-02 NOTE — TELEPHONE ENCOUNTER
Falguni from [facility] called and stated for them to do the MRI that they would need a prior auth. The prior auth was done online through [employer].      Order ID [id number]  Valid 02/02/2023 through 05/02/2023    The auth was faxed to ally Montes De Oca at [facility] and also scanned into the patients chart under the media tab  Electronically signed by Stacy Corey MA on 2/2/2023 at 12:15 PM

## 2023-02-07 ENCOUNTER — TELEPHONE (OUTPATIENT)
Dept: HEMATOLOGY | Age: 73
End: 2023-02-07

## 2023-02-07 NOTE — TELEPHONE ENCOUNTER
[facility]    Diazepam 5mg tablet  ~take as a single dose 1 hour prior to mri  #1  No refills  F40.240  Pt notified  Electronically signed by Jim Wilson MA on 2/7/2023 at 9:16 AM

## 2023-02-17 ENCOUNTER — OFFICE VISIT (OUTPATIENT)
Dept: SURGERY | Age: 73
End: 2023-02-17

## 2023-02-17 VITALS
HEART RATE: 98 BPM | WEIGHT: 198 LBS | SYSTOLIC BLOOD PRESSURE: 135 MMHG | BODY MASS INDEX: 27.72 KG/M2 | HEIGHT: 71 IN | OXYGEN SATURATION: 100 % | DIASTOLIC BLOOD PRESSURE: 83 MMHG

## 2023-02-17 DIAGNOSIS — Z86.010 HISTORY OF COLON POLYPS: Primary | ICD-10-CM

## 2023-02-17 NOTE — PROGRESS NOTES
[facility] Note    Assessment/Plan:      Diagnosis Orders   1. History of colon polyps      We will plan for colonoscopy. Return for Colonoscopy. Chief Complaint   Patient presents with    New Patient     Ref from dr Gustavo Lebron for colon screening, last one at [facility]       PCP: Nickolas Harmon DO    HPI: Reggie Apple is a 67 y.o. male who presents in consultation for colonoscopy. He has a history of colon polyps. His last was 3 years ago in [place] Dr. Declan Villanueva. He denies any acute issues. He has no problems with diarrhea or constipation. There is no melena or hematochezia. He has no abdominal pain or unintentional weight loss. There are no bowel caliber changes. There is no family history of colon cancer or inflammatory bowel disease. His father does have a history of colon polyps though    Past Medical History:   Diagnosis Date    Chronic cholecystitis with calculus     Colon polyps     DJD (degenerative joint disease)     Gallstone pancreatitis     GERD (gastroesophageal reflux disease)     peptic reflux disease    Headache     Hyperlipidemia     Hypertension     Necrotizing pancreatitis 09/2021    Osteoarthritis        Past Surgical History:   Procedure Laterality Date    CHOLECYSTECTOMY, LAPAROSCOPIC N/A 9/8/2021    LAP ROBOTIC CHOLECYSTECTOMY WITH INTRAOPERATIVE ULTRASOUND performed by Lyric Meehan MD at [address] [place] [address], COLON, DIAGNOSTIC      KNEE CARTILAGE SURGERY      UPPER GASTROINTESTINAL ENDOSCOPY N/A 8/9/2021    EGD ESOPHAGOGASTRODUODENOSCOPY ULTRASOUND performed by Hal Siddiqi MD at [address] 11/29/2021    EGD ESOPHAGOGASTRODU[facility]       Prior to Admission medications    Medication Sig Start Date End Date Taking?  Authorizing Provider   omeprazole (PRILOSEC) 40 MG delayed release capsule Take 1 capsule by mouth every morning (before breakfast) 11/22/22  Yes Debo Abarca, DO   amLODIPine (NORVASC) 5 MG tablet Take 1 tablet by mouth nightly 11/11/22 2/17/23 Yes Jony Elton, DO   lisinopril-hydroCHLOROthiazide (PRINZIDE;ZESTORETIC) 20-12.5 MG per tablet take 1 tablet by mouth every morning 11/11/22  Yes Jony Elton, DO   lipase-protease-amylase (CREON) 81539-064080 units CPEP delayed release capsule Take 2 capsules by mouth 5 times daily 10/26/21  Yes Marleni Valentin III, MD   Cholecalciferol (VITAMIN D3) 50 MCG (2000 UT) CAPS Take by mouth   Yes Historical Provider, MD   Zinc Sulfate (ZINC 15 PO) Take by mouth   Yes Historical Provider, MD   Multiple Vitamins-Minerals (THERAPEUTIC MULTIVITAMIN-MINERALS) tablet Take 1 tablet by mouth daily   Yes Historical Provider, MD   aspirin 81 MG EC tablet Take 81 mg by mouth daily   Yes Historical Provider, MD       Allergies   Allergen Reactions    Covid-19 (Mrna) Vaccine Swelling     Throat swelling    Penicillins        Social History     Socioeconomic History    Marital status:      Spouse name: None    Number of children: None    Years of education: None    Highest education level: None   Tobacco Use    Smoking status: Never    Smokeless tobacco: Never   Vaping Use    Vaping Use: Never used   Substance and Sexual Activity    Alcohol use: Not Currently     Comment:      Drug use: Never     Social Determinants of Health     Financial Resource Strain: Low Risk     Difficulty of Paying Living Expenses: Not hard at all   Food Insecurity: No Food Insecurity    Worried About Running Out of Food in the Last Year: Never true    Ran Out of Food in the Last Year: Never true       History reviewed. No pertinent family history. Review of Systems   All other systems reviewed and are negative.             Objective:  Vitals:    02/17/23 0923   BP: 135/83   Pulse: 98   SpO2: 100%   Weight: 198 lb (89.8 kg)   Height: 5' 11\" (1.803 m)          Physical Exam  Constitutional:       General: He is not in acute distress. Appearance: He is not diaphoretic. HENT:      Head: Normocephalic and atraumatic. Eyes:      General:         Right eye: No discharge. Left eye: No discharge. Neck:      Trachea: No tracheal deviation. Cardiovascular:      Rate and Rhythm: Normal rate. Pulmonary:      Effort: Pulmonary effort is normal. No respiratory distress. Abdominal:      General: There is no distension. Palpations: Abdomen is soft. Tenderness: There is no abdominal tenderness. There is no guarding or rebound. Skin:     General: Skin is warm and dry. Neurological:      Mental Status: He is alert and oriented to person, place, and time. Loren Moreira MD    I have examined the patient and performed the key aspects of physical exam, reviewed the record (including all pertinent and new radiology images and laboratory findings, referring provider notes and results), and discussed the case with the primary and referring provider where applicable. NOTE: This report, in part or full,may have been transcribed using voice recognition software. Every effort was made to ensure accuracy; however, inadvertent computerized transcription errors may be present. Please excuse any transcriptional grammatical or spelling errors that may have escaped my editorial review.     CC: Tonia Ponce,

## 2023-02-22 ENCOUNTER — PREP FOR PROCEDURE (OUTPATIENT)
Dept: SURGERY | Age: 73
End: 2023-02-22

## 2023-02-22 PROBLEM — Z86.010 HX OF COLONIC POLYP: Status: ACTIVE | Noted: 2023-02-22

## 2023-02-22 PROBLEM — Z86.0100 HX OF COLONIC POLYP: Status: ACTIVE | Noted: 2023-02-22

## 2023-03-14 ENCOUNTER — OFFICE VISIT (OUTPATIENT)
Dept: SURGERY | Age: 73
End: 2023-03-14
Payer: MEDICARE

## 2023-03-14 VITALS
HEIGHT: 71 IN | BODY MASS INDEX: 28.03 KG/M2 | TEMPERATURE: 98.1 F | SYSTOLIC BLOOD PRESSURE: 147 MMHG | DIASTOLIC BLOOD PRESSURE: 86 MMHG | OXYGEN SATURATION: 100 % | WEIGHT: 200.2 LBS | HEART RATE: 94 BPM | RESPIRATION RATE: 18 BRPM

## 2023-03-14 DIAGNOSIS — K86.1 CHRONIC BILIARY PANCREATITIS (HCC): Primary | ICD-10-CM

## 2023-03-14 DIAGNOSIS — K86.89 DILATION OF PANCREATIC DUCT: ICD-10-CM

## 2023-03-14 PROCEDURE — 99213 OFFICE O/P EST LOW 20 MIN: CPT | Performed by: TRANSPLANT SURGERY

## 2023-03-14 PROCEDURE — 1123F ACP DISCUSS/DSCN MKR DOCD: CPT | Performed by: TRANSPLANT SURGERY

## 2023-03-14 PROCEDURE — 3077F SYST BP >= 140 MM HG: CPT | Performed by: TRANSPLANT SURGERY

## 2023-03-14 PROCEDURE — 3079F DIAST BP 80-89 MM HG: CPT | Performed by: TRANSPLANT SURGERY

## 2023-03-14 NOTE — PROGRESS NOTES
Hepatobiliary and Pancreatic Surgery Attending History and Physical    Patient's Name/Date of Birth: Ashtyn Dos Santos /1950 (12 y.o.)    Date: March 14, 2023     CC: Chronic pancreatitis with WON in pancreatic head and pancreatic duct dilation    HPI:  Patient is a very pleasant 70year old male who was diagnosed with acute necrotizing pancreatitis due to SARS-COV-2. His EUS showed 2.8 cm cystic mass at the pancreatic head with persistent pancreatic and peripancreatic inflammation consistent with pancreatitis and pancreatic pseudocyst.  The pancreatic duct was mildly dilated  throughout the pancreas. He had a repeat triphasic CT scan which showed pancreatic head necrosis 9/21. He underwent a laparoscopic robotic cholecystectomy which showed chronic cholecystitis with cholelithiasis. He does have an increased HbA1c. He is taking creon before meals and denies any bloating nor diarrhea. He denies any bloating or diarrhea. Physical Exam:  BP (!) 147/86 (Site: Right Upper Arm, Position: Sitting, Cuff Size: Medium Adult)   Pulse 94   Temp 98.1 °F (36.7 °C)   Resp 18   Ht 5' 11\" (1.803 m)   Wt 200 lb 3.2 oz (90.8 kg)   SpO2 100%   BMI 27.92 kg/m²     PSYCH: mood and affect normal, alert and oriented x 3: No apparent distress, comfortable  EYES: Sclera white, pupils equal round and reactive to light  ENMT:  Hearing normal, trachea midline, ears externally intact  LYMPH: no obvious lympadenopathy in neck. RESP: Respiratory effort was normal with no retractions or use of accessory muscles. CV:  No pedal edema  GI/ Abdomen: Soft, nondistended, nontender, no guarding, no peritoneal signs  MSK: no clubbing/ no cyanosis/ gaitnormal       Assessment/Plan:  Chronic pancreatitis with WON in the pancreatic head with pancreatic duct dilation  - I reviewed their images prior to our office visit and we also reviewed them together today.   - he does have pancreatic duct dilation with WON in the pancreatic head  - continue creon  - repeat MRI in 1 year at [facility] with Body Radiologist to read    20 Minutes of which greater than 50% was spent counseling or coordinating his care. Thank you Dr. Trent Denny for the consultation allowing me to take part in Mr. Mccullough's care.      Bi Wilder M.D.  3/14/2023  8:56 AM

## 2023-04-03 ENCOUNTER — OFFICE VISIT (OUTPATIENT)
Dept: FAMILY MEDICINE CLINIC | Age: 73
End: 2023-04-03
Payer: MEDICARE

## 2023-04-03 VITALS
TEMPERATURE: 98.4 F | DIASTOLIC BLOOD PRESSURE: 78 MMHG | WEIGHT: 192.4 LBS | OXYGEN SATURATION: 98 % | HEART RATE: 95 BPM | SYSTOLIC BLOOD PRESSURE: 122 MMHG | RESPIRATION RATE: 17 BRPM | BODY MASS INDEX: 26.94 KG/M2 | HEIGHT: 71 IN

## 2023-04-03 DIAGNOSIS — R10.13 EPIGASTRIC PAIN: Primary | ICD-10-CM

## 2023-04-03 PROCEDURE — 1123F ACP DISCUSS/DSCN MKR DOCD: CPT | Performed by: FAMILY MEDICINE

## 2023-04-03 PROCEDURE — 99214 OFFICE O/P EST MOD 30 MIN: CPT | Performed by: FAMILY MEDICINE

## 2023-04-03 PROCEDURE — 3078F DIAST BP <80 MM HG: CPT | Performed by: FAMILY MEDICINE

## 2023-04-03 PROCEDURE — 3074F SYST BP LT 130 MM HG: CPT | Performed by: FAMILY MEDICINE

## 2023-04-03 PROCEDURE — 96372 THER/PROPH/DIAG INJ SC/IM: CPT | Performed by: FAMILY MEDICINE

## 2023-04-03 RX ORDER — PANTOPRAZOLE SODIUM 40 MG/1
40 TABLET, DELAYED RELEASE ORAL
Qty: 30 TABLET | Refills: 5 | Status: SHIPPED | OUTPATIENT
Start: 2023-04-03

## 2023-04-03 RX ORDER — PROMETHAZINE HYDROCHLORIDE 25 MG/ML
25 INJECTION, SOLUTION INTRAMUSCULAR; INTRAVENOUS ONCE
Status: COMPLETED | OUTPATIENT
Start: 2023-04-03 | End: 2023-04-03

## 2023-04-03 RX ADMIN — PROMETHAZINE HYDROCHLORIDE 25 MG: 25 INJECTION, SOLUTION INTRAMUSCULAR; INTRAVENOUS at 17:26

## 2023-04-03 NOTE — PROGRESS NOTES
OFFICE NOTE    4/3/23  Name: Eyal Patterson  HTQ:3[birth year]   Sex:male   Age:73 y.o. SUBJECTIVE  Chief Complaint   Patient presents with    Nausea & Vomiting     X 6 days     Gastroesophageal Reflux       HPI Pt reports epigastric pain, nausea, some emesis for almost a week. Had pancreatis when had his GB removed. Esteban Tripathi, likes job. Thought he was getting better but over indulged with large meal of fatty food and had a setback, Has tried a number of things. But more on the order of peptobismol and tums    Review of Systems   No melena, hematachezia, jaundice. No radiation of pain to back.       Current Outpatient Medications:     pantoprazole (PROTONIX) 40 MG tablet, Take 1 tablet by mouth every morning (before breakfast), Disp: 30 tablet, Rfl: 5    omeprazole (PRILOSEC) 40 MG delayed release capsule, Take 1 capsule by mouth every morning (before breakfast), Disp: 90 capsule, Rfl: 1    amLODIPine (NORVASC) 5 MG tablet, Take 1 tablet by mouth nightly, Disp: 90 tablet, Rfl: 1    lisinopril-hydroCHLOROthiazide (PRINZIDE;ZESTORETIC) 20-12.5 MG per tablet, take 1 tablet by mouth every morning, Disp: 90 tablet, Rfl: 1    lipase-protease-amylase (CREON) 63734-779473 units CPEP delayed release capsule, Take 2 capsules by mouth 5 times daily, Disp: 250 capsule, Rfl: 5    Cholecalciferol (VITAMIN D3) 50 MCG (2000 UT) CAPS, Take by mouth, Disp: , Rfl:     Zinc Sulfate (ZINC 15 PO), Take by mouth, Disp: , Rfl:     Multiple Vitamins-Minerals (THERAPEUTIC MULTIVITAMIN-MINERALS) tablet, Take 1 tablet by mouth daily, Disp: , Rfl:     aspirin 81 MG EC tablet, Take 1 tablet by mouth daily, Disp: , Rfl:   Allergies   Allergen Reactions    Covid-19 (Mrna) Vaccine Swelling     Throat swelling    Penicillins        Past Medical History:   Diagnosis Date    Chronic cholecystitis with calculus     Colon polyps     DJD (degenerative joint disease)     Gallstone pancreatitis     GERD (gastroesophageal reflux disease)

## 2023-04-04 DIAGNOSIS — R10.13 EPIGASTRIC PAIN: ICD-10-CM

## 2023-04-04 LAB
ALBUMIN SERPL-MCNC: 3.6 G/DL (ref 3.5–5.2)
ALP SERPL-CCNC: 66 U/L (ref 40–129)
ALT SERPL-CCNC: 20 U/L (ref 0–40)
ANION GAP SERPL CALCULATED.3IONS-SCNC: 14 MMOL/L (ref 7–16)
AST SERPL-CCNC: 30 U/L (ref 0–39)
BILIRUB SERPL-MCNC: 1 MG/DL (ref 0–1.2)
BUN SERPL-MCNC: 21 MG/DL (ref 6–23)
CALCIUM SERPL-MCNC: 9.1 MG/DL (ref 8.6–10.2)
CHLORIDE SERPL-SCNC: 92 MMOL/L (ref 98–107)
CO2 SERPL-SCNC: 29 MMOL/L (ref 22–29)
CREAT SERPL-MCNC: 1 MG/DL (ref 0.7–1.2)
GLUCOSE SERPL-MCNC: 120 MG/DL (ref 74–99)
LIPASE: 91 U/L (ref 13–60)
POTASSIUM SERPL-SCNC: 3.4 MMOL/L (ref 3.5–5)
PROT SERPL-MCNC: 6.7 G/DL (ref 6.4–8.3)
SODIUM SERPL-SCNC: 135 MMOL/L (ref 132–146)

## 2023-04-05 ENCOUNTER — TELEPHONE (OUTPATIENT)
Dept: HEMATOLOGY | Age: 73
End: 2023-04-05

## 2023-04-05 NOTE — TELEPHONE ENCOUNTER
The patient called and stated that over the weekend he wasn't feeling well. On Monday he went to the [place] walk in and had some labs done. He said they informed him that he should call and schedule a follow up with our office. I scheduled the patient for 04/06/2023 [facility] at 2:30. The patient is familiar with our office. He confirmed the above appt.   Electronically signed by Anderson Mohamud MA on 4/5/2023 at 9:49 AM

## 2023-04-06 ENCOUNTER — OFFICE VISIT (OUTPATIENT)
Dept: HEMATOLOGY | Age: 73
End: 2023-04-06
Payer: MEDICARE

## 2023-04-06 VITALS
DIASTOLIC BLOOD PRESSURE: 80 MMHG | WEIGHT: 188 LBS | TEMPERATURE: 98.1 F | HEIGHT: 71 IN | SYSTOLIC BLOOD PRESSURE: 134 MMHG | RESPIRATION RATE: 16 BRPM | HEART RATE: 80 BPM | OXYGEN SATURATION: 97 % | BODY MASS INDEX: 26.32 KG/M2

## 2023-04-06 DIAGNOSIS — R10.10 PAIN OF UPPER ABDOMEN: ICD-10-CM

## 2023-04-06 DIAGNOSIS — K86.3 PANCREATIC PSEUDOCYST: ICD-10-CM

## 2023-04-06 DIAGNOSIS — K86.1 CHRONIC PANCREATITIS, UNSPECIFIED PANCREATITIS TYPE (HCC): Primary | ICD-10-CM

## 2023-04-06 PROCEDURE — 3079F DIAST BP 80-89 MM HG: CPT | Performed by: TRANSPLANT SURGERY

## 2023-04-06 PROCEDURE — 1123F ACP DISCUSS/DSCN MKR DOCD: CPT | Performed by: TRANSPLANT SURGERY

## 2023-04-06 PROCEDURE — 3075F SYST BP GE 130 - 139MM HG: CPT | Performed by: TRANSPLANT SURGERY

## 2023-04-06 PROCEDURE — 99213 OFFICE O/P EST LOW 20 MIN: CPT | Performed by: TRANSPLANT SURGERY

## 2023-04-06 PROCEDURE — 99212 OFFICE O/P EST SF 10 MIN: CPT | Performed by: TRANSPLANT SURGERY

## 2023-04-06 RX ORDER — ONDANSETRON 4 MG/1
4 TABLET, FILM COATED ORAL
Qty: 60 TABLET | Refills: 4 | Status: SHIPPED | OUTPATIENT
Start: 2023-04-06

## 2023-04-06 NOTE — PROGRESS NOTES
Hepatobiliary and Pancreatic Surgery Attending History and Physical    Patient's Name/Date of Birth: Marleny Malik /1950 (89 y.o.)    Date: April 6, 2023     CC: Chronic pancreatitis with WON in pancreatic head and pancreatic duct dilation    HPI:  Patient is a very pleasant 70year old male who had an EUS showed 2.8 cm cystic mass at the pancreatic head with persistent pancreatic and peripancreatic inflammation consistent with pancreatitis and pancreatic pseudocyst in 2021. The pancreatic duct was mildly dilated  throughout the pancreas. He had a repeat triphasic CT scan which showed pancreatic head necrosis 9/21. He underwent a laparoscopic robotic cholecystectomy which showed chronic cholecystitis with cholelithiasis. He recently ate pizza then began to have pain and nausea. The pain is better and now he is only nauseated. His lipase was elevated at 91. Physical Exam:  /80   Pulse 80   Temp 98.1 °F (36.7 °C) (Temporal)   Resp 16   Ht 5' 11\" (1.803 m)   Wt 188 lb (85.3 kg)   SpO2 97%   BMI 26.22 kg/m²     PSYCH: mood and affect normal, alert and oriented x 3: No apparent distress, comfortable  EYES: Sclera white, pupils equal round and reactive to light  ENMT:  Hearing normal, trachea midline, ears externally intact  LYMPH: no obvious lympadenopathy in neck. RESP: Respiratory effort was normal with no retractions or use of accessory muscles. CV:  No pedal edema  GI/ Abdomen: Soft, nondistended, nontender, no guarding, no peritoneal signs  MSK: no clubbing/ no cyanosis/ gaitnormal       Assessment/Plan:  Chronic pancreatitis with WON in the pancreatic head with pancreatic duct dilation  - I reviewed their images prior to our office visit and we also reviewed them together today.   - he does have pancreatic duct dilation with WON in the pancreatic head  - continue creon  - low fat diet of no more than 20g  - CT scan in 6 weeks    20 Minutes of which greater than 50% was spent counseling

## 2023-04-07 ENCOUNTER — TELEPHONE (OUTPATIENT)
Dept: HEMATOLOGY | Age: 73
End: 2023-04-07

## 2023-04-07 NOTE — TELEPHONE ENCOUNTER
The patient is scheduled for Jamie Silver for a follow up on 04/25/23 at 8:30 am. I also did the patients prior auth for his ct scan and it is scanned into the patients chart. The patient is scheduled for his 6 week scan on 05/18/2023   Arrive at 9:30 am. Scan at 10:00 am. Npo starting at 7:00 am. ok for patient to take meds with small amount of water. I called and spoke with the patient and gave him the above time, date, location and instructions for his scan. While on the phone we also made the patient a follow up appt for 05/23/23 9:00 am in [place].  The patient verbalized understanding and confirmed all the above info  Electronically signed by Nia Moran MA on 4/7/2023 at 8:45 AM

## 2023-04-25 ENCOUNTER — OFFICE VISIT (OUTPATIENT)
Dept: SURGERY | Age: 73
End: 2023-04-25
Payer: MEDICARE

## 2023-04-25 VITALS
DIASTOLIC BLOOD PRESSURE: 73 MMHG | TEMPERATURE: 97.7 F | OXYGEN SATURATION: 97 % | BODY MASS INDEX: 24.78 KG/M2 | HEART RATE: 80 BPM | SYSTOLIC BLOOD PRESSURE: 107 MMHG | HEIGHT: 71 IN | WEIGHT: 177 LBS | RESPIRATION RATE: 18 BRPM

## 2023-04-25 DIAGNOSIS — K85.90 ACUTE ON CHRONIC PANCREATITIS (HCC): Primary | ICD-10-CM

## 2023-04-25 DIAGNOSIS — K86.1 ACUTE ON CHRONIC PANCREATITIS (HCC): Primary | ICD-10-CM

## 2023-04-25 PROCEDURE — 1123F ACP DISCUSS/DSCN MKR DOCD: CPT | Performed by: TRANSPLANT SURGERY

## 2023-04-25 PROCEDURE — 3074F SYST BP LT 130 MM HG: CPT | Performed by: TRANSPLANT SURGERY

## 2023-04-25 PROCEDURE — 99213 OFFICE O/P EST LOW 20 MIN: CPT | Performed by: TRANSPLANT SURGERY

## 2023-04-25 PROCEDURE — 3078F DIAST BP <80 MM HG: CPT | Performed by: TRANSPLANT SURGERY

## 2023-04-25 NOTE — PROGRESS NOTES
Hepatobiliary and Pancreatic Surgery Attending History and Physical    Patient's Name/Date of Birth: Ashley Thomas /1950 (25 y.o.)    Date: April 25, 2023     CC: Chronic pancreatitis with WON in pancreatic head and pancreatic duct dilation    HPI:  Patient is a very pleasant 70year old male who had an EUS showed 2.8 cm cystic mass at the pancreatic head with persistent pancreatic and peripancreatic inflammation consistent with pancreatitis and pancreatic pseudocyst in 2021. The pancreatic duct was mildly dilated  throughout the pancreas. He had a repeat triphasic CT scan which showed pancreatic head necrosis 9/21. He underwent a laparoscopic robotic cholecystectomy which showed chronic cholecystitis with cholelithiasis. He recently ate pizza then began to have pain and nausea. The pain is better and now he is only nauseated. His lipase was elevated at 91. He started to feel beter then mowed the grass and was sick for about a week. It happened as soon as he came in. He is now sticking to a low fat diet. His pain is now a discomfort. Physical Exam:  /73 (Site: Left Upper Arm, Position: Sitting, Cuff Size: Large Adult)   Pulse 80   Temp 97.7 °F (36.5 °C)   Resp 18   Ht 5' 11\" (1.803 m)   Wt 177 lb (80.3 kg)   SpO2 97%   BMI 24.69 kg/m²     PSYCH: mood and affect normal, alert and oriented x 3: No apparent distress, comfortable  EYES: Sclera white, pupils equal round and reactive to light  ENMT:  Hearing normal, trachea midline, ears externally intact  LYMPH: no obvious lympadenopathy in neck. RESP: Respiratory effort was normal with no retractions or use of accessory muscles.   CV:  No pedal edema  GI/ Abdomen: Soft, nondistended, nontender, no guarding, no peritoneal signs  MSK: no clubbing/ no cyanosis/ gaitnormal       Assessment/Plan:  Chronic pancreatitis with WON in the pancreatic head with pancreatic duct dilation with acute on chronic pancreatitis  - he does have pancreatic

## 2023-05-10 DIAGNOSIS — I10 BENIGN ESSENTIAL HYPERTENSION: ICD-10-CM

## 2023-05-10 DIAGNOSIS — K21.9 GERD WITHOUT ESOPHAGITIS: ICD-10-CM

## 2023-05-10 RX ORDER — LISINOPRIL AND HYDROCHLOROTHIAZIDE 20; 12.5 MG/1; MG/1
TABLET ORAL
Qty: 90 TABLET | Refills: 1 | Status: SHIPPED | OUTPATIENT
Start: 2023-05-10

## 2023-05-10 RX ORDER — OMEPRAZOLE 40 MG/1
40 CAPSULE, DELAYED RELEASE ORAL
Qty: 90 CAPSULE | Refills: 1 | Status: SHIPPED | OUTPATIENT
Start: 2023-05-10

## 2023-05-17 DIAGNOSIS — K86.3 PANCREATIC PSEUDOCYST: ICD-10-CM

## 2023-05-17 DIAGNOSIS — K21.9 GERD WITHOUT ESOPHAGITIS: Primary | ICD-10-CM

## 2023-05-18 ENCOUNTER — HOSPITAL ENCOUNTER (OUTPATIENT)
Age: 73
Discharge: HOME | End: 2023-05-18
Payer: MEDICARE

## 2023-05-18 ENCOUNTER — HOSPITAL ENCOUNTER (OUTPATIENT)
Dept: CT IMAGING | Age: 73
Discharge: HOME | End: 2023-05-20
Payer: MEDICARE

## 2023-05-18 DIAGNOSIS — K86.3 PANCREATIC PSEUDOCYST: ICD-10-CM

## 2023-05-18 DIAGNOSIS — K86.1 CHRONIC PANCREATITIS, UNSPECIFIED PANCREATITIS TYPE (HCC): ICD-10-CM

## 2023-05-18 DIAGNOSIS — R10.10 PAIN OF UPPER ABDOMEN: ICD-10-CM

## 2023-05-18 DIAGNOSIS — K21.9 GERD WITHOUT ESOPHAGITIS: ICD-10-CM

## 2023-05-18 LAB
ANION GAP SERPL CALCULATED.3IONS-SCNC: 8 MMOL/L (ref 7–16)
BUN SERPL-MCNC: 13 MG/DL (ref 6–23)
CALCIUM SERPL-MCNC: 9.4 MG/DL (ref 8.6–10.2)
CHLORIDE SERPL-SCNC: 105 MMOL/L (ref 98–107)
CO2 SERPL-SCNC: 27 MMOL/L (ref 22–29)
CREAT SERPL-MCNC: 1 MG/DL (ref 0.7–1.2)
GLUCOSE SERPL-MCNC: 94 MG/DL (ref 74–99)
POTASSIUM SERPL-SCNC: 4.4 MMOL/L (ref 3.5–5)
SODIUM SERPL-SCNC: 140 MMOL/L (ref 132–146)

## 2023-05-18 PROCEDURE — 2580000003 HC RX 258: Performed by: RADIOLOGY

## 2023-05-18 PROCEDURE — 74160 CT ABDOMEN W/CONTRAST: CPT

## 2023-05-18 PROCEDURE — 36415 COLL VENOUS BLD VENIPUNCTURE: CPT

## 2023-05-18 PROCEDURE — 80048 BASIC METABOLIC PNL TOTAL CA: CPT

## 2023-05-18 PROCEDURE — 6360000004 HC RX CONTRAST MEDICATION: Performed by: RADIOLOGY

## 2023-05-18 RX ORDER — SODIUM CHLORIDE 0.9 % (FLUSH) 0.9 %
10 SYRINGE (ML) INJECTION PRN
Status: DISCONTINUED | OUTPATIENT
Start: 2023-05-18 | End: 2023-05-21 | Stop reason: HOSPADM

## 2023-05-18 RX ADMIN — SODIUM CHLORIDE, PRESERVATIVE FREE 10 ML: 5 INJECTION INTRAVENOUS at 10:30

## 2023-05-18 RX ADMIN — IOPAMIDOL 75 ML: 755 INJECTION, SOLUTION INTRAVENOUS at 10:30

## 2023-05-23 ENCOUNTER — OFFICE VISIT (OUTPATIENT)
Dept: SURGERY | Age: 73
End: 2023-05-23
Payer: MEDICARE

## 2023-05-23 VITALS
RESPIRATION RATE: 18 BRPM | TEMPERATURE: 97.7 F | HEIGHT: 71 IN | DIASTOLIC BLOOD PRESSURE: 81 MMHG | WEIGHT: 179.6 LBS | SYSTOLIC BLOOD PRESSURE: 129 MMHG | OXYGEN SATURATION: 97 % | HEART RATE: 80 BPM | BODY MASS INDEX: 25.15 KG/M2

## 2023-05-23 DIAGNOSIS — K86.1 CHRONIC CALCIFIC PANCREATITIS (HCC): Primary | ICD-10-CM

## 2023-05-23 PROCEDURE — 3079F DIAST BP 80-89 MM HG: CPT | Performed by: TRANSPLANT SURGERY

## 2023-05-23 PROCEDURE — 1123F ACP DISCUSS/DSCN MKR DOCD: CPT | Performed by: TRANSPLANT SURGERY

## 2023-05-23 PROCEDURE — 3074F SYST BP LT 130 MM HG: CPT | Performed by: TRANSPLANT SURGERY

## 2023-05-23 PROCEDURE — 99213 OFFICE O/P EST LOW 20 MIN: CPT | Performed by: TRANSPLANT SURGERY

## 2023-05-23 NOTE — PROGRESS NOTES
greater than 50% was spent counseling or coordinating his care. Thank you Dr. Judy Blount for the consultation allowing me to take part in Mr. Mccullough's care.      Corinna Aguilar M.D.  5/23/2023  8:56 AM

## 2023-05-31 ENCOUNTER — TELEPHONE (OUTPATIENT)
Dept: SURGERY | Age: 73
End: 2023-05-31

## 2023-05-31 NOTE — TELEPHONE ENCOUNTER
Prior Authorization Form:      DEMOGRAPHICS:                     Patient Name:  Criss Osorio  Patient :  1950            Insurance:  Payor: Celeste Baum / Plan: Phil Del Toro ESSENTIAL/PLUS / Product Type: *No Product type* /   Insurance ID Number:    Payer/Plan Subscr  Sex Relation Sub. Ins. ID Effective Group Num   1.  Georgi A 1950 Male Self [id number]                                    [id number]         DIAGNOSIS & PROCEDURE:                       Procedure/Operation: colon           CPT Code: 30528    Diagnosis:  hx colon polyps    ICD10 Code: z86.010    Location:  [facility]    Surgeon:  Leah Quigley INFORMATION:                          Date: 23    Time: 845am              Anesthesia:  MAC/TIVA                                                       Status:  Outpatient        Special Comments:         Electronically signed by Al Grijalva MA on 2023 at 2:59 PM

## 2023-05-31 NOTE — TELEPHONE ENCOUNTER
Sinai Alirio is scheduled for colon with Dr Rajinder Dhaliwal on 6/29/23 at 845am. Patient needs to be NPO after midnight the night before procedure. All surgery instructions were explained to the patient and a surgery letter was also mailed out. MA informed patient that PAT will also be calling to review pre-op instructions and medications. Patient verbalized understanding.

## 2023-06-09 ENCOUNTER — TELEPHONE (OUTPATIENT)
Dept: HEMATOLOGY | Age: 73
End: 2023-06-09

## 2023-06-09 NOTE — TELEPHONE ENCOUNTER
The patient called, he wanted to know where he should stop with his fat intake. He has been uping to 10 grams weekly. I spoke with Dr Clois Fabry and he said to let the patient know that he should stop at 50 grams of fat weekly. I called the patient and updated him of the above. The patient stated he is feeling really good, but just wasn't sure of what point he should watch his fat intake.  All questions were answered and the patient verbalized understanding  Electronically signed by Iglesia Brown MA on 6/9/2023 at 10:49 AM

## 2023-06-29 ENCOUNTER — HOSPITAL ENCOUNTER (OUTPATIENT)
Age: 73
Setting detail: OUTPATIENT SURGERY
Discharge: HOME | End: 2023-06-29
Attending: SURGERY | Admitting: SURGERY
Payer: MEDICARE

## 2023-06-29 ENCOUNTER — ANESTHESIA (OUTPATIENT)
Dept: ENDOSCOPY | Age: 73
End: 2023-06-29
Payer: MEDICARE

## 2023-06-29 ENCOUNTER — ANESTHESIA EVENT (OUTPATIENT)
Dept: ENDOSCOPY | Age: 73
End: 2023-06-29
Payer: MEDICARE

## 2023-06-29 VITALS
WEIGHT: 170 LBS | TEMPERATURE: 97.3 F | RESPIRATION RATE: 16 BRPM | HEART RATE: 73 BPM | BODY MASS INDEX: 23.8 KG/M2 | SYSTOLIC BLOOD PRESSURE: 116 MMHG | OXYGEN SATURATION: 98 % | HEIGHT: 71 IN | DIASTOLIC BLOOD PRESSURE: 70 MMHG

## 2023-06-29 DIAGNOSIS — Z86.010 HX OF COLONIC POLYP: ICD-10-CM

## 2023-06-29 PROCEDURE — 45380 COLONOSCOPY AND BIOPSY: CPT | Performed by: SURGERY

## 2023-06-29 PROCEDURE — 3700000000 HC ANESTHESIA ATTENDED CARE: Performed by: SURGERY

## 2023-06-29 PROCEDURE — 7100000011 HC PHASE II RECOVERY - ADDTL 15 MIN: Performed by: SURGERY

## 2023-06-29 PROCEDURE — 3609010300 HC COLONOSCOPY W/BIOPSY SINGLE/MULTIPLE: Performed by: SURGERY

## 2023-06-29 PROCEDURE — 2709999900 HC NON-CHARGEABLE SUPPLY: Performed by: SURGERY

## 2023-06-29 PROCEDURE — 2580000003 HC RX 258: Performed by: NURSE ANESTHETIST, CERTIFIED REGISTERED

## 2023-06-29 PROCEDURE — 2500000003 HC RX 250 WO HCPCS: Performed by: NURSE ANESTHETIST, CERTIFIED REGISTERED

## 2023-06-29 PROCEDURE — 7100000010 HC PHASE II RECOVERY - FIRST 15 MIN: Performed by: SURGERY

## 2023-06-29 PROCEDURE — 3700000001 HC ADD 15 MINUTES (ANESTHESIA): Performed by: SURGERY

## 2023-06-29 PROCEDURE — 88305 TISSUE EXAM BY PATHOLOGIST: CPT

## 2023-06-29 PROCEDURE — 6360000002 HC RX W HCPCS: Performed by: NURSE ANESTHETIST, CERTIFIED REGISTERED

## 2023-06-29 RX ORDER — PROPOFOL 10 MG/ML
INJECTION, EMULSION INTRAVENOUS CONTINUOUS PRN
Status: DISCONTINUED | OUTPATIENT
Start: 2023-06-29 | End: 2023-06-29 | Stop reason: SDUPTHER

## 2023-06-29 RX ORDER — SODIUM CHLORIDE 9 MG/ML
INJECTION, SOLUTION INTRAVENOUS CONTINUOUS PRN
Status: DISCONTINUED | OUTPATIENT
Start: 2023-06-29 | End: 2023-06-29 | Stop reason: SDUPTHER

## 2023-06-29 RX ORDER — LIDOCAINE HYDROCHLORIDE 20 MG/ML
INJECTION, SOLUTION INFILTRATION; PERINEURAL PRN
Status: DISCONTINUED | OUTPATIENT
Start: 2023-06-29 | End: 2023-06-29 | Stop reason: SDUPTHER

## 2023-06-29 RX ADMIN — LIDOCAINE HYDROCHLORIDE 100 MG: 20 INJECTION, SOLUTION INFILTRATION; PERINEURAL at 09:08

## 2023-06-29 RX ADMIN — PROPOFOL 250 MCG/KG/MIN: 10 INJECTION, EMULSION INTRAVENOUS at 09:09

## 2023-06-29 RX ADMIN — SODIUM CHLORIDE: 9 INJECTION, SOLUTION INTRAVENOUS at 08:31

## 2023-06-29 ASSESSMENT — LIFESTYLE VARIABLES: SMOKING_STATUS: 0

## 2023-06-29 ASSESSMENT — PAIN - FUNCTIONAL ASSESSMENT: PAIN_FUNCTIONAL_ASSESSMENT: 0-10

## 2023-07-03 NOTE — ANESTHESIA POSTPROCEDURE EVALUATION
Department of Anesthesiology  Postprocedure Note    Patient: Anil Schmidt  MRN: [id number]  YOB: 1950  Date of evaluation: 7/3/2023      Procedure Summary     Date: 06/29/23 Room / Location: [facility] / [facility]    Anesthesia Start: 5553 Anesthesia Stop: 0698    Procedure: COLONOSCOPY WITH BIOPSY Diagnosis:       Hx of colonic polyp      (Hx of colonic polyp [Z86.010])    Surgeons: Theron Nelson MD Responsible Provider: Anaya Peoples MD    Anesthesia Type: MAC ASA Status: 2          Anesthesia Type: MAC    Nicole Phase I: Nicole Score: 10    Nicole Phase II: Nicole Score: 10      Anesthesia Post Evaluation    Patient location during evaluation: PACU  Patient participation: complete - patient participated  Level of consciousness: awake  Airway patency: patent  Nausea & Vomiting: no nausea and no vomiting  Complications: no  Cardiovascular status: hemodynamically stable  Respiratory status: acceptable  Hydration status: stable

## 2023-07-25 ENCOUNTER — OFFICE VISIT (OUTPATIENT)
Dept: PRIMARY CARE CLINIC | Age: 73
End: 2023-07-25
Payer: MEDICARE

## 2023-07-25 VITALS
SYSTOLIC BLOOD PRESSURE: 122 MMHG | OXYGEN SATURATION: 100 % | WEIGHT: 175 LBS | DIASTOLIC BLOOD PRESSURE: 70 MMHG | HEART RATE: 77 BPM | HEIGHT: 71 IN | BODY MASS INDEX: 24.5 KG/M2

## 2023-07-25 DIAGNOSIS — Z00.00 MEDICARE ANNUAL WELLNESS VISIT, SUBSEQUENT: Primary | ICD-10-CM

## 2023-07-25 DIAGNOSIS — R10.32 LEFT GROIN PAIN: ICD-10-CM

## 2023-07-25 DIAGNOSIS — I10 BENIGN ESSENTIAL HYPERTENSION: ICD-10-CM

## 2023-07-25 DIAGNOSIS — R35.1 NOCTURIA: ICD-10-CM

## 2023-07-25 DIAGNOSIS — K21.9 GERD WITHOUT ESOPHAGITIS: ICD-10-CM

## 2023-07-25 PROCEDURE — G0439 PPPS, SUBSEQ VISIT: HCPCS | Performed by: FAMILY MEDICINE

## 2023-07-25 PROCEDURE — 3078F DIAST BP <80 MM HG: CPT | Performed by: FAMILY MEDICINE

## 2023-07-25 PROCEDURE — 3074F SYST BP LT 130 MM HG: CPT | Performed by: FAMILY MEDICINE

## 2023-07-25 PROCEDURE — 1123F ACP DISCUSS/DSCN MKR DOCD: CPT | Performed by: FAMILY MEDICINE

## 2023-07-25 RX ORDER — AMLODIPINE BESYLATE 5 MG/1
5 TABLET ORAL NIGHTLY
Qty: 90 TABLET | Refills: 1 | Status: SHIPPED | OUTPATIENT
Start: 2023-07-25

## 2023-07-25 RX ORDER — LISINOPRIL AND HYDROCHLOROTHIAZIDE 20; 12.5 MG/1; MG/1
TABLET ORAL
Qty: 90 TABLET | Refills: 1 | Status: CANCELLED | OUTPATIENT
Start: 2023-07-25

## 2023-07-25 RX ORDER — TAMSULOSIN HYDROCHLORIDE 0.4 MG/1
0.4 CAPSULE ORAL DAILY
Qty: 90 CAPSULE | Refills: 1 | Status: SHIPPED | OUTPATIENT
Start: 2023-07-25

## 2023-07-25 RX ORDER — OMEPRAZOLE 40 MG/1
40 CAPSULE, DELAYED RELEASE ORAL
Qty: 90 CAPSULE | Refills: 1 | Status: CANCELLED | OUTPATIENT
Start: 2023-07-25

## 2023-07-25 RX ORDER — PANCRELIPASE 36000; 180000; 114000 [USP'U]/1; [USP'U]/1; [USP'U]/1
2 CAPSULE, DELAYED RELEASE PELLETS ORAL
Qty: 250 CAPSULE | Refills: 5 | Status: CANCELLED | OUTPATIENT
Start: 2023-07-25

## 2023-07-25 SDOH — ECONOMIC STABILITY: FOOD INSECURITY: WITHIN THE PAST 12 MONTHS, THE FOOD YOU BOUGHT JUST DIDN'T LAST AND YOU DIDN'T HAVE MONEY TO GET MORE.: NEVER TRUE

## 2023-07-25 SDOH — ECONOMIC STABILITY: HOUSING INSECURITY
IN THE LAST 12 MONTHS, WAS THERE A TIME WHEN YOU DID NOT HAVE A STEADY PLACE TO SLEEP OR SLEPT IN A SHELTER (INCLUDING NOW)?: NO

## 2023-07-25 SDOH — ECONOMIC STABILITY: INCOME INSECURITY: HOW HARD IS IT FOR YOU TO PAY FOR THE VERY BASICS LIKE FOOD, HOUSING, MEDICAL CARE, AND HEATING?: NOT HARD AT ALL

## 2023-07-25 SDOH — ECONOMIC STABILITY: FOOD INSECURITY: WITHIN THE PAST 12 MONTHS, YOU WORRIED THAT YOUR FOOD WOULD RUN OUT BEFORE YOU GOT MONEY TO BUY MORE.: NEVER TRUE

## 2023-07-25 ASSESSMENT — PATIENT HEALTH QUESTIONNAIRE - PHQ9
2. FEELING DOWN, DEPRESSED OR HOPELESS: 0
1. LITTLE INTEREST OR PLEASURE IN DOING THINGS: 0
SUM OF ALL RESPONSES TO PHQ QUESTIONS 1-9: 0
SUM OF ALL RESPONSES TO PHQ9 QUESTIONS 1 & 2: 0

## 2023-07-25 ASSESSMENT — LIFESTYLE VARIABLES
HOW OFTEN DO YOU HAVE A DRINK CONTAINING ALCOHOL: NEVER
HOW MANY STANDARD DRINKS CONTAINING ALCOHOL DO YOU HAVE ON A TYPICAL DAY: PATIENT DOES NOT DRINK

## 2023-07-25 NOTE — PROGRESS NOTES
morning Yes Debo Abarca DO   omeprazole (PRILOSEC) 40 MG delayed release capsule Take 1 capsule by mouth every morning (before breakfast) Yes Debo Abarca DO   lipase-protease-amylase (CREON) 17795-429599 units CPEP delayed release capsule Take 2 capsules by mouth 5 times daily Yes Ligia Chavez III, MD   Cholecalciferol (VITAMIN D3) 50 MCG (2000 UT) CAPS Take by mouth Yes Historical Provider, MD   Zinc Sulfate (ZINC 15 PO) Take by mouth Yes Historical Provider, MD   Multiple Vitamins-Minerals (THERAPEUTIC MULTIVITAMIN-MINERALS) tablet Take 1 tablet by mouth daily Yes Historical Provider, MD   aspirin 81 MG EC tablet Take 1 tablet by mouth daily Yes Historical Provider, MD       CareTeam (Including outside providers/suppliers regularly involved in providing care):   Patient Care Team:  Elaina , DO as PCP - General (Family Medicine)  Elaina , DO as PCP - Empaneled Provider     Reviewed and updated this visit:  Tobacco  Allergies  Meds  Problems  Med Hx  Surg Hx  Soc Hx  Fam Hx
- - -

## 2023-07-26 DIAGNOSIS — I10 BENIGN ESSENTIAL HYPERTENSION: ICD-10-CM

## 2023-07-26 DIAGNOSIS — R73.01 IMPAIRED FASTING GLUCOSE: ICD-10-CM

## 2023-07-26 DIAGNOSIS — E55.9 VITAMIN D INSUFFICIENCY: ICD-10-CM

## 2023-07-26 LAB
ABSOLUTE IMMATURE GRANULOCYTE: <0.03 K/UL (ref 0–0.58)
BASOPHILS ABSOLUTE: 0.04 K/UL (ref 0–0.2)
BASOPHILS RELATIVE PERCENT: 1 % (ref 0–2)
BILIRUBIN URINE: NEGATIVE
CHOLESTEROL: 133 MG/DL
COLOR: YELLOW
COMMENT: NORMAL
EOSINOPHILS ABSOLUTE: 0.15 K/UL (ref 0.05–0.5)
EOSINOPHILS RELATIVE PERCENT: 3 % (ref 0–6)
GLUCOSE URINE: NEGATIVE MG/DL
HBA1C MFR BLD: 5.5 % (ref 4–5.6)
HCT VFR BLD CALC: 40 % (ref 37–54)
HDLC SERPL-MCNC: 32 MG/DL
HEMOGLOBIN: 13.4 G/DL (ref 12.5–16.5)
IMMATURE GRANULOCYTES: 0 % (ref 0–5)
KETONES, URINE: NEGATIVE MG/DL
LDL CHOLESTEROL: 78 MG/DL
LEUKOCYTE ESTERASE, URINE: NEGATIVE
LYMPHOCYTES ABSOLUTE: 0.7 K/UL (ref 1.5–4)
LYMPHOCYTES RELATIVE PERCENT: 15 % (ref 20–42)
MCH RBC QN AUTO: 32.4 PG (ref 26–35)
MCHC RBC AUTO-ENTMCNC: 33.5 G/DL (ref 32–34.5)
MCV RBC AUTO: 96.9 FL (ref 80–99.9)
MONOCYTES ABSOLUTE: 0.39 K/UL (ref 0.1–0.95)
MONOCYTES RELATIVE PERCENT: 9 % (ref 2–12)
NEUTROPHILS ABSOLUTE: 3.3 K/UL (ref 1.8–7.3)
NEUTROPHILS RELATIVE PERCENT: 72 % (ref 43–80)
NITRITE, URINE: NEGATIVE
PDW BLD-RTO: 13 % (ref 11.5–15)
PH UA: 7.5 (ref 5–9)
PLATELET, FLUORESCENCE: 122 K/UL (ref 130–450)
PMV BLD AUTO: 11.1 FL (ref 7–12)
PROTEIN UA: NEGATIVE MG/DL
RBC # BLD: 4.13 M/UL (ref 3.8–5.8)
SPECIFIC GRAVITY UA: 1.01 (ref 1–1.03)
TRIGL SERPL-MCNC: 117 MG/DL
TSH SERPL DL<=0.05 MIU/L-ACNC: 0.39 UIU/ML (ref 0.27–4.2)
TURBIDITY: CLEAR
URIC ACID: 7.7 MG/DL (ref 3.4–7)
URINE HGB: NEGATIVE
UROBILINOGEN, URINE: 1 EU/DL (ref 0–1)
VITAMIN D 25-HYDROXY: 37.4 NG/ML (ref 30–100)
VLDLC SERPL CALC-MCNC: 23 MG/DL
WBC # BLD: 4.6 K/UL (ref 4.5–11.5)

## 2023-08-22 ENCOUNTER — OFFICE VISIT (OUTPATIENT)
Dept: PODIATRY | Age: 73
End: 2023-08-22

## 2023-08-22 DIAGNOSIS — L84 CORNS AND CALLOSITIES: ICD-10-CM

## 2023-08-22 DIAGNOSIS — M77.41 METATARSALGIA OF BOTH FEET: ICD-10-CM

## 2023-08-22 DIAGNOSIS — M79.672 BILATERAL FOOT PAIN: ICD-10-CM

## 2023-08-22 DIAGNOSIS — M77.42 METATARSALGIA OF BOTH FEET: ICD-10-CM

## 2023-08-22 DIAGNOSIS — B35.1 TINEA UNGUIUM: Primary | ICD-10-CM

## 2023-08-22 DIAGNOSIS — M79.671 BILATERAL FOOT PAIN: ICD-10-CM

## 2023-08-22 RX ORDER — AMMONIUM LACTATE 12 G/100G
LOTION TOPICAL
Qty: 400 G | Refills: 2 | Status: SHIPPED | OUTPATIENT
Start: 2023-08-22

## 2023-08-22 NOTE — PROGRESS NOTES
New patient in office c/o nail fungus and pain to second toenails bilat feet. Aristeo Mazariegos DO last seen 2023.       23  Gustabo Pierre : 1950 Sex: male  Age: 68 y.o. Patient was referred by Aristeo Mazariegos DO    CC:    Callus tissue on the bottom both feet  Tenderness due to length and thickness toenail left and right foot second toe    HPI:   This pleasant 68-year-old male patient referred me today for callus tissue on the bottom both feet as well as thickened and elongated toenails on the second toe of left and right foot. No recent injury or trauma. Does have difficulty managing toenails on the second toe left and right foot due to thickness and length. No recent injury or trauma. Does have a history of foot pain. History of inserts. Does also have callus tissue on the bottom of both feet. No recent formal treatment or therapy. No additional pedal complaints at this time.     ROS:  Const: Denies constitutional symptoms  Musculo: Denies symptoms other than stated above  Skin: Denies symptoms other than stated above       Current Outpatient Medications:     ammonium lactate (LAC-HYDRIN) 12 % lotion, Apply topically twice daily, Disp: 400 g, Rfl: 2    amLODIPine (NORVASC) 5 MG tablet, Take 1 tablet by mouth nightly, Disp: 90 tablet, Rfl: 1    tamsulosin (FLOMAX) 0.4 MG capsule, Take 1 capsule by mouth daily, Disp: 90 capsule, Rfl: 1    lisinopril-hydroCHLOROthiazide (PRINZIDE;ZESTORETIC) 20-12.5 MG per tablet, take 1 tablet by mouth every morning, Disp: 90 tablet, Rfl: 1    omeprazole (PRILOSEC) 40 MG delayed release capsule, Take 1 capsule by mouth every morning (before breakfast), Disp: 90 capsule, Rfl: 1    lipase-protease-amylase (CREON) 19476-006747 units CPEP delayed release capsule, Take 2 capsules by mouth 5 times daily, Disp: 250 capsule, Rfl: 5    Cholecalciferol (VITAMIN D3) 50 MCG (2000) CAPS, Take by mouth, Disp: , Rfl:     Zinc Sulfate (ZINC 15 PO), Take by mouth,

## 2023-11-03 ENCOUNTER — PROCEDURE VISIT (OUTPATIENT)
Dept: PODIATRY | Age: 73
End: 2023-11-03
Payer: MEDICARE

## 2023-11-03 DIAGNOSIS — L84 CORNS AND CALLOSITIES: ICD-10-CM

## 2023-11-03 DIAGNOSIS — G60.8 HEREDITARY SENSORY NEUROPATHY: ICD-10-CM

## 2023-11-03 DIAGNOSIS — M79.671 BILATERAL FOOT PAIN: ICD-10-CM

## 2023-11-03 DIAGNOSIS — M79.672 BILATERAL FOOT PAIN: ICD-10-CM

## 2023-11-03 DIAGNOSIS — B35.1 TINEA UNGUIUM: Primary | ICD-10-CM

## 2023-11-03 DIAGNOSIS — M77.41 METATARSALGIA OF BOTH FEET: ICD-10-CM

## 2023-11-03 DIAGNOSIS — M77.42 METATARSALGIA OF BOTH FEET: ICD-10-CM

## 2023-11-03 PROCEDURE — 11056 PARNG/CUTG B9 HYPRKR LES 2-4: CPT | Performed by: PODIATRIST

## 2023-11-03 PROCEDURE — 11721 DEBRIDE NAIL 6 OR MORE: CPT | Performed by: PODIATRIST

## 2023-11-03 NOTE — PROGRESS NOTES
Howard Cao is here today for nail care. his PCP is Arianna Aguilar DO last OV was 07/25/2023. CC:    Follow-up callus tissue both feet and elongated toenails    HPI:   Presents today follow-up callus tissue both feet and elongated toenails both feet. No open wounds no recent injury. No significant foot or ankle pain today. Pleased with progression. Does have ammonium lactate lotion at home. No additional pedal complaints today.     ROS:  Const: Denies constitutional symptoms  Musculo: Denies symptoms other than stated above  Skin: Denies symptoms other than stated above       Current Outpatient Medications:     lisinopril-hydroCHLOROthiazide (PRINZIDE;ZESTORETIC) 20-12.5 MG per tablet, take 1 tablet by mouth every morning, Disp: 90 tablet, Rfl: 1    omeprazole (PRILOSEC) 40 MG delayed release capsule, Take 1 capsule by mouth every morning (before breakfast), Disp: 90 capsule, Rfl: 1    ammonium lactate (LAC-HYDRIN) 12 % lotion, Apply topically twice daily, Disp: 400 g, Rfl: 2    amLODIPine (NORVASC) 5 MG tablet, Take 1 tablet by mouth nightly, Disp: 90 tablet, Rfl: 1    tamsulosin (FLOMAX) 0.4 MG capsule, Take 1 capsule by mouth daily, Disp: 90 capsule, Rfl: 1    lipase-protease-amylase (CREON) 03281-518027 units CPEP delayed release capsule, Take 2 capsules by mouth 5 times daily, Disp: 250 capsule, Rfl: 5    Cholecalciferol (VITAMIN D3) 50 MCG (2000 UT) CAPS, Take by mouth, Disp: , Rfl:     Zinc Sulfate (ZINC 15 PO), Take by mouth, Disp: , Rfl:     Multiple Vitamins-Minerals (THERAPEUTIC MULTIVITAMIN-MINERALS) tablet, Take 1 tablet by mouth daily, Disp: , Rfl:     aspirin 81 MG EC tablet, Take 1 tablet by mouth daily, Disp: , Rfl:   Allergies   Allergen Reactions    Covid-19 (Mrna) Vaccine Swelling     Throat swelling    Penicillins        Past Medical History:   Diagnosis Date    Chronic cholecystitis with calculus     Colon polyps     DJD (degenerative joint disease)     Gallstone pancreatitis

## 2023-11-08 DIAGNOSIS — K21.9 GERD WITHOUT ESOPHAGITIS: ICD-10-CM

## 2023-11-08 DIAGNOSIS — I10 BENIGN ESSENTIAL HYPERTENSION: ICD-10-CM

## 2023-11-08 RX ORDER — LISINOPRIL AND HYDROCHLOROTHIAZIDE 20; 12.5 MG/1; MG/1
TABLET ORAL
Qty: 90 TABLET | Refills: 1 | Status: SHIPPED | OUTPATIENT
Start: 2023-11-08

## 2023-11-08 RX ORDER — OMEPRAZOLE 40 MG/1
40 CAPSULE, DELAYED RELEASE ORAL
Qty: 90 CAPSULE | Refills: 1 | Status: SHIPPED | OUTPATIENT
Start: 2023-11-08

## 2023-12-13 ENCOUNTER — TELEPHONE (OUTPATIENT)
Dept: HEMATOLOGY | Age: 73
End: 2023-12-13

## 2023-12-13 NOTE — TELEPHONE ENCOUNTER
The patient called and stated that his creon is on backorder and he wouldn't be able to refill it until the start of January 2024. So I called the following in to [address] [place] [address] 36,000 unit capsules  Take 2 capsules by mouth twice daily  #56  No refills    I used the [employer] coupon with the following information  Juliana Gray [id number]  RxPCN OHS  RxGRP [id number]  [id number]  Suf 01    The patient called and stated that he did call [facility] and the medication went through with no co-pay.   Electronically signed by Hugh Jewell MA on 12/13/2023 at 7:27 AM

## 2024-01-31 ENCOUNTER — OFFICE VISIT (OUTPATIENT)
Dept: PRIMARY CARE CLINIC | Age: 74
End: 2024-01-31
Payer: MEDICARE

## 2024-01-31 ENCOUNTER — TELEPHONE (OUTPATIENT)
Dept: PRIMARY CARE CLINIC | Age: 74
End: 2024-01-31

## 2024-01-31 VITALS
HEART RATE: 90 BPM | DIASTOLIC BLOOD PRESSURE: 76 MMHG | BODY MASS INDEX: 24.64 KG/M2 | WEIGHT: 176 LBS | TEMPERATURE: 98.1 F | OXYGEN SATURATION: 100 % | HEIGHT: 71 IN | SYSTOLIC BLOOD PRESSURE: 118 MMHG

## 2024-01-31 DIAGNOSIS — E78.2 MIXED HYPERLIPIDEMIA: ICD-10-CM

## 2024-01-31 DIAGNOSIS — Z12.5 SCREENING FOR PROSTATE CANCER: ICD-10-CM

## 2024-01-31 DIAGNOSIS — E55.9 VITAMIN D INSUFFICIENCY: ICD-10-CM

## 2024-01-31 DIAGNOSIS — K86.1 CHRONIC CALCIFIC PANCREATITIS (HCC): ICD-10-CM

## 2024-01-31 DIAGNOSIS — I10 BENIGN ESSENTIAL HYPERTENSION: Primary | ICD-10-CM

## 2024-01-31 DIAGNOSIS — F41.1 GENERALIZED ANXIETY DISORDER: ICD-10-CM

## 2024-01-31 DIAGNOSIS — R73.01 IMPAIRED FASTING GLUCOSE: ICD-10-CM

## 2024-01-31 DIAGNOSIS — I10 BENIGN ESSENTIAL HYPERTENSION: ICD-10-CM

## 2024-01-31 PROCEDURE — 99214 OFFICE O/P EST MOD 30 MIN: CPT | Performed by: FAMILY MEDICINE

## 2024-01-31 PROCEDURE — 3074F SYST BP LT 130 MM HG: CPT | Performed by: FAMILY MEDICINE

## 2024-01-31 PROCEDURE — 3078F DIAST BP <80 MM HG: CPT | Performed by: FAMILY MEDICINE

## 2024-01-31 PROCEDURE — 1123F ACP DISCUSS/DSCN MKR DOCD: CPT | Performed by: FAMILY MEDICINE

## 2024-01-31 RX ORDER — SERTRALINE HYDROCHLORIDE 25 MG/1
25 TABLET, FILM COATED ORAL DAILY
Qty: 30 TABLET | Refills: 5 | Status: SHIPPED | OUTPATIENT
Start: 2024-01-31

## 2024-01-31 RX ORDER — AMLODIPINE BESYLATE 5 MG/1
5 TABLET ORAL NIGHTLY
Qty: 90 TABLET | Refills: 1 | Status: SHIPPED | OUTPATIENT
Start: 2024-01-31

## 2024-01-31 ASSESSMENT — ENCOUNTER SYMPTOMS
ABDOMINAL PAIN: 0
NAUSEA: 0
BACK PAIN: 0
VOMITING: 0
SHORTNESS OF BREATH: 0
CONSTIPATION: 0
COUGH: 0
DIARRHEA: 0
WHEEZING: 0

## 2024-01-31 ASSESSMENT — PATIENT HEALTH QUESTIONNAIRE - PHQ9
2. FEELING DOWN, DEPRESSED OR HOPELESS: 0
SUM OF ALL RESPONSES TO PHQ QUESTIONS 1-9: 0
1. LITTLE INTEREST OR PLEASURE IN DOING THINGS: 0
SUM OF ALL RESPONSES TO PHQ QUESTIONS 1-9: 0
SUM OF ALL RESPONSES TO PHQ QUESTIONS 1-9: 0
SUM OF ALL RESPONSES TO PHQ9 QUESTIONS 1 & 2: 0
SUM OF ALL RESPONSES TO PHQ QUESTIONS 1-9: 0

## 2024-01-31 NOTE — PROGRESS NOTES
24  Chetan Mccullough : 1950 Sex: male  Age: 73 y.o.    Chief Complaint   Patient presents with    Hypertension    Anxiety     Wife states any little event triggers this      HPI:  73 y.o. male patient presents today for 6 month(s) follow up of chronic medical conditions, medication refills and FBW.  Patient's chart, medical, surgical and medication history all reviewed.    Hypertension   The patient presents today for follow up of HTN.  The problem is well controlled. Risk factors for coronary artery disease include Age > 30, male, HTN and elevated cholesterol. Current treatments include amlodipine (Norvasc), hydrochlorothiazide (HCTZ) and lisinopril (Prinivil). The patient is compliant all of the time.  Lifestyle changes the patient has made include dietary improvement(s) and weight management.  Today the patient is complaining of none.      Hyperlipidemia  The 10-year ASCVD risk score (Jose PANDA, et al., 2019) is: 22.3%    Values used to calculate the score:      Age: 73 years      Sex: Male      Is Non- : No      Diabetic: No      Tobacco smoker: No      Systolic Blood Pressure: 118 mmHg      Is BP treated: Yes      HDL Cholesterol: 32 mg/dL      Total Cholesterol: 133 mg/dL    Anxiety  Patient complains of evaluation of anxiety disorder.  He has the following anxiety symptoms: feelings of losing control, irritable. Onset of symptoms was approximately several months ago, gradually worsening since that time. He denies current suicidal and homicidal ideation. Previous treatment includes  none  and  no therapy .  He complains of the following side effects from the treatment: none.    ROS:  Review of Systems   Constitutional:  Negative for chills, fatigue and fever.   Respiratory:  Negative for cough, shortness of breath and wheezing.    Cardiovascular:  Negative for chest pain and palpitations.   Gastrointestinal:  Negative for abdominal pain, constipation, diarrhea, nausea and

## 2024-01-31 NOTE — TELEPHONE ENCOUNTER
Could you put lab orders in for him, he walked out of the lab this morning without doing them, was ordered a CBC with auto diff, HGB A1C, CMP, LIPIDS, TSH, PSA, VIT D 25-OH, URINE, URIC ACID.

## 2024-02-01 ENCOUNTER — TELEPHONE (OUTPATIENT)
Dept: PRIMARY CARE CLINIC | Age: 74
End: 2024-02-01

## 2024-02-01 DIAGNOSIS — E55.9 VITAMIN D INSUFFICIENCY: ICD-10-CM

## 2024-02-01 DIAGNOSIS — I10 BENIGN ESSENTIAL HYPERTENSION: ICD-10-CM

## 2024-02-01 DIAGNOSIS — R73.01 IMPAIRED FASTING GLUCOSE: ICD-10-CM

## 2024-02-01 DIAGNOSIS — Z12.5 SCREENING FOR PROSTATE CANCER: ICD-10-CM

## 2024-02-01 DIAGNOSIS — I10 BENIGN ESSENTIAL HYPERTENSION: Primary | ICD-10-CM

## 2024-02-01 DIAGNOSIS — E78.2 MIXED HYPERLIPIDEMIA: ICD-10-CM

## 2024-02-01 DIAGNOSIS — R97.20 ELEVATED PSA: ICD-10-CM

## 2024-02-01 LAB
ABSOLUTE IMMATURE GRANULOCYTE: <0.03 K/UL (ref 0–0.58)
ALBUMIN SERPL-MCNC: 4.3 G/DL (ref 3.5–5.2)
ALP BLD-CCNC: 64 U/L (ref 40–129)
ALT SERPL-CCNC: 22 U/L (ref 0–40)
ANION GAP SERPL CALCULATED.3IONS-SCNC: 12 MMOL/L (ref 7–16)
AST SERPL-CCNC: 21 U/L (ref 0–39)
BASOPHILS ABSOLUTE: 0.04 K/UL (ref 0–0.2)
BASOPHILS RELATIVE PERCENT: 1 % (ref 0–2)
BILIRUB SERPL-MCNC: 0.6 MG/DL (ref 0–1.2)
BILIRUBIN URINE: NEGATIVE
BUN BLDV-MCNC: 19 MG/DL (ref 6–23)
CALCIUM SERPL-MCNC: 9.8 MG/DL (ref 8.6–10.2)
CHLORIDE BLD-SCNC: 102 MMOL/L (ref 98–107)
CHOLESTEROL: 137 MG/DL
CO2: 25 MMOL/L (ref 22–29)
COLOR: YELLOW
COMMENT: NORMAL
CREAT SERPL-MCNC: 1.2 MG/DL (ref 0.7–1.2)
EOSINOPHILS ABSOLUTE: 0.11 K/UL (ref 0.05–0.5)
EOSINOPHILS RELATIVE PERCENT: 2 % (ref 0–6)
GFR SERPL CREATININE-BSD FRML MDRD: >60 ML/MIN/1.73M2
GLUCOSE BLD-MCNC: 120 MG/DL (ref 74–99)
GLUCOSE URINE: NEGATIVE MG/DL
HBA1C MFR BLD: 5.5 % (ref 4–5.6)
HCT VFR BLD CALC: 42.6 % (ref 37–54)
HDLC SERPL-MCNC: 39 MG/DL
HEMOGLOBIN: 14.7 G/DL (ref 12.5–16.5)
IMMATURE GRANULOCYTES: 0 % (ref 0–5)
KETONES, URINE: NEGATIVE MG/DL
LDL CHOLESTEROL: 80 MG/DL
LEUKOCYTE ESTERASE, URINE: NEGATIVE
LYMPHOCYTES ABSOLUTE: 0.75 K/UL (ref 1.5–4)
LYMPHOCYTES RELATIVE PERCENT: 17 % (ref 20–42)
MCH RBC QN AUTO: 33.1 PG (ref 26–35)
MCHC RBC AUTO-ENTMCNC: 34.5 G/DL (ref 32–34.5)
MCV RBC AUTO: 95.9 FL (ref 80–99.9)
MONOCYTES ABSOLUTE: 0.42 K/UL (ref 0.1–0.95)
MONOCYTES RELATIVE PERCENT: 9 % (ref 2–12)
NEUTROPHILS ABSOLUTE: 3.19 K/UL (ref 1.8–7.3)
NEUTROPHILS RELATIVE PERCENT: 71 % (ref 43–80)
NITRITE, URINE: NEGATIVE
PDW BLD-RTO: 12.7 % (ref 11.5–15)
PH UA: 7 (ref 5–9)
PLATELET # BLD: 119 K/UL (ref 130–450)
PMV BLD AUTO: 10.6 FL (ref 7–12)
POTASSIUM SERPL-SCNC: 4.7 MMOL/L (ref 3.5–5)
PROSTATE SPECIFIC ANTIGEN: 1.29 NG/ML (ref 0–4)
PROTEIN UA: NEGATIVE MG/DL
RBC # BLD: 4.44 M/UL (ref 3.8–5.8)
SODIUM BLD-SCNC: 139 MMOL/L (ref 132–146)
SPECIFIC GRAVITY UA: 1.01 (ref 1–1.03)
TOTAL PROTEIN: 6.9 G/DL (ref 6.4–8.3)
TRIGL SERPL-MCNC: 92 MG/DL
TSH SERPL DL<=0.05 MIU/L-ACNC: 0.65 UIU/ML (ref 0.27–4.2)
TURBIDITY: CLEAR
URIC ACID: 7.9 MG/DL (ref 3.4–7)
URINE HGB: NEGATIVE
UROBILINOGEN, URINE: 1 EU/DL (ref 0–1)
VITAMIN D 25-HYDROXY: 37.7 NG/ML (ref 30–100)
VLDLC SERPL CALC-MCNC: 18 MG/DL
WBC # BLD: 4.5 K/UL (ref 4.5–11.5)

## 2024-02-01 NOTE — TELEPHONE ENCOUNTER
Pt calling asking if it is okay and if he should get a flu shot or any other shots you recommend.

## 2024-02-01 NOTE — TELEPHONE ENCOUNTER
Yes, OK to get flu shot.  I also recommend Shingles vaccines for patients over 49 yo who had Chickenpox as a kid

## 2024-02-13 ENCOUNTER — TELEPHONE (OUTPATIENT)
Dept: HEMATOLOGY | Age: 74
End: 2024-02-13

## 2024-02-13 DIAGNOSIS — K86.1 CHRONIC PANCREATITIS, UNSPECIFIED PANCREATITIS TYPE (HCC): Primary | ICD-10-CM

## 2024-02-13 NOTE — TELEPHONE ENCOUNTER
MRI abdomen approved through [facility]. Auth# [id number] Valid 2/7/24 to 3/23/24.   Pt is scheduled at [facility] 3/13/24. Arrive 830am, scan 9am, NPO 4-6 hours. Pt will need new labs completed prior to scan. Order in Epic.    Called Rx into [facility] in [place].   Diazepam 5mg tablet. Dispense 2 tablets. Zero refills. Take two tablets 1 hour prior to procedure for Claustrophobia.

## 2024-03-01 DIAGNOSIS — K86.1 CHRONIC PANCREATITIS, UNSPECIFIED PANCREATITIS TYPE (HCC): ICD-10-CM

## 2024-03-01 LAB
BUN BLDV-MCNC: 14 MG/DL (ref 6–23)
CALCIUM SERPL-MCNC: 9.7 MG/DL (ref 8.6–10.2)
CHLORIDE BLD-SCNC: 99 MMOL/L (ref 98–107)
CO2: 27 MMOL/L (ref 22–29)
CREAT SERPL-MCNC: 1.1 MG/DL (ref 0.7–1.2)
GFR SERPL CREATININE-BSD FRML MDRD: >60 ML/MIN/1.73M2
GLUCOSE BLD-MCNC: 102 MG/DL (ref 74–99)
POTASSIUM SERPL-SCNC: 4.3 MMOL/L (ref 3.5–5)
SODIUM BLD-SCNC: 138 MMOL/L (ref 132–146)

## 2024-03-13 ENCOUNTER — HOSPITAL ENCOUNTER (OUTPATIENT)
Dept: MRI IMAGING | Age: 74
Discharge: HOME | End: 2024-03-15
Attending: TRANSPLANT SURGERY
Payer: MEDICARE

## 2024-03-13 DIAGNOSIS — K86.1 CHRONIC BILIARY PANCREATITIS (HCC): ICD-10-CM

## 2024-03-13 DIAGNOSIS — K86.89 DILATION OF PANCREATIC DUCT: ICD-10-CM

## 2024-03-13 PROCEDURE — A9577 INJ MULTIHANCE: HCPCS | Performed by: RADIOLOGY

## 2024-03-13 PROCEDURE — 74183 MRI ABD W/O CNTR FLWD CNTR: CPT

## 2024-03-13 PROCEDURE — 6360000004 HC RX CONTRAST MEDICATION: Performed by: RADIOLOGY

## 2024-03-13 RX ADMIN — GADOBENATE DIMEGLUMINE 16 ML: 529 INJECTION, SOLUTION INTRAVENOUS at 09:29

## 2024-03-26 ENCOUNTER — OFFICE VISIT (OUTPATIENT)
Dept: SURGERY | Age: 74
End: 2024-03-26
Payer: MEDICARE

## 2024-03-26 VITALS
HEART RATE: 90 BPM | HEIGHT: 71 IN | OXYGEN SATURATION: 100 % | SYSTOLIC BLOOD PRESSURE: 129 MMHG | WEIGHT: 181 LBS | BODY MASS INDEX: 25.34 KG/M2 | DIASTOLIC BLOOD PRESSURE: 76 MMHG | TEMPERATURE: 98.1 F | RESPIRATION RATE: 16 BRPM

## 2024-03-26 DIAGNOSIS — K86.1 CHRONIC CALCIFIC PANCREATITIS (HCC): Primary | ICD-10-CM

## 2024-03-26 DIAGNOSIS — K86.89 DILATION OF PANCREATIC DUCT: ICD-10-CM

## 2024-03-26 PROCEDURE — 99213 OFFICE O/P EST LOW 20 MIN: CPT | Performed by: TRANSPLANT SURGERY

## 2024-03-26 PROCEDURE — 1123F ACP DISCUSS/DSCN MKR DOCD: CPT | Performed by: TRANSPLANT SURGERY

## 2024-03-26 PROCEDURE — 3078F DIAST BP <80 MM HG: CPT | Performed by: TRANSPLANT SURGERY

## 2024-03-26 PROCEDURE — 3074F SYST BP LT 130 MM HG: CPT | Performed by: TRANSPLANT SURGERY

## 2024-03-26 NOTE — PROGRESS NOTES
Hepatobiliary and Pancreatic Surgery Attending History and Physical    Patient's Name/Date of Birth: Chetan NINA Jamel /1950 (74 y.o.)    Date: March 26, 2024     CC: Chronic calcific pancreatitis with WON in pancreatic head and pancreatic duct dilation    HPI:  Patient is a very pleasant 71 year old male who had an EUS showed 2.8 cm cystic mass at the pancreatic head with persistent pancreatic and peripancreatic inflammation consistent with pancreatitis and pancreatic pseudocyst in 2021.  The pancreatic duct was mildly dilated  throughout the pancreas.  He had a repeat triphasic CT scan which showed pancreatic head necrosis 9/21. He underwent a laparoscopic robotic cholecystectomy which showed chronic cholecystitis with cholelithiasis.     Since his most recent bout of pancreatitis in 4/23 he is watching his diet.  He is also staying hydrated.  He denies any abdominal pain currently nor nausea.  He is sticking to a low-fat diet of 50 g of fat per day.  He has not had any recurrent bouts of pancreatitis since then.  Patient states that he does believe he has a left inguinal hernia.  It usually does not bother him however with increasing physical activity he does have some abdominal/inguinal discomfort.  On a previous CT scan that he had in 2023 does show bilateral inguinal hernias.    Physical Exam:  /76 (Site: Left Upper Arm, Position: Sitting, Cuff Size: Large Adult)   Pulse 90   Temp 98.1 °F (36.7 °C)   Resp 16   Ht 1.803 m (5' 11\")   Wt 82.1 kg (181 lb)   SpO2 100%   BMI 25.24 kg/m²       PSYCH: mood and affect normal, alert and oriented x 3: No apparent distress, comfortable  EYES: Sclera white, pupils equal round and reactive to light  ENMT:  Hearing normal, trachea midline, ears externally intact  LYMPH: no obvious lympadenopathy in neck.   RESP: Respiratory effort was normal with no retractions or use of accessory muscles.  CV:  No pedal edema  GI/ Abdomen: Soft, nondistended, nontender, no

## 2024-05-09 DIAGNOSIS — I10 BENIGN ESSENTIAL HYPERTENSION: ICD-10-CM

## 2024-05-09 DIAGNOSIS — K21.9 GERD WITHOUT ESOPHAGITIS: ICD-10-CM

## 2024-05-09 RX ORDER — OMEPRAZOLE 40 MG/1
40 CAPSULE, DELAYED RELEASE ORAL
Qty: 90 CAPSULE | Refills: 1 | Status: SHIPPED | OUTPATIENT
Start: 2024-05-09

## 2024-05-09 RX ORDER — LISINOPRIL AND HYDROCHLOROTHIAZIDE 20; 12.5 MG/1; MG/1
TABLET ORAL
Qty: 90 TABLET | Refills: 1 | Status: SHIPPED | OUTPATIENT
Start: 2024-05-09

## 2024-07-31 ENCOUNTER — OFFICE VISIT (OUTPATIENT)
Dept: PRIMARY CARE CLINIC | Age: 74
End: 2024-07-31
Payer: MEDICARE

## 2024-07-31 VITALS
HEART RATE: 75 BPM | WEIGHT: 185 LBS | OXYGEN SATURATION: 98 % | DIASTOLIC BLOOD PRESSURE: 62 MMHG | TEMPERATURE: 97.7 F | HEIGHT: 71 IN | SYSTOLIC BLOOD PRESSURE: 124 MMHG | BODY MASS INDEX: 25.9 KG/M2

## 2024-07-31 DIAGNOSIS — K21.9 GERD WITHOUT ESOPHAGITIS: ICD-10-CM

## 2024-07-31 DIAGNOSIS — R35.1 BENIGN PROSTATIC HYPERPLASIA WITH NOCTURIA: ICD-10-CM

## 2024-07-31 DIAGNOSIS — I10 BENIGN ESSENTIAL HYPERTENSION: ICD-10-CM

## 2024-07-31 DIAGNOSIS — N40.1 BENIGN PROSTATIC HYPERPLASIA WITH NOCTURIA: ICD-10-CM

## 2024-07-31 DIAGNOSIS — Z00.00 MEDICARE ANNUAL WELLNESS VISIT, SUBSEQUENT: Primary | ICD-10-CM

## 2024-07-31 PROCEDURE — 3078F DIAST BP <80 MM HG: CPT | Performed by: FAMILY MEDICINE

## 2024-07-31 PROCEDURE — 1123F ACP DISCUSS/DSCN MKR DOCD: CPT | Performed by: FAMILY MEDICINE

## 2024-07-31 PROCEDURE — G0439 PPPS, SUBSEQ VISIT: HCPCS | Performed by: FAMILY MEDICINE

## 2024-07-31 PROCEDURE — 3074F SYST BP LT 130 MM HG: CPT | Performed by: FAMILY MEDICINE

## 2024-07-31 RX ORDER — AMLODIPINE BESYLATE 5 MG/1
5 TABLET ORAL NIGHTLY
Qty: 90 TABLET | Refills: 1 | Status: SHIPPED | OUTPATIENT
Start: 2024-07-31

## 2024-07-31 RX ORDER — FINASTERIDE 5 MG/1
5 TABLET, FILM COATED ORAL DAILY
Qty: 30 TABLET | Refills: 5 | Status: SHIPPED | OUTPATIENT
Start: 2024-07-31

## 2024-07-31 RX ORDER — OMEPRAZOLE 40 MG/1
40 CAPSULE, DELAYED RELEASE ORAL
Qty: 90 CAPSULE | Refills: 1 | Status: SHIPPED | OUTPATIENT
Start: 2024-07-31

## 2024-07-31 RX ORDER — LISINOPRIL AND HYDROCHLOROTHIAZIDE 20; 12.5 MG/1; MG/1
TABLET ORAL
Qty: 90 TABLET | Refills: 1 | Status: SHIPPED | OUTPATIENT
Start: 2024-07-31

## 2024-07-31 SDOH — ECONOMIC STABILITY: FOOD INSECURITY: WITHIN THE PAST 12 MONTHS, THE FOOD YOU BOUGHT JUST DIDN'T LAST AND YOU DIDN'T HAVE MONEY TO GET MORE.: NEVER TRUE

## 2024-07-31 SDOH — ECONOMIC STABILITY: FOOD INSECURITY: WITHIN THE PAST 12 MONTHS, YOU WORRIED THAT YOUR FOOD WOULD RUN OUT BEFORE YOU GOT MONEY TO BUY MORE.: NEVER TRUE

## 2024-07-31 SDOH — ECONOMIC STABILITY: INCOME INSECURITY: HOW HARD IS IT FOR YOU TO PAY FOR THE VERY BASICS LIKE FOOD, HOUSING, MEDICAL CARE, AND HEATING?: NOT HARD AT ALL

## 2024-07-31 ASSESSMENT — PATIENT HEALTH QUESTIONNAIRE - PHQ9
2. FEELING DOWN, DEPRESSED OR HOPELESS: NOT AT ALL
SUM OF ALL RESPONSES TO PHQ QUESTIONS 1-9: 0
SUM OF ALL RESPONSES TO PHQ9 QUESTIONS 1 & 2: 0
SUM OF ALL RESPONSES TO PHQ QUESTIONS 1-9: 0
1. LITTLE INTEREST OR PLEASURE IN DOING THINGS: NOT AT ALL

## 2024-07-31 ASSESSMENT — LIFESTYLE VARIABLES
HOW MANY STANDARD DRINKS CONTAINING ALCOHOL DO YOU HAVE ON A TYPICAL DAY: PATIENT DOES NOT DRINK
HOW OFTEN DO YOU HAVE A DRINK CONTAINING ALCOHOL: NEVER

## 2024-07-31 NOTE — PROGRESS NOTES
hearing that hasn't been managed with hearing aids?: (!) Yes    Interventions:  Patient declines any further evaluation or treatment                 Objective   Vitals:    07/31/24 0834   BP: 124/62   Pulse: 75   Temp: 97.7 °F (36.5 °C)   SpO2: 98%   Weight: 83.9 kg (185 lb)   Height: 1.803 m (5' 11\")      Body mass index is 25.8 kg/m².        Physical Exam  Vitals and nursing note reviewed.   Constitutional:       General: He is not in acute distress.     Appearance: Normal appearance. He is well-developed and normal weight. He is not ill-appearing.   HENT:      Head: Normocephalic and atraumatic.      Right Ear: Hearing and external ear normal.      Left Ear: Hearing and external ear normal.      Nose: Nose normal.      Mouth/Throat:      Mouth: Mucous membranes are moist.   Eyes:      General: Lids are normal. No scleral icterus.     Extraocular Movements: Extraocular movements intact.      Conjunctiva/sclera: Conjunctivae normal.   Neck:      Thyroid: No thyromegaly.      Vascular: No carotid bruit.   Cardiovascular:      Rate and Rhythm: Normal rate and regular rhythm.      Heart sounds: Normal heart sounds. No murmur heard.  Pulmonary:      Effort: Pulmonary effort is normal. No respiratory distress.      Breath sounds: Normal breath sounds. No wheezing.   Musculoskeletal:         General: No tenderness or deformity. Normal range of motion.      Cervical back: Normal range of motion and neck supple.      Right lower leg: No edema.      Left lower leg: No edema.   Lymphadenopathy:      Cervical: No cervical adenopathy.   Skin:     General: Skin is warm and dry.      Findings: No rash.   Neurological:      General: No focal deficit present.      Mental Status: He is alert and oriented to person, place, and time.      Gait: Gait normal.   Psychiatric:         Mood and Affect: Mood and affect normal.         Speech: Speech normal.         Behavior: Behavior normal.         Thought Content: Thought content normal.

## 2024-07-31 NOTE — PATIENT INSTRUCTIONS
Personalized Preventive Plan for Chetan Mccullough - 7/31/2024  Medicare offers a range of preventive health benefits. Some of the tests and screenings are paid in full while other may be subject to a deductible, co-insurance, and/or copay.    Some of these benefits include a comprehensive review of your medical history including lifestyle, illnesses that may run in your family, and various assessments and screenings as appropriate.    After reviewing your medical record and screening and assessments performed today your provider may have ordered immunizations, labs, imaging, and/or referrals for you.  A list of these orders (if applicable) as well as your Preventive Care list are included within your After Visit Summary for your review.    Other Preventive Recommendations:    A preventive eye exam performed by an eye specialist is recommended every 1-2 years to screen for glaucoma; cataracts, macular degeneration, and other eye disorders.  A preventive dental visit is recommended every 6 months.  Try to get at least 150 minutes of exercise per week or 10,000 steps per day on a pedometer .  Order or download the FREE \"Exercise & Physical Activity: Your Everyday Guide\" from [employer] [place] [employer]. Call 1-449.124.1457 or search [employer] [place] [employer] online.  You need 8749-8429 mg of calcium and 1010-7618 IU of vitamin D per day. It is possible to meet your calcium requirement with diet alone, but a vitamin D supplement is usually necessary to meet this goal.  When exposed to the sun, use a sunscreen that protects against both UVA and UVB radiation with an SPF of 30 or greater. Reapply every 2 to 3 hours or after sweating, drying off with a towel, or swimming.  Always wear a seat belt when traveling in a car. Always wear a helmet when riding a bicycle or motorcycle.

## 2024-08-05 DIAGNOSIS — R73.01 IMPAIRED FASTING GLUCOSE: ICD-10-CM

## 2024-08-05 DIAGNOSIS — R97.20 ELEVATED PSA: ICD-10-CM

## 2024-08-05 DIAGNOSIS — I10 BENIGN ESSENTIAL HYPERTENSION: ICD-10-CM

## 2024-08-05 DIAGNOSIS — E55.9 VITAMIN D INSUFFICIENCY: ICD-10-CM

## 2024-08-05 DIAGNOSIS — E78.2 MIXED HYPERLIPIDEMIA: ICD-10-CM

## 2024-08-05 LAB
BASOPHILS ABSOLUTE: 0.03 K/UL (ref 0–0.2)
BASOPHILS RELATIVE PERCENT: 0 % (ref 0–2)
BILIRUBIN, URINE: NEGATIVE
COLOR, UA: YELLOW
EOSINOPHILS ABSOLUTE: 0.11 K/UL (ref 0.05–0.5)
EOSINOPHILS RELATIVE PERCENT: 2 % (ref 0–6)
GLUCOSE URINE: NEGATIVE MG/DL
HBA1C MFR BLD: 5.6 % (ref 4–5.6)
HCT VFR BLD CALC: 45.5 % (ref 37–54)
HEMOGLOBIN: 14.8 G/DL (ref 12.5–16.5)
IMMATURE GRANULOCYTES %: 0 % (ref 0–5)
IMMATURE GRANULOCYTES ABSOLUTE: 0.03 K/UL (ref 0–0.58)
KETONES, URINE: NEGATIVE MG/DL
LEUKOCYTE ESTERASE, URINE: NEGATIVE
LYMPHOCYTES ABSOLUTE: 0.57 K/UL (ref 1.5–4)
LYMPHOCYTES RELATIVE PERCENT: 8 % (ref 20–42)
MCH RBC QN AUTO: 31.4 PG (ref 26–35)
MCHC RBC AUTO-ENTMCNC: 32.5 G/DL (ref 32–34.5)
MCV RBC AUTO: 96.6 FL (ref 80–99.9)
MONOCYTES ABSOLUTE: 0.57 K/UL (ref 0.1–0.95)
MONOCYTES RELATIVE PERCENT: 8 % (ref 2–12)
NEUTROPHILS ABSOLUTE: 5.75 K/UL (ref 1.8–7.3)
NEUTROPHILS RELATIVE PERCENT: 81 % (ref 43–80)
NITRITE, URINE: NEGATIVE
PDW BLD-RTO: 13 % (ref 11.5–15)
PH, URINE: 7 (ref 5–9)
PLATELET # BLD: 128 K/UL (ref 130–450)
PMV BLD AUTO: 10.6 FL (ref 7–12)
PROTEIN UA: NEGATIVE MG/DL
RBC # BLD: 4.71 M/UL (ref 3.8–5.8)
RBC # BLD: ABNORMAL 10*6/UL
RBC # BLD: ABNORMAL 10*6/UL
SPECIFIC GRAVITY UA: 1.01 (ref 1–1.03)
TURBIDITY: CLEAR
URINE HGB: NEGATIVE
UROBILINOGEN, URINE: 0.2 EU/DL (ref 0–1)
WBC # BLD: 7.1 K/UL (ref 4.5–11.5)

## 2024-08-06 LAB
ALBUMIN: 4.5 G/DL (ref 3.5–5.2)
ALP BLD-CCNC: 64 U/L (ref 40–129)
ALT SERPL-CCNC: 25 U/L (ref 0–40)
ANION GAP SERPL CALCULATED.3IONS-SCNC: 14 MMOL/L (ref 7–16)
AST SERPL-CCNC: 23 U/L (ref 0–39)
BILIRUB SERPL-MCNC: 0.7 MG/DL (ref 0–1.2)
BUN BLDV-MCNC: 18 MG/DL (ref 6–23)
CALCIUM SERPL-MCNC: 9.3 MG/DL (ref 8.6–10.2)
CHLORIDE BLD-SCNC: 103 MMOL/L (ref 98–107)
CHOLESTEROL, TOTAL: 145 MG/DL
CO2: 21 MMOL/L (ref 22–29)
CREAT SERPL-MCNC: 1.1 MG/DL (ref 0.7–1.2)
GFR, ESTIMATED: 71 ML/MIN/1.73M2
GLUCOSE BLD-MCNC: 138 MG/DL (ref 74–99)
HDLC SERPL-MCNC: 33 MG/DL
LDL CHOLESTEROL: 90 MG/DL
POTASSIUM SERPL-SCNC: 4.3 MMOL/L (ref 3.5–5)
PROSTATE SPECIFIC ANTIGEN: 1.06 NG/ML (ref 0–4)
SODIUM BLD-SCNC: 138 MMOL/L (ref 132–146)
TOTAL PROTEIN: 7.2 G/DL (ref 6.4–8.3)
TRIGL SERPL-MCNC: 112 MG/DL
TSH SERPL DL<=0.05 MIU/L-ACNC: 0.59 UIU/ML (ref 0.27–4.2)
URIC ACID: 7.3 MG/DL (ref 3.4–7)
VITAMIN D 25-HYDROXY: 34.5 NG/ML (ref 30–100)
VLDLC SERPL CALC-MCNC: 22 MG/DL

## 2025-01-23 DIAGNOSIS — I10 BENIGN ESSENTIAL HYPERTENSION: ICD-10-CM

## 2025-01-24 RX ORDER — LISINOPRIL AND HYDROCHLOROTHIAZIDE 12.5; 2 MG/1; MG/1
TABLET ORAL
Qty: 90 TABLET | Refills: 1 | Status: SHIPPED | OUTPATIENT
Start: 2025-01-24

## 2025-01-29 ENCOUNTER — TELEPHONE (OUTPATIENT)
Dept: PRIMARY CARE CLINIC | Age: 75
End: 2025-01-29

## 2025-01-29 DIAGNOSIS — E55.9 VITAMIN D INSUFFICIENCY: ICD-10-CM

## 2025-01-29 DIAGNOSIS — R97.20 ELEVATED PSA: ICD-10-CM

## 2025-01-29 DIAGNOSIS — E78.2 MIXED HYPERLIPIDEMIA: ICD-10-CM

## 2025-01-29 DIAGNOSIS — R73.01 IMPAIRED FASTING GLUCOSE: ICD-10-CM

## 2025-01-29 DIAGNOSIS — I10 BENIGN ESSENTIAL HYPERTENSION: Primary | ICD-10-CM

## 2025-01-29 NOTE — TELEPHONE ENCOUNTER
The pt is calling to see if you can order him some labs so he can go and get them drawn before his appointment on 01/31

## 2025-01-31 ENCOUNTER — OFFICE VISIT (OUTPATIENT)
Dept: FAMILY MEDICINE CLINIC | Age: 75
End: 2025-01-31
Payer: MEDICARE

## 2025-01-31 VITALS
HEIGHT: 71 IN | OXYGEN SATURATION: 96 % | SYSTOLIC BLOOD PRESSURE: 122 MMHG | WEIGHT: 191 LBS | RESPIRATION RATE: 16 BRPM | TEMPERATURE: 98.2 F | BODY MASS INDEX: 26.74 KG/M2 | DIASTOLIC BLOOD PRESSURE: 76 MMHG | HEART RATE: 77 BPM

## 2025-01-31 DIAGNOSIS — E55.9 VITAMIN D INSUFFICIENCY: ICD-10-CM

## 2025-01-31 DIAGNOSIS — R73.01 IMPAIRED FASTING GLUCOSE: Primary | ICD-10-CM

## 2025-01-31 DIAGNOSIS — R97.20 ELEVATED PSA: ICD-10-CM

## 2025-01-31 DIAGNOSIS — I10 BENIGN ESSENTIAL HYPERTENSION: ICD-10-CM

## 2025-01-31 DIAGNOSIS — K21.9 GERD WITHOUT ESOPHAGITIS: ICD-10-CM

## 2025-01-31 DIAGNOSIS — R73.01 IMPAIRED FASTING GLUCOSE: ICD-10-CM

## 2025-01-31 DIAGNOSIS — E78.2 MIXED HYPERLIPIDEMIA: ICD-10-CM

## 2025-01-31 DIAGNOSIS — K86.1 CHRONIC CALCIFIC PANCREATITIS (HCC): ICD-10-CM

## 2025-01-31 LAB
ALBUMIN: 4.6 G/DL (ref 3.5–5.2)
ALP BLD-CCNC: 74 U/L (ref 40–129)
ALT SERPL-CCNC: 32 U/L (ref 0–40)
ANION GAP SERPL CALCULATED.3IONS-SCNC: 14 MMOL/L (ref 7–16)
AST SERPL-CCNC: 27 U/L (ref 0–39)
BASOPHILS ABSOLUTE: 0.03 K/UL (ref 0–0.2)
BASOPHILS RELATIVE PERCENT: 1 % (ref 0–2)
BILIRUB SERPL-MCNC: 0.9 MG/DL (ref 0–1.2)
BILIRUBIN, URINE: NEGATIVE
BUN BLDV-MCNC: 16 MG/DL (ref 6–23)
CALCIUM SERPL-MCNC: 10.1 MG/DL (ref 8.6–10.2)
CHLORIDE BLD-SCNC: 97 MMOL/L (ref 98–107)
CHOLESTEROL, TOTAL: 160 MG/DL
CO2: 25 MMOL/L (ref 22–29)
COLOR, UA: YELLOW
COMMENT: NORMAL
CREAT SERPL-MCNC: 1.2 MG/DL (ref 0.7–1.2)
EOSINOPHILS ABSOLUTE: 0.14 K/UL (ref 0.05–0.5)
EOSINOPHILS RELATIVE PERCENT: 3 % (ref 0–6)
GFR, ESTIMATED: 65 ML/MIN/1.73M2
GLUCOSE BLD-MCNC: 133 MG/DL (ref 74–99)
GLUCOSE URINE: NEGATIVE MG/DL
HBA1C MFR BLD: 6 % (ref 4–5.6)
HCT VFR BLD CALC: 47.6 % (ref 37–54)
HDLC SERPL-MCNC: 32 MG/DL
HEMOGLOBIN: 16.1 G/DL (ref 12.5–16.5)
IMMATURE GRANULOCYTES %: 0 % (ref 0–5)
IMMATURE GRANULOCYTES ABSOLUTE: <0.03 K/UL (ref 0–0.58)
KETONES, URINE: NEGATIVE MG/DL
LDL CHOLESTEROL: 99 MG/DL
LEUKOCYTE ESTERASE, URINE: NEGATIVE
LYMPHOCYTES ABSOLUTE: 0.66 K/UL (ref 1.5–4)
LYMPHOCYTES RELATIVE PERCENT: 13 % (ref 20–42)
MCH RBC QN AUTO: 32.5 PG (ref 26–35)
MCHC RBC AUTO-ENTMCNC: 33.8 G/DL (ref 32–34.5)
MCV RBC AUTO: 96 FL (ref 80–99.9)
MONOCYTES ABSOLUTE: 0.44 K/UL (ref 0.1–0.95)
MONOCYTES RELATIVE PERCENT: 9 % (ref 2–12)
NEUTROPHILS ABSOLUTE: 3.91 K/UL (ref 1.8–7.3)
NEUTROPHILS RELATIVE PERCENT: 76 % (ref 43–80)
NITRITE, URINE: NEGATIVE
PDW BLD-RTO: 12.9 % (ref 11.5–15)
PH, URINE: 7 (ref 5–8)
PLATELET # BLD: 145 K/UL (ref 130–450)
PMV BLD AUTO: 11.3 FL (ref 7–12)
POTASSIUM SERPL-SCNC: 4.3 MMOL/L (ref 3.5–5)
PROSTATE SPECIFIC ANTIGEN: 1.36 NG/ML (ref 0–4)
PROTEIN UA: NEGATIVE MG/DL
RBC # BLD: 4.96 M/UL (ref 3.8–5.8)
SODIUM BLD-SCNC: 136 MMOL/L (ref 132–146)
SPECIFIC GRAVITY UA: 1.01 (ref 1–1.03)
TOTAL PROTEIN: 7.5 G/DL (ref 6.4–8.3)
TRIGL SERPL-MCNC: 145 MG/DL
TSH SERPL DL<=0.05 MIU/L-ACNC: 0.45 UIU/ML (ref 0.27–4.2)
TURBIDITY: CLEAR
URIC ACID: 8.4 MG/DL (ref 3.4–7)
URINE HGB: NEGATIVE
UROBILINOGEN, URINE: 1 EU/DL (ref 0–1)
VITAMIN D 25-HYDROXY: 36.9 NG/ML (ref 30–100)
VLDLC SERPL CALC-MCNC: 29 MG/DL
WBC # BLD: 5.2 K/UL (ref 4.5–11.5)

## 2025-01-31 PROCEDURE — 1159F MED LIST DOCD IN RCRD: CPT | Performed by: FAMILY MEDICINE

## 2025-01-31 PROCEDURE — 3074F SYST BP LT 130 MM HG: CPT | Performed by: FAMILY MEDICINE

## 2025-01-31 PROCEDURE — 1160F RVW MEDS BY RX/DR IN RCRD: CPT | Performed by: FAMILY MEDICINE

## 2025-01-31 PROCEDURE — 99214 OFFICE O/P EST MOD 30 MIN: CPT | Performed by: FAMILY MEDICINE

## 2025-01-31 PROCEDURE — 3078F DIAST BP <80 MM HG: CPT | Performed by: FAMILY MEDICINE

## 2025-01-31 PROCEDURE — 1123F ACP DISCUSS/DSCN MKR DOCD: CPT | Performed by: FAMILY MEDICINE

## 2025-01-31 PROCEDURE — G2211 COMPLEX E/M VISIT ADD ON: HCPCS | Performed by: FAMILY MEDICINE

## 2025-01-31 RX ORDER — OMEPRAZOLE 40 MG/1
40 CAPSULE, DELAYED RELEASE ORAL
Qty: 90 CAPSULE | Refills: 3 | Status: SHIPPED | OUTPATIENT
Start: 2025-01-31

## 2025-01-31 RX ORDER — AMLODIPINE BESYLATE 5 MG/1
5 TABLET ORAL NIGHTLY
Qty: 90 TABLET | Refills: 3 | Status: SHIPPED | OUTPATIENT
Start: 2025-01-31

## 2025-01-31 RX ORDER — LISINOPRIL AND HYDROCHLOROTHIAZIDE 12.5; 2 MG/1; MG/1
TABLET ORAL
Qty: 90 TABLET | Refills: 3 | Status: SHIPPED | OUTPATIENT
Start: 2025-01-31

## 2025-01-31 SDOH — ECONOMIC STABILITY: FOOD INSECURITY: WITHIN THE PAST 12 MONTHS, YOU WORRIED THAT YOUR FOOD WOULD RUN OUT BEFORE YOU GOT MONEY TO BUY MORE.: NEVER TRUE

## 2025-01-31 SDOH — ECONOMIC STABILITY: FOOD INSECURITY: WITHIN THE PAST 12 MONTHS, THE FOOD YOU BOUGHT JUST DIDN'T LAST AND YOU DIDN'T HAVE MONEY TO GET MORE.: NEVER TRUE

## 2025-01-31 ASSESSMENT — PATIENT HEALTH QUESTIONNAIRE - PHQ9
SUM OF ALL RESPONSES TO PHQ9 QUESTIONS 1 & 2: 0
SUM OF ALL RESPONSES TO PHQ QUESTIONS 1-9: 0
2. FEELING DOWN, DEPRESSED OR HOPELESS: NOT AT ALL
1. LITTLE INTEREST OR PLEASURE IN DOING THINGS: NOT AT ALL

## 2025-01-31 ASSESSMENT — ENCOUNTER SYMPTOMS
SHORTNESS OF BREATH: 0
ABDOMINAL PAIN: 0
VOMITING: 0
BACK PAIN: 0
NAUSEA: 0
COUGH: 0
DIARRHEA: 0
CONSTIPATION: 0
WHEEZING: 0

## 2025-01-31 NOTE — ASSESSMENT & PLAN NOTE
Chronic, at goal (stable), continue current treatment plan    Orders:    omeprazole (PRILOSEC) 40 MG delayed release capsule; Take 1 capsule by mouth every morning (before breakfast)

## 2025-01-31 NOTE — PROGRESS NOTES
25  Chetan Mccullough : 1950 Sex: male  Age: 74 y.o.    Chief Complaint   Patient presents with    Hypertension     HPI:  74 y.o. male patient presents today for 6 month(s) follow up of chronic medical conditions, medication refills and FBW.  Patient's chart, medical, surgical and medication history all reviewed.    Hypertension   The patient presents today for follow up of HTN.  The problem is well controlled. Risk factors for coronary artery disease include Age > 30, male, HTN and elevated cholesterol. Current treatments include amlodipine (Norvasc), hydrochlorothiazide (HCTZ) and lisinopril (Prinivil). The patient is compliant all of the time.  Lifestyle changes the patient has made include dietary improvement(s) and weight management.  Today the patient is complaining of none.      Hyperlipidemia  The 10-year ASCVD risk score (Jose PANDA, et al., 2019) is: 25.5%    Values used to calculate the score:      Age: 74 years      Sex: Male      Is Non- : No      Diabetic: No      Tobacco smoker: No      Systolic Blood Pressure: 122 mmHg      Is BP treated: Yes      HDL Cholesterol: 33 mg/dL      Total Cholesterol: 145 mg/dL    ROS:  Review of Systems   Constitutional:  Negative for chills, fatigue and fever.   Respiratory:  Negative for cough, shortness of breath and wheezing.    Cardiovascular:  Negative for chest pain and palpitations.   Gastrointestinal:  Negative for abdominal pain, constipation, diarrhea, nausea and vomiting.   Musculoskeletal:  Negative for arthralgias and back pain.   Skin:  Negative for rash.   Neurological:  Negative for dizziness and headaches.   Psychiatric/Behavioral:  Negative for dysphoric mood. The patient is not nervous/anxious.    All other systems reviewed and are negative.       Current Outpatient Medications on File Prior to Visit   Medication Sig Dispense Refill    lipase-protease-amylase (CREON) [id number] units CPEP delayed release capsule

## 2025-01-31 NOTE — ASSESSMENT & PLAN NOTE
Chronic, at goal (stable), continue current treatment plan    Orders:    amLODIPine (NORVASC) 5 MG tablet; Take 1 tablet by mouth nightly    lisinopril-hydroCHLOROthiazide (PRINZIDE;ZESTORETIC) 20-12.5 MG per tablet; TAKE ONE TABLET BY MOUTH EVERY DAY IN THE MORNING    CBC with Auto Differential; Future    Comprehensive Metabolic Panel; Future    Lipid Panel; Future    TSH; Future    Urinalysis; Future    Uric Acid; Future

## 2025-02-04 ENCOUNTER — NURSE ONLY (OUTPATIENT)
Dept: FAMILY MEDICINE CLINIC | Age: 75
End: 2025-02-04

## 2025-02-04 DIAGNOSIS — Z23 NEED FOR INFLUENZA VACCINATION: Primary | ICD-10-CM

## 2025-02-25 DIAGNOSIS — I10 BENIGN ESSENTIAL HYPERTENSION: ICD-10-CM

## 2025-02-25 RX ORDER — AMLODIPINE BESYLATE 5 MG/1
5 TABLET ORAL NIGHTLY
Qty: 90 TABLET | Refills: 3 | Status: SHIPPED | OUTPATIENT
Start: 2025-02-25

## 2025-02-25 RX ORDER — AMLODIPINE BESYLATE 5 MG/1
5 TABLET ORAL NIGHTLY
Qty: 90 TABLET | Refills: 1 | OUTPATIENT
Start: 2025-02-25

## 2025-02-25 NOTE — TELEPHONE ENCOUNTER
"[facility]: [facility]  PHYSICAL EXAM      Kelvin Engle is a 59 y.o. male that is presenting today for Annual Exam (CPE).    Assessment/Plan   Diagnoses and all orders for this visit:  Annual physical exam  Encounter for routine laboratory testing  Encounter for prostate cancer screening  Encounter for colorectal cancer screening  Encounter for immunization  Type 2 diabetes mellitus with other specified complication, with long-term current use of insulin (CMS/HCC)    Subjective   - The patient otherwise feels well and denies any acute symptoms or concerns at this time.  - The patient denies any changes or progression of their chronic medical problems.  - The patient denies any problems or concerns with their medications.      Review of Systems   Objective   Vitals:    10/20/23 0922   BP: 138/80   Pulse: 73   Temp: 36.7 °C (98 °F)   SpO2: 98%     Body mass index is 31.75 kg/m².  Physical Exam  Diagnostic Results   Lab Results   Component Value Date    GLUCOSE 214 (H) 10/22/2022    CALCIUM 9.4 10/22/2022     10/22/2022    K 4.6 10/22/2022    CO2 28 10/22/2022    CL 99 10/22/2022    BUN 15 10/22/2022    CREATININE 0.9 10/22/2022     Lab Results   Component Value Date    ALT 17 10/22/2022    AST 15 10/22/2022    ALKPHOS 80 10/22/2022    BILITOT 0.7 10/22/2022     Lab Results   Component Value Date    WBC 6.8 10/22/2022    HGB 15.7 10/22/2022    HCT 45.8 10/22/2022    MCV 87.7 10/22/2022     10/22/2022     Lab Results   Component Value Date    CHOL 215 (H) 10/22/2022    CHOL 187 04/14/2021    CHOL 172 02/22/2020     Lab Results   Component Value Date    HDL 48 10/22/2022    HDL 39.3 (A) 04/14/2021    HDL 40.6 02/22/2020     Lab Results   Component Value Date    LDLCALC 145 (H) 10/22/2022     Lab Results   Component Value Date    TRIG 109 10/22/2022    TRIG 125 04/14/2021    TRIG 113 02/22/2020     No components found for: \"CHOLHDL\"  Lab Results   Component Value Date    HGBA1C 6.9 " Name of Medication(s) Requested:  Requested Prescriptions      No prescriptions requested or ordered in this encounter       Medication is on current medication list Yes    Dosage and directions were verified? Yes    Quantity verified: 90 day supply     Pharmacy Verified?  Yes    Last Appointment:  1/31/2025    Future appts:  Future Appointments   Date Time Provider Department Center   9/5/2025  8:00 AM Debo Abarca DO [facility]        (If no appt send self scheduling link. .REFILLAPPT)  Scheduling request sent?     [] Yes  [] No    Does patient need updated?  [] Yes  [] No   04/14/2021     Other labs not included in the list above were reviewed either before or during this encounter.    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    Pain in left foot 05/11/2018    Pain of left heel    Pain in throat 08/15/2015    Throat discomfort    Personal history of diseases of the skin and subcutaneous tissue 12/19/2018    History of drug dermatitis    Personal history of other diseases of the nervous system and sense organs     History of sleep apnea    Personal history of other diseases of the respiratory system 08/06/2014    History of upper respiratory infection    Personal history of other specified conditions 09/23/2016    History of weight gain    Personal history of other specified conditions 09/23/2016    History of fatigue    Personal history of other specified conditions 12/07/2014    History of dysuria    Personal history of other specified conditions 10/28/2013    History of shortness of breath    Personal history of other specified conditions 05/28/2016    History of dizziness    Personal history of other specified conditions     History of snoring    Personal history of urinary (tract) infections 12/19/2013    History of urinary tract infection    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    Plantar fasciitis, left    Procedure and treatment not carried out because of patient's decision for unspecified reasons 02/08/2018    Statin declined    Procedure and treatment not carried out because of  patient's decision for unspecified reasons 03/19/2021    Colonoscopy refused    Spermatocele of epididymis, unspecified 07/24/2016    Spermatocele    Unspecified convulsions (CMS/HCC)     Generalized convulsive seizure     Past Surgical History:   Procedure Laterality Date    TONSILLECTOMY  02/08/2018    Tonsillectomy     Family History   Problem Relation Name Age of Onset    Diabetes Father      Mental illness Father      Bipolar disorder Father      Mental illness Brother       Social History     Socioeconomic History    Marital status:      Spouse name: Not on file    Number of children: Not on file    Years of education: Not on file    Highest education level: Not on file   Occupational History    Not on file   Tobacco Use    Smoking status: Never    Smokeless tobacco: Never   Substance and Sexual Activity    Alcohol use: Yes     Comment: socially    Drug use: Never    Sexual activity: Not on file   Other Topics Concern    Not on file   Social History Narrative    Not on file     Social Determinants of Health     Financial Resource Strain: Not on file   Food Insecurity: Not on file   Transportation Needs: Not on file   Physical Activity: Not on file   Stress: Not on file   Social Connections: Not on file   Intimate Partner Violence: Not on file   Housing Stability: Not on file     Allergies   Allergen Reactions    Carbamazepine Hives and Unknown    Dapagliflozin Other and Dizziness     Fatigue     Current Outpatient Medications on File Prior to Visit   Medication Sig Dispense Refill    cholecalciferol (Vitamin D-3) 50 MCG (2000 UT) tablet Take 1 tablet (2,000 Units) by mouth once daily.      clotrimazole-betamethasone (Lotrisone) cream Apply 1 Application topically twice a day. APPLY AND RUB IN A THIN FILM TO AFFECTED AREAS TWICE DAILY.(AM AND PM)      insulin aspart (NovoLOG) 100 unit/mL (3 mL) pen Inject 2-6 Units under the skin 3 times a day.      insulin degludec (Tresiba U-100 Insulin) 100 unit/mL  injection Inject 34 Units under the skin once daily.      metFORMIN (Glucophage) 1,000 mg tablet Take 1 tablet (1,000 mg) by mouth 2 times a day with meals.      VITAMIN B COMPLEX ORAL Take by mouth.      [DISCONTINUED] insulin degludec (Tresiba FlexTouch U-200) 200 unit/mL (3 mL) injection Inject 34 Units under the skin once daily in the morning.       No current facility-administered medications on file prior to visit.     Immunization History   Administered Date(s) Administered    Hepatitis B vaccine, adult (RECOMBIVAX, ENGERIX) 07/09/2015, 09/23/2015    Hepatitis B vaccine, pediatric/adolescent (RECOMBIVAX, ENGERIX) 01/28/2016    Moderna SARS-CoV-2 Vaccination 03/10/2021    Pfizer Purple Cap SARS-CoV-2 12/02/2021    Tdap vaccine, age 7 year and older (BOOSTRIX) 07/14/2021     Patient's medical history was reviewed and updated either before or during this encounter.    Kelvin Naidu MD

## 2025-03-03 ENCOUNTER — TELEPHONE (OUTPATIENT)
Dept: HEMATOLOGY | Age: 75
End: 2025-03-03

## 2025-03-03 NOTE — TELEPHONE ENCOUNTER
Sylvester is scheduled at [facility] 3/10/25. Arrive at 1130am, scan 12noon, NPO 4-6 hours. Labs to be completed prior. Called and LVM for Sylvester.

## 2025-03-11 NOTE — TELEPHONE ENCOUNTER
Mri r/s'd by Patient. Spoke to to Sylvester who requested to schedule his f/u appt mid April due to trip to [place].

## 2025-03-14 DIAGNOSIS — K86.3 PANCREATIC PSEUDOCYST: ICD-10-CM

## 2025-03-14 DIAGNOSIS — K86.1 CHRONIC PANCREATITIS, UNSPECIFIED PANCREATITIS TYPE (HCC): Primary | ICD-10-CM

## 2025-03-18 DIAGNOSIS — K86.1 CHRONIC PANCREATITIS, UNSPECIFIED PANCREATITIS TYPE (HCC): ICD-10-CM

## 2025-03-18 DIAGNOSIS — K86.3 PANCREATIC PSEUDOCYST: ICD-10-CM

## 2025-03-19 LAB
BUN BLDV-MCNC: 17 MG/DL (ref 6–23)
CREAT SERPL-MCNC: 1.1 MG/DL (ref 0.7–1.2)
GFR, ESTIMATED: 69 ML/MIN/1.73M2

## 2025-03-26 ENCOUNTER — HOSPITAL ENCOUNTER (OUTPATIENT)
Dept: MRI IMAGING | Age: 75
Discharge: HOME | End: 2025-03-28
Attending: TRANSPLANT SURGERY
Payer: MEDICARE

## 2025-03-26 DIAGNOSIS — K86.89 DILATION OF PANCREATIC DUCT: ICD-10-CM

## 2025-03-26 DIAGNOSIS — K86.1 CHRONIC CALCIFIC PANCREATITIS (HCC): ICD-10-CM

## 2025-03-26 PROCEDURE — A9577 INJ MULTIHANCE: HCPCS | Performed by: RADIOLOGY

## 2025-03-26 PROCEDURE — 6360000004 HC RX CONTRAST MEDICATION: Performed by: RADIOLOGY

## 2025-03-26 PROCEDURE — 74183 MRI ABD W/O CNTR FLWD CNTR: CPT

## 2025-03-26 RX ORDER — SODIUM CHLORIDE 0.9 % (FLUSH) 0.9 %
5-40 SYRINGE (ML) INJECTION 2 TIMES DAILY
Status: DISCONTINUED | OUTPATIENT
Start: 2025-03-26 | End: 2025-03-29 | Stop reason: HOSPADM

## 2025-03-26 RX ADMIN — GADOBENATE DIMEGLUMINE 17 ML: 529 INJECTION, SOLUTION INTRAVENOUS at 09:51

## 2025-04-16 ENCOUNTER — TELEPHONE (OUTPATIENT)
Dept: HEMATOLOGY | Age: 75
End: 2025-04-16

## 2025-04-16 NOTE — TELEPHONE ENCOUNTER
The patient called our office today. He stated that he received a letter from his insurance company that his MRI was not covered. Upon looking into the patients chart, the expiration for his MRI was on 03/20/2025, the patient has his MRI on 03/26/25 due to a reschedule. I reached out to [employer] online and spoke with representative Marcelino via online chat.  The end date to the patient prior auth was updated to 03/26/2025. I scanned the new approval into the patients chart along with the online chat that I had with representative Marcelino. I also called the patient to let him know that the date was changed and approved. I let the patient know that he should reach out to our billing department to let them know also. A copy of the the edited approval along with the online chat will also be hand delivered to the patient at his office appt on 04/22/2025  Electronically signed by Quita Keyes MA on 4/16/2025 at 1:10 PM

## 2025-04-22 ENCOUNTER — OFFICE VISIT (OUTPATIENT)
Dept: SURGERY | Age: 75
End: 2025-04-22
Payer: MEDICARE

## 2025-04-22 VITALS
HEIGHT: 71 IN | SYSTOLIC BLOOD PRESSURE: 126 MMHG | TEMPERATURE: 98.2 F | RESPIRATION RATE: 16 BRPM | DIASTOLIC BLOOD PRESSURE: 87 MMHG | BODY MASS INDEX: 27.44 KG/M2 | OXYGEN SATURATION: 96 % | HEART RATE: 77 BPM | WEIGHT: 196 LBS

## 2025-04-22 DIAGNOSIS — K86.1 CHRONIC CALCIFIC PANCREATITIS (HCC): Primary | ICD-10-CM

## 2025-04-22 PROCEDURE — 1159F MED LIST DOCD IN RCRD: CPT | Performed by: TRANSPLANT SURGERY

## 2025-04-22 PROCEDURE — 3074F SYST BP LT 130 MM HG: CPT | Performed by: TRANSPLANT SURGERY

## 2025-04-22 PROCEDURE — 99212 OFFICE O/P EST SF 10 MIN: CPT | Performed by: TRANSPLANT SURGERY

## 2025-04-22 PROCEDURE — 1123F ACP DISCUSS/DSCN MKR DOCD: CPT | Performed by: TRANSPLANT SURGERY

## 2025-04-22 PROCEDURE — 3079F DIAST BP 80-89 MM HG: CPT | Performed by: TRANSPLANT SURGERY

## 2025-04-22 NOTE — PROGRESS NOTES
Hepatobiliary and Pancreatic Surgery Attending History and Physical    Patient's Name/Date of Birth: Chetan Mccullough /1950 (75 y.o.)    Date: April 22, 2025     CC: Chronic calcific pancreatitis with WON in pancreatic head and pancreatic duct dilation    HPI:  Patient is a very pleasant 75year old male who had an EUS showed 2.8 cm cystic mass at the pancreatic head with persistent pancreatic and peripancreatic inflammation consistent with pancreatitis and pancreatic pseudocyst in 2021.  The pancreatic duct was mildly dilated  throughout the pancreas.  He had a repeat triphasic CT scan which showed pancreatic head necrosis 9/21. He underwent a laparoscopic robotic cholecystectomy which showed chronic cholecystitis with cholelithiasis.     His last bout of pancreatitis was in 4/23. He is sticking to a low-fat diet of 50 g of fat per day.  He has not had any recurrent bouts of pancreatitis since then.  He is taking creon.  He eats smaller portions.        Physical Exam:  /87   Pulse 77   Temp 98.2 °F (36.8 °C)   Resp 16   Ht 1.803 m (5' 11\")   Wt 88.9 kg (196 lb)   SpO2 96%   BMI 27.34 kg/m²       PSYCH: mood and affect normal, alert and oriented x 3: No apparent distress, comfortable  EYES: Sclera white, pupils equal round and reactive to light  ENMT:  Hearing normal, trachea midline, ears externally intact  LYMPH: no obvious lympadenopathy in neck.   RESP: Respiratory effort was normal with no retractions or use of accessory muscles.  CV:  No pedal edema  GI/ Abdomen: Soft, nondistended, nontender, no guarding, no peritoneal signs  MSK: no clubbing/ no cyanosis/ gaitnormal    Assessment & Plan   Assessment/Plan:  Chronic calcific pancreatitis   - repeat imaging in March 2024 due to risk of developing pancreatic adenocarcinoma (8-10%)  -Continue 50g low-fat diet per day  - MRI in 1 year in [place] on new machine    10 Minutes of which greater than 50% was spent counseling or coordinating his

## 2025-09-05 ENCOUNTER — OFFICE VISIT (OUTPATIENT)
Dept: FAMILY MEDICINE CLINIC | Age: 75
End: 2025-09-05
Payer: MEDICARE

## 2025-09-05 VITALS
HEIGHT: 71 IN | DIASTOLIC BLOOD PRESSURE: 84 MMHG | OXYGEN SATURATION: 98 % | HEART RATE: 76 BPM | WEIGHT: 192 LBS | BODY MASS INDEX: 26.88 KG/M2 | TEMPERATURE: 98.3 F | SYSTOLIC BLOOD PRESSURE: 128 MMHG

## 2025-09-05 DIAGNOSIS — R73.01 IMPAIRED FASTING GLUCOSE: ICD-10-CM

## 2025-09-05 DIAGNOSIS — R97.20 ELEVATED PSA: ICD-10-CM

## 2025-09-05 DIAGNOSIS — N52.9 ERECTILE DYSFUNCTION, UNSPECIFIED ERECTILE DYSFUNCTION TYPE: ICD-10-CM

## 2025-09-05 DIAGNOSIS — I10 BENIGN ESSENTIAL HYPERTENSION: ICD-10-CM

## 2025-09-05 DIAGNOSIS — K21.9 GERD WITHOUT ESOPHAGITIS: ICD-10-CM

## 2025-09-05 DIAGNOSIS — N32.81 OAB (OVERACTIVE BLADDER): ICD-10-CM

## 2025-09-05 DIAGNOSIS — Z00.00 MEDICARE ANNUAL WELLNESS VISIT, SUBSEQUENT: Primary | ICD-10-CM

## 2025-09-05 DIAGNOSIS — E55.9 VITAMIN D INSUFFICIENCY: ICD-10-CM

## 2025-09-05 PROCEDURE — G0439 PPPS, SUBSEQ VISIT: HCPCS | Performed by: FAMILY MEDICINE

## 2025-09-05 PROCEDURE — 1159F MED LIST DOCD IN RCRD: CPT | Performed by: FAMILY MEDICINE

## 2025-09-05 PROCEDURE — 1123F ACP DISCUSS/DSCN MKR DOCD: CPT | Performed by: FAMILY MEDICINE

## 2025-09-05 PROCEDURE — 3079F DIAST BP 80-89 MM HG: CPT | Performed by: FAMILY MEDICINE

## 2025-09-05 PROCEDURE — 3074F SYST BP LT 130 MM HG: CPT | Performed by: FAMILY MEDICINE

## 2025-09-05 PROCEDURE — 1160F RVW MEDS BY RX/DR IN RCRD: CPT | Performed by: FAMILY MEDICINE

## 2025-09-05 RX ORDER — LISINOPRIL AND HYDROCHLOROTHIAZIDE 12.5; 2 MG/1; MG/1
TABLET ORAL
Qty: 90 TABLET | Refills: 3 | Status: SHIPPED | OUTPATIENT
Start: 2025-09-05

## 2025-09-05 RX ORDER — MIRABEGRON 25 MG/1
25 TABLET, FILM COATED, EXTENDED RELEASE ORAL DAILY
Qty: 30 TABLET | Refills: 5 | Status: SHIPPED | OUTPATIENT
Start: 2025-09-05

## 2025-09-05 RX ORDER — OMEPRAZOLE 40 MG/1
40 CAPSULE, DELAYED RELEASE ORAL
Qty: 90 CAPSULE | Refills: 3 | Status: SHIPPED | OUTPATIENT
Start: 2025-09-05

## 2025-09-05 RX ORDER — AMLODIPINE BESYLATE 5 MG/1
5 TABLET ORAL NIGHTLY
Qty: 90 TABLET | Refills: 3 | Status: SHIPPED | OUTPATIENT
Start: 2025-09-05

## 2025-09-05 RX ORDER — SILDENAFIL 100 MG/1
100 TABLET, FILM COATED ORAL DAILY PRN
Qty: 10 TABLET | Refills: 5 | Status: SHIPPED | OUTPATIENT
Start: 2025-09-05

## 2025-09-05 ASSESSMENT — PATIENT HEALTH QUESTIONNAIRE - PHQ9
1. LITTLE INTEREST OR PLEASURE IN DOING THINGS: NOT AT ALL
SUM OF ALL RESPONSES TO PHQ QUESTIONS 1-9: 0
2. FEELING DOWN, DEPRESSED OR HOPELESS: NOT AT ALL
SUM OF ALL RESPONSES TO PHQ QUESTIONS 1-9: 0

## 2025-09-05 ASSESSMENT — LIFESTYLE VARIABLES
HOW OFTEN DO YOU HAVE A DRINK CONTAINING ALCOHOL: NEVER
HOW MANY STANDARD DRINKS CONTAINING ALCOHOL DO YOU HAVE ON A TYPICAL DAY: PATIENT DOES NOT DRINK

## (undated) DEVICE — PLUMEPORT LAPAROSCOPIC SMOKE FILTRATION DEVICE: Brand: PLUMEPORT ACTIV

## (undated) DEVICE — GOWN,SIRUS,FABRNF,XL,20/CS: Brand: MEDLINE

## (undated) DEVICE — YANKAUER,BULB TIP,W/O VENT,RIGID,STERILE: Brand: MEDLINE

## (undated) DEVICE — TROCAR: Brand: KII FIOS FIRST ENTRY

## (undated) DEVICE — SURGICAL PROCEDURE PACK ROBOTIC

## (undated) DEVICE — ELECTRODE PT RET AD L9FT HI MOIST COND ADH HYDRGEL CORDED

## (undated) DEVICE — [HIGH FLOW INSUFFLATOR,  DO NOT USE IF PACKAGE IS DAMAGED,  KEEP DRY,  KEEP AWAY FROM SUNLIGHT,  PROTECT FROM HEAT AND RADIOACTIVE SOURCES.]: Brand: PNEUMOSURE

## (undated) DEVICE — APPLICATOR MEDICATED 26 CC SOLUTION HI LT ORNG CHLORAPREP

## (undated) DEVICE — BASIC SINGLE BASIN 1-LF: Brand: MEDLINE INDUSTRIES, INC.

## (undated) DEVICE — NEEDLE HYPO 21GA L1.5IN GRN POLYPR HUB S STL REG BVL STR

## (undated) DEVICE — COLUMN DRAPE

## (undated) DEVICE — MEDIUM-LARGE CLIP APPLIER: Brand: ENDOWRIST

## (undated) DEVICE — BLOCK BITE 60FR CAREGUARD

## (undated) DEVICE — LARGE NEEDLE DRIVER: Brand: ENDOWRIST

## (undated) DEVICE — SPONGE GZ 4IN 4IN 4 PLY N WVN AVANT

## (undated) DEVICE — BLADELESS OBTURATOR: Brand: WECK VISTA

## (undated) DEVICE — DOUBLE BASIN SET: Brand: MEDLINE INDUSTRIES, INC.

## (undated) DEVICE — SPONGE GZ W4XL4IN RAYON POLY CVR W/NONWOVEN FAB STRL 2/PK

## (undated) DEVICE — BLADE CLIPPER GEN PURP NS

## (undated) DEVICE — SOLUTION IRRIG 3000ML 0.9% SOD CHL USP UROMATIC PLAS CONT

## (undated) DEVICE — CANNULA SEAL

## (undated) DEVICE — TUBING, SUCTION, 3/16" X 12', STRAIGHT: Brand: MEDLINE

## (undated) DEVICE — SET INST DAVINCI ACCESSORIES

## (undated) DEVICE — FORCEPS BX L240CM JAW DIA2.8MM L CAP W/ NDL MIC MESH TOOTH

## (undated) DEVICE — Device: Brand: DEFENDO VALVE AND CONNECTOR KIT

## (undated) DEVICE — SYRINGE 20ML LL S/C 50

## (undated) DEVICE — FORCEPS BX L240CM JAW DIA2.4MM ORNG L CAP W/ NDL DISP RAD

## (undated) DEVICE — SET INSTRUMENT LAP I

## (undated) DEVICE — TISSUE RETRIEVAL SYSTEM: Brand: INZII RETRIEVAL SYSTEM

## (undated) DEVICE — LARGE HEM-O-LOK CLIP APPLIER: Brand: ENDOWRIST

## (undated) DEVICE — GOWN ISOLATN REG YEL M WT MULTIPLY SIDETIE LEV 2

## (undated) DEVICE — MASK,FACE,MAXFLUIDPROTECT,SHIELD/ERLPS: Brand: MEDLINE

## (undated) DEVICE — TOWEL,OR,DSP,ST,BLUE,STD,6/PK,12PK/CS: Brand: MEDLINE

## (undated) DEVICE — INSUFFLATION NEEDLE TO ESTABLISH PNEUMOPERITONEUM.: Brand: INSUFFLATION NEEDLE

## (undated) DEVICE — SUCTION IRRIGATOR: Brand: ENDOWRIST

## (undated) DEVICE — ADHESIVE SKIN CLOSURE TOP 36 CC HI VISC DERMBND MINI

## (undated) DEVICE — 6 X 9  1.75MIL 4-WALL LABGUARD: Brand: MINIGRIP COMMERCIAL LLC

## (undated) DEVICE — FENESTRATED BIPOLAR FORCEPS: Brand: ENDOWRIST

## (undated) DEVICE — CLIP INT L POLYMER LOK LIG HEM O LOK

## (undated) DEVICE — GLOVE SURG SZ 75 L12IN FNGR THK94MIL TRNSLUC YEL LTX

## (undated) DEVICE — TIBURON GENERAL ENDOSCOPY DRAPE: Brand: CONVERTORS

## (undated) DEVICE — TUBING, SUCTION, 1/4" X 10', STRAIGHT: Brand: MEDLINE

## (undated) DEVICE — ENDOSCOPIC ULTRASOUND FINE NEEDLE BIOPSY (FNB) DEVICE: Brand: ACQUIRE

## (undated) DEVICE — NEEDLE CLOSURE OMNICLOSE

## (undated) DEVICE — FLUORESCENCE IMAGING PROCEDURE KIT: Brand: FIREFLY

## (undated) DEVICE — SCOPE DAVINCI XI 30 DEG W/CORD

## (undated) DEVICE — CLOTH SURG PREP PREOPERATIVE CHLORHEXIDINE GLUC 2% READYPREP

## (undated) DEVICE — KENDALL 450 SERIES MONITORING FOAM ELECTRODE - RECTANGULAR SHAPE ( 3/PK): Brand: KENDALL

## (undated) DEVICE — SET INST DAVINCI LAP

## (undated) DEVICE — LUBRICANT SURG JELLY ST BACTER TUBE 4.25OZ

## (undated) DEVICE — KIT BEDSIDE REVITAL OX 500ML

## (undated) DEVICE — GRADUATE TRIANG MEASURE 1000ML BLK PRNT

## (undated) DEVICE — CLIP INT M L POLYMER LOK LIG HEM O LOK

## (undated) DEVICE — CONTAINER SPEC COLL 960ML POLYPR TRIANG GRAD INTAKE/OUTPUT

## (undated) DEVICE — SCISSORS SURG DIA8MM MPLR CRV ENDOWRIST

## (undated) DEVICE — PROGRASP FORCEPS: Brand: ENDOWRIST

## (undated) DEVICE — ARM DRAPE

## (undated) DEVICE — TIP COVER ACCESSORY